# Patient Record
Sex: MALE | Race: WHITE | NOT HISPANIC OR LATINO | Employment: OTHER | URBAN - METROPOLITAN AREA
[De-identification: names, ages, dates, MRNs, and addresses within clinical notes are randomized per-mention and may not be internally consistent; named-entity substitution may affect disease eponyms.]

---

## 2018-01-10 NOTE — PROGRESS NOTES
Assessment   1  Encounter for preventive health examination (V70 0) (Z00 00)  2  History of Never a smoker  3  Never smoked tobacco (V49 89) (Z78 9)  4  Need for prophylactic vaccination against single diseases (V05 9) (Z23)    Plan  Health Maintenance    · Always use a seat belt and shoulder strap when riding or driving a motor vehicle ;  Status:Complete;   Done: 92LFH0299   · Brush your teeth freq1 and floss at least once a day ; Status:Complete;   Done:  97EJV5439   · Use a sun block product with an SPF of 15 or more ; Status:Complete;   Done:  02YJT3514   · We recommend routine visits to a dentist ; Status:Complete;   Done: 55WEZ6651   · Call [phone] if: You have any warning signs of skin cancer ; Status:Complete;    Done: 05ZSY3175   · Call 911 if: You experience a new kind of chest pain (angina) or pressure ;  Status:Complete;   Done: 61JDS1349  Need for prophylactic vaccination against single diseases    · Temporarily Stop: Fluzone Quadrivalent 0 5 ML Intramuscular Suspension   · Temporarily Stop: Pneumo (Pneumovax)   · Temporarily Stop: Zostavax 45863 UNT/0 65ML Subcutaneous Solution Reconstituted  Need for prophylactic vaccination and inoculation against influenza    · Temporarily Stop: Fluzone Quadrivalent 0 5 ML Intramuscular Suspension  Screening for colon cancer    · COLONOSCOPY ; every 10 years; Permanently Deferred; Status:Permanent Deferral    Discussion/Summary  Impression: health maintenance visit  Currently, he eats a healthy diet, eats a poor diet and has an inadequate exercise regimen  Prostate cancer screening: the patient declines PSA testing  Colorectal cancer screening: the patient declines colorectal cancer screening  The patient declines immunizations  Advice and education were given regarding sunscreen use and seat belt use  Patient discussion: discussed with the patient        Chief Complaint  The patient presents today for annual wellness exam      History of Present Illness  HM, Adult Male: The patient is being seen for a health maintenance evaluation  The last health maintenance visit was 1 year(s) ago  Social History: Household members include spouse and 1 daughter(s)  He is   The patient has never smoked cigarettes  He reports never drinking alcohol  He has never used illicit drugs  General Health: The patient's health since the last visit is described as good  He does not have regular dental visits  He denies vision problems  He has hearing loss  Hearing is significantly decreased   He wears a hearing aid  DO NOT USE  Immunizations status: not up to date  Lifestyle:  He does not have a healthy diet  He does not have any weight concerns  He does not exercise regularly  He does not use tobacco  He denies alcohol use  He denies drug use  Reproductive health:  the patient is not sexually active  Screening: Prostate cancer screening includes no previous evaluation  Colorectal cancer screening includes no previous screening  Safety elements used: seat belt, safe driving habits and smoke detector, but no sunscreen and no carbon monoxide detector  HPI: Cynthia is a 79-year-old gentleman who presents for a wellness visit accompanied by his wife  When asked, he states that his appetite is never-ending  He reports having hearing aids but they are unhelpful even though they have been adjusted  He is not interested in vaccines  When asked, he does have occasional palpitations and states that his cardiologist is aware of them  BK       Review of Systems    Cardiovascular: palpitations, but as noted in HPI, the heart rate was not slow, no chest pain, no intermittent leg claudication, the heart rate was not fast and no extremity edema  Respiratory: no shortness of breath, no cough, no orthopnea, no wheezing, no shortness of breath during exertion and no PND     Gastrointestinal: No complaints of abdominal pain, no constipation, no nausea or vomiting, no diarrhea or bloody stools  Genitourinary: No complaints of dysuria, no incontinence, no hesitancy, no nocturia, no genital lesion, no testicular pain  ROS reviewed  Active Problems   1  Acute upper respiratory infection (465 9) (J06 9)  2  ASCVD (arteriosclerotic cardiovascular disease) (429 2,440 9) (I25 10)  3  Cough (786 2) (R05)  4  Degeneration of lumbar intervertebral disc (722 52) (M51 36)  5  Exposed To Contagious Viral Disease (Influenza) (V01 79)  6  Hand deformity (736 00) (M21 949)  7  Herniated lumbar intervertebral disc (722 10) (M51 26)  8  Lower back pain (724 2) (M54 5)  9  Lumbar radiculopathy (724 4) (M54 16)  10  Lumbosacral spinal stenosis (724 02) (M48 07)  11  Screening for colon cancer (V76 51) (Z12 11)    Past Medical History    · History of Benign essential hypertension (401 1) (I10)   · History of Chest pain (786 50) (R07 9)   · History of angina pectoris (V12 59) (Z86 79)   · History of common cold (V12 09) (Z86 19)   · History of Leukemia (V10 60)   · History of Muscle Aches   · History of Nephrolithiasis (V13 01)    Surgical History    · History of Appendectomy   · History of Cath Stent Placement   · History of Hand Surgery   · History of Inguinal Hernia Repair   · History of Umbilical Hernia Repair    Family History  Mother    · Family history of Diabetes  Father    · Family history of Kidney disease  Family History    · Family history of Diabetes   · Family history of Hypertension, benign   · Family history of Kidney disease    Social History    · Denied: Exercising Regularly   · Never smoked tobacco (V49 89) (Z78 9)   · No alcohol use   · Previous  service, honorably discharged1      1 Amended By: Mac Gonzalez; Jun 26 2016 9:59 AM EST    Current Meds  1  Aspirin 325 MG Oral Tablet; Take 1 tablet daily Recorded  2  Lisinopril 5 MG Oral Tablet; TAKE 1 TABLET DAILY; Therapy: (Recorded:27Jan2013) to Recorded  3   Metoprolol Succinate 100 MG TBCR; Takes 1/2 tablet twice daily; Therapy: (OLQQGFYW:19ZQV9534) to Recorded  4  Plavix 75 MG Oral Tablet; TAKE 1 TABLET DAILY; Therapy: (ALTJTMEJ:12LEV2375) to Recorded  5  Vytorin 10-40 MG Oral Tablet; Take 1 tablet daily; Therapy: (Recorded:27Jan2013) to Recorded    Allergies   1  Dilantin CAPS    Vitals   Recorded: 50IZD5098 04:23PM   Temperature 98 5 F, Oral   Heart Rate 65   Respiration 16   Systolic 954, LUE, Sitting   Diastolic 84, LUE, Sitting   Height 5 ft 10 5 in   Weight 222 lb 0 8 oz   BMI Calculated 31 41   BSA Calculated 2 19     Physical Exam    Constitutional   General appearance: No acute distress, well appearing and well nourished  well developed, appears healthy, comfortable, well nourished, clothing appropriate, rested, not exhausted, well hydrated and appearance reflects stated age  Eyes   Conjunctiva and lids: No erythema, swelling or discharge  Ears, Nose, Mouth, and Throat   Hearing: Abnormal     Pulmonary   Respiratory effort: No increased work of breathing or signs of respiratory distress  Respiratory rate: normal  Assessment of respiratory effort revealed normal rhythm and effort  Auscultation of lungs: Clear to auscultation  no rales or crackles were heard bilaterally  no rhonchi  no friction rub  no wheezing  no diminished breath sounds  no bronchial breath sounds  Cardiovascular   Auscultation of heart: Normal rate and rhythm, normal S1 and S2, no murmurs  The heart rate was normal  The rhythm was regular  no murmurs were heard     Psychiatric   Judgment and insight: Normal     Mood and affect: Normal        Results/Data  Falls Risk Assessment (Dx V80 09 Screen for Neurologic Disorder) [id number] 04:27PM User, [employer]     Test Name Result Flag Reference   Falls Risk      No falls in the past year     PHQ-2 Adult Depression Screening 23Jun2016 04:26PM User, [employer]     Test Name Result Flag Reference   PHQ-2 Adult Depression Score 0     Over the last two weeks, how often have you been bothered by any of the following problems? Little interest or pleasure in doing things: Not at all - 0  Feeling down, depressed, or hopeless: Not at all - 0   PHQ-2 Adult Depression Screening Negative         Provider Comments  #1  Health maintenance-a history and a physical exam were performed for him during his office visit  His allergy history, medication history, and social history were reviewed with him and updated, as needed  He will continue with annual wellness visits  #2  Vaccine care-he gives informed refusal to receive the seasonal influenza vaccine, Pneumovax 23 vaccine with his ASCVD history, and shingles vaccine  #3  Colon cancer screening-he gives informed refusal for preventative care  #4  Prostate cancer screening-he gives informed refusal for preventative care  #5  ASCVD status post stent placement-he is asymptomatic and following with his cardiologist for secondary preventative care  #6  Kidney stone history-he is following with his urologist as needed  #7  Hearing difficulty-he reports that his hearing aids were unhelpful  #8  Left knee pain-he reports that a total knee replacement has been recommended, but he is waiting until he is covered by Medicare by report  #9  Lumbar canal stenosis status post surgical treatment-he reports that he is doing well  #10  I advised him to follow-up in one year for a wellness visit which will be his welcome to Medicare office visit or sooner, if needed  Health Management  Screening for colon cancer   COLONOSCOPY; every 10 years; Next Permanently Deferred; Future Appointments    Date/Time Provider Specialty Site   06/23/2017 09:00 AM Kvng Fernandez MD [facility]   Electronically signed by :  Александр Rodriguez MD; Jun 26 2016  9:59AM EST                       (Author)

## 2023-02-27 ENCOUNTER — ESTABLISHED COMPREHENSIVE EXAM (OUTPATIENT)
Dept: URBAN - METROPOLITAN AREA CLINIC 6 | Facility: CLINIC | Age: 73
End: 2023-02-27

## 2023-02-27 DIAGNOSIS — H47.20: ICD-10-CM

## 2023-02-27 DIAGNOSIS — H04.123: ICD-10-CM

## 2023-02-27 DIAGNOSIS — Z96.1: ICD-10-CM

## 2023-02-27 DIAGNOSIS — E11.9: ICD-10-CM

## 2023-02-27 PROCEDURE — 92014 COMPRE OPH EXAM EST PT 1/>: CPT

## 2023-02-27 ASSESSMENT — VISUAL ACUITY
OS_PH: 20/30
OD_SC: 20/50
OS_SC: 20/70
OD_PH: 20/30+2

## 2023-02-27 ASSESSMENT — TONOMETRY
OS_IOP_MMHG: 13
OD_IOP_MMHG: 13

## 2023-05-03 ENCOUNTER — APPOINTMENT (INPATIENT)
Dept: CT IMAGING | Facility: HOSPITAL | Age: 73
End: 2023-05-03

## 2023-05-03 ENCOUNTER — ANESTHESIA EVENT (INPATIENT)
Dept: RADIOLOGY | Facility: HOSPITAL | Age: 73
End: 2023-05-03

## 2023-05-03 ENCOUNTER — APPOINTMENT (EMERGENCY)
Dept: RADIOLOGY | Facility: HOSPITAL | Age: 73
End: 2023-05-03

## 2023-05-03 ENCOUNTER — HOSPITAL ENCOUNTER (INPATIENT)
Facility: HOSPITAL | Age: 73
LOS: 12 days | Discharge: HOME | End: 2023-05-15
Attending: EMERGENCY MEDICINE | Admitting: SURGERY

## 2023-05-03 ENCOUNTER — APPOINTMENT (INPATIENT)
Dept: RADIOLOGY | Facility: HOSPITAL | Age: 73
End: 2023-05-03

## 2023-05-03 ENCOUNTER — APPOINTMENT (INPATIENT)
Dept: RADIOLOGY | Facility: HOSPITAL | Age: 73
End: 2023-05-03
Attending: INTERNAL MEDICINE

## 2023-05-03 ENCOUNTER — ANESTHESIA (OUTPATIENT)
Dept: ANESTHESIOLOGY | Facility: HOSPITAL | Age: 73
End: 2023-05-03

## 2023-05-03 ENCOUNTER — APPOINTMENT (EMERGENCY)
Dept: CT IMAGING | Facility: HOSPITAL | Age: 73
End: 2023-05-03

## 2023-05-03 ENCOUNTER — APPOINTMENT (INPATIENT)
Dept: RADIOLOGY | Facility: HOSPITAL | Age: 73
End: 2023-05-03
Attending: RADIOLOGY

## 2023-05-03 ENCOUNTER — ANESTHESIA EVENT (OUTPATIENT)
Dept: ANESTHESIOLOGY | Facility: HOSPITAL | Age: 73
End: 2023-05-03

## 2023-05-03 ENCOUNTER — ANESTHESIA (INPATIENT)
Dept: RADIOLOGY | Facility: HOSPITAL | Age: 73
End: 2023-05-03

## 2023-05-03 ENCOUNTER — APPOINTMENT (INPATIENT)
Dept: NON INVASIVE DIAGNOSTICS | Facility: HOSPITAL | Age: 73
End: 2023-05-03

## 2023-05-03 DIAGNOSIS — N28.89 RENAL HEMORRHAGE, LEFT: Primary | ICD-10-CM

## 2023-05-03 DIAGNOSIS — R57.8 HEMORRHAGIC SHOCK (HCC): ICD-10-CM

## 2023-05-03 DIAGNOSIS — R06.89 ACUTE RESPIRATORY INSUFFICIENCY: ICD-10-CM

## 2023-05-03 DIAGNOSIS — E80.6 HYPERBILIRUBINEMIA: ICD-10-CM

## 2023-05-03 DIAGNOSIS — I25.10 CAD (CORONARY ARTERY DISEASE): ICD-10-CM

## 2023-05-03 DIAGNOSIS — S37.062A: ICD-10-CM

## 2023-05-03 DIAGNOSIS — G40.909 SEIZURE DISORDER (HCC): ICD-10-CM

## 2023-05-03 PROBLEM — E11.9 TYPE 2 DIABETES MELLITUS (HCC): Status: ACTIVE | Noted: 2023-05-03

## 2023-05-03 PROBLEM — I48.0 PAROXYSMAL ATRIAL FIBRILLATION (HCC): Status: ACTIVE | Noted: 2023-05-03

## 2023-05-03 PROBLEM — C95.90 LEUKEMIA (HCC): Status: ACTIVE | Noted: 2023-05-03

## 2023-05-03 PROBLEM — D73.5 SPLENIC HEMORRHAGE: Status: ACTIVE | Noted: 2023-05-03

## 2023-05-03 LAB
2HR DELTA HS TROPONIN: 566 NG/L
ABO GROUP BLD BPU: NORMAL
ABO GROUP BLD BPU: NORMAL
ABO GROUP BLD: NORMAL
ALBUMIN SERPL BCP-MCNC: 3.1 G/DL (ref 3.5–5)
ALBUMIN SERPL BCP-MCNC: 3.1 G/DL (ref 3.5–5)
ALBUMIN SERPL BCP-MCNC: 3.3 G/DL (ref 3.5–5)
ALBUMIN SERPL BCP-MCNC: 3.9 G/DL (ref 3.5–5)
ALP SERPL-CCNC: 32 U/L (ref 34–104)
ALP SERPL-CCNC: 32 U/L (ref 34–104)
ALP SERPL-CCNC: 39 U/L (ref 34–104)
ALP SERPL-CCNC: 53 U/L (ref 34–104)
ALT SERPL W P-5'-P-CCNC: 10 U/L (ref 7–52)
ALT SERPL W P-5'-P-CCNC: 14 U/L (ref 7–52)
ALT SERPL W P-5'-P-CCNC: 16 U/L (ref 7–52)
ALT SERPL W P-5'-P-CCNC: 9 U/L (ref 7–52)
ANION GAP SERPL CALCULATED.3IONS-SCNC: 10 MMOL/L (ref 4–13)
ANION GAP SERPL CALCULATED.3IONS-SCNC: 19 MMOL/L (ref 4–13)
ANION GAP SERPL CALCULATED.3IONS-SCNC: 8 MMOL/L (ref 4–13)
APTT PPP: 41 SECONDS (ref 23–37)
AST SERPL W P-5'-P-CCNC: 13 U/L (ref 13–39)
AST SERPL W P-5'-P-CCNC: 14 U/L (ref 13–39)
AST SERPL W P-5'-P-CCNC: 15 U/L (ref 13–39)
AST SERPL W P-5'-P-CCNC: 31 U/L (ref 13–39)
ATRIAL RATE: 134 BPM
ATRIAL RATE: 61 BPM
BASE EXCESS BLDA CALC-SCNC: -13 MMOL/L (ref -2–3)
BASE EXCESS BLDA CALC-SCNC: -7.5 MMOL/L
BASOPHILS # BLD AUTO: 0.05 THOUSANDS/ÂΜL (ref 0–0.1)
BASOPHILS # BLD AUTO: 0.07 THOUSANDS/ÂΜL (ref 0–0.1)
BASOPHILS # BLD AUTO: 0.09 THOUSANDS/ÂΜL (ref 0–0.1)
BASOPHILS # BLD AUTO: 0.09 THOUSANDS/ÂΜL (ref 0–0.1)
BASOPHILS # BLD MANUAL: 0 THOUSAND/UL (ref 0–0.1)
BASOPHILS NFR BLD AUTO: 0 % (ref 0–1)
BASOPHILS NFR BLD AUTO: 1 % (ref 0–1)
BASOPHILS NFR MAR MANUAL: 0 % (ref 0–1)
BILIRUB SERPL-MCNC: 0.5 MG/DL (ref 0.2–1)
BILIRUB SERPL-MCNC: 0.63 MG/DL (ref 0.2–1)
BILIRUB SERPL-MCNC: 0.74 MG/DL (ref 0.2–1)
BILIRUB SERPL-MCNC: 0.84 MG/DL (ref 0.2–1)
BLD GP AB SCN SERPL QL: NEGATIVE
BPU ID: NORMAL
BPU ID: NORMAL
BUN SERPL-MCNC: 16 MG/DL (ref 5–25)
BUN SERPL-MCNC: 18 MG/DL (ref 5–25)
BUN SERPL-MCNC: 19 MG/DL (ref 5–25)
BUN SERPL-MCNC: 26 MG/DL (ref 5–25)
BUN SERPL-MCNC: 26 MG/DL (ref 5–25)
CA-I BLD-SCNC: 1.06 MMOL/L (ref 1.12–1.32)
CA-I BLD-SCNC: 1.12 MMOL/L (ref 1.12–1.32)
CALCIUM ALBUM COR SERPL-MCNC: 7.9 MG/DL (ref 8.3–10.1)
CALCIUM ALBUM COR SERPL-MCNC: 7.9 MG/DL (ref 8.3–10.1)
CALCIUM ALBUM COR SERPL-MCNC: 8 MG/DL (ref 8.3–10.1)
CALCIUM SERPL-MCNC: 7.2 MG/DL (ref 8.4–10.2)
CALCIUM SERPL-MCNC: 7.2 MG/DL (ref 8.4–10.2)
CALCIUM SERPL-MCNC: 7.4 MG/DL (ref 8.4–10.2)
CALCIUM SERPL-MCNC: 8.2 MG/DL (ref 8.4–10.2)
CALCIUM SERPL-MCNC: 8.5 MG/DL (ref 8.4–10.2)
CARDIAC TROPONIN I PNL SERPL HS: 1150 NG/L
CARDIAC TROPONIN I PNL SERPL HS: 187 NG/L
CARDIAC TROPONIN I PNL SERPL HS: 753 NG/L
CFFMA (FUNCTIONAL FIBRINOGEN MAX AMPLITUDE): 12.7 MM (ref 15–32)
CFFMA (FUNCTIONAL FIBRINOGEN MAX AMPLITUDE): 16.6 MM (ref 15–32)
CHLORIDE SERPL-SCNC: 103 MMOL/L (ref 96–108)
CHLORIDE SERPL-SCNC: 106 MMOL/L (ref 96–108)
CHLORIDE SERPL-SCNC: 109 MMOL/L (ref 96–108)
CKLY30: 0 % (ref 0–2.6)
CKLY30: 0 % (ref 0–2.6)
CKR(REACTION TIME): 8.4 MIN (ref 4.6–9.1)
CKR(REACTION TIME): 8.5 MIN (ref 4.6–9.1)
CO2 SERPL-SCNC: 14 MMOL/L (ref 21–32)
CO2 SERPL-SCNC: 18 MMOL/L (ref 21–32)
CO2 SERPL-SCNC: 21 MMOL/L (ref 21–32)
CO2 SERPL-SCNC: 24 MMOL/L (ref 21–32)
CO2 SERPL-SCNC: 28 MMOL/L (ref 21–32)
CREAT SERPL-MCNC: 1.18 MG/DL (ref 0.6–1.3)
CREAT SERPL-MCNC: 1.31 MG/DL (ref 0.6–1.3)
CREAT SERPL-MCNC: 1.41 MG/DL (ref 0.6–1.3)
CREAT SERPL-MCNC: 2.28 MG/DL (ref 0.6–1.3)
CREAT SERPL-MCNC: 2.41 MG/DL (ref 0.6–1.3)
CROSSMATCH: NORMAL
CROSSMATCH: NORMAL
CRTMA(RAPIDTEG MAX AMPLITUDE): 56 MM (ref 52–70)
CRTMA(RAPIDTEG MAX AMPLITUDE): 58 MM (ref 52–70)
DS:DELIVERY SYSTEM: ABNORMAL
EOSINOPHIL # BLD AUTO: 0.03 THOUSAND/ÂΜL (ref 0–0.61)
EOSINOPHIL # BLD AUTO: 0.05 THOUSAND/ÂΜL (ref 0–0.61)
EOSINOPHIL # BLD AUTO: 0.36 THOUSAND/ÂΜL (ref 0–0.61)
EOSINOPHIL # BLD AUTO: 0.62 THOUSAND/ÂΜL (ref 0–0.61)
EOSINOPHIL # BLD MANUAL: 0 THOUSAND/UL (ref 0–0.4)
EOSINOPHIL NFR BLD AUTO: 0 % (ref 0–6)
EOSINOPHIL NFR BLD AUTO: 0 % (ref 0–6)
EOSINOPHIL NFR BLD AUTO: 2 % (ref 0–6)
EOSINOPHIL NFR BLD AUTO: 3 % (ref 0–6)
EOSINOPHIL NFR BLD MANUAL: 0 % (ref 0–6)
ERYTHROCYTE [DISTWIDTH] IN BLOOD BY AUTOMATED COUNT: 11.9 % (ref 11.6–15.1)
ERYTHROCYTE [DISTWIDTH] IN BLOOD BY AUTOMATED COUNT: 13 % (ref 11.6–15.1)
ERYTHROCYTE [DISTWIDTH] IN BLOOD BY AUTOMATED COUNT: 13.2 % (ref 11.6–15.1)
ERYTHROCYTE [DISTWIDTH] IN BLOOD BY AUTOMATED COUNT: 13.3 % (ref 11.6–15.1)
ERYTHROCYTE [DISTWIDTH] IN BLOOD BY AUTOMATED COUNT: 14.7 % (ref 11.6–15.1)
GFR SERPL CREATININE-BSD FRML MDRD: 26 ML/MIN/1.73SQ M
GFR SERPL CREATININE-BSD FRML MDRD: 27 ML/MIN/1.73SQ M
GFR SERPL CREATININE-BSD FRML MDRD: 49 ML/MIN/1.73SQ M
GFR SERPL CREATININE-BSD FRML MDRD: 54 ML/MIN/1.73SQ M
GFR SERPL CREATININE-BSD FRML MDRD: 61 ML/MIN/1.73SQ M
GLUCOSE SERPL-MCNC: 174 MG/DL (ref 65–140)
GLUCOSE SERPL-MCNC: 196 MG/DL (ref 65–140)
GLUCOSE SERPL-MCNC: 200 MG/DL (ref 65–140)
GLUCOSE SERPL-MCNC: 256 MG/DL (ref 65–140)
GLUCOSE SERPL-MCNC: 265 MG/DL (ref 65–140)
GLUCOSE SERPL-MCNC: 276 MG/DL (ref 65–140)
GLUCOSE SERPL-MCNC: 279 MG/DL (ref 65–140)
GLUCOSE SERPL-MCNC: 283 MG/DL (ref 65–140)
GLUCOSE SERPL-MCNC: 285 MG/DL (ref 65–140)
GLUCOSE SERPL-MCNC: 285 MG/DL (ref 65–140)
GLUCOSE SERPL-MCNC: 298 MG/DL (ref 65–140)
HCO3 BLDA-SCNC: 10.7 MMOL/L (ref 22–28)
HCO3 BLDA-SCNC: 18.4 MMOL/L (ref 22–28)
HCT VFR BLD AUTO: 30.4 % (ref 36.5–49.3)
HCT VFR BLD AUTO: 31.6 % (ref 36.5–49.3)
HCT VFR BLD AUTO: 32.1 % (ref 36.5–49.3)
HCT VFR BLD AUTO: 32.1 % (ref 36.5–49.3)
HCT VFR BLD AUTO: 32.5 % (ref 36.5–49.3)
HCT VFR BLD AUTO: 44.5 % (ref 36.5–49.3)
HCT VFR BLD CALC: 27 % (ref 36.5–49.3)
HGB BLD-MCNC: 10.3 G/DL (ref 12–17)
HGB BLD-MCNC: 10.5 G/DL (ref 12–17)
HGB BLD-MCNC: 10.8 G/DL (ref 12–17)
HGB BLD-MCNC: 10.9 G/DL (ref 12–17)
HGB BLD-MCNC: 11 G/DL (ref 12–17)
HGB BLD-MCNC: 12.1 G/DL (ref 12–17)
HGB BLD-MCNC: 15.2 G/DL (ref 12–17)
HGB BLDA-MCNC: 9.2 G/DL (ref 12–17)
IMM GRANULOCYTES # BLD AUTO: 0.06 THOUSAND/UL (ref 0–0.2)
IMM GRANULOCYTES # BLD AUTO: 0.26 THOUSAND/UL (ref 0–0.2)
IMM GRANULOCYTES # BLD AUTO: 0.44 THOUSAND/UL (ref 0–0.2)
IMM GRANULOCYTES # BLD AUTO: >0.5 THOUSAND/UL (ref 0–0.2)
IMM GRANULOCYTES NFR BLD AUTO: 0 % (ref 0–2)
IMM GRANULOCYTES NFR BLD AUTO: 1 % (ref 0–2)
IMM GRANULOCYTES NFR BLD AUTO: 2 % (ref 0–2)
IMM GRANULOCYTES NFR BLD AUTO: 2 % (ref 0–2)
INR PPP: 1.81 (ref 0.84–1.19)
INR PPP: 2.12 (ref 0.84–1.19)
INR PPP: 2.99 (ref 0.84–1.19)
INR PPP: 4.33 (ref 0.84–1.19)
LACTATE SERPL-SCNC: 10.1 MMOL/L (ref 0.5–2)
LACTATE SERPL-SCNC: 3.1 MMOL/L (ref 0.5–2)
LACTATE SERPL-SCNC: 4 MMOL/L (ref 0.5–2)
LIPASE SERPL-CCNC: 23 U/L (ref 11–82)
LYMPHOCYTES # BLD AUTO: 0 % (ref 14–44)
LYMPHOCYTES # BLD AUTO: 0 THOUSAND/UL (ref 0.6–4.47)
LYMPHOCYTES # BLD AUTO: 0.66 THOUSANDS/ÂΜL (ref 0.6–4.47)
LYMPHOCYTES # BLD AUTO: 0.97 THOUSANDS/ÂΜL (ref 0.6–4.47)
LYMPHOCYTES # BLD AUTO: 1.09 THOUSANDS/ÂΜL (ref 0.6–4.47)
LYMPHOCYTES # BLD AUTO: 2.75 THOUSANDS/ÂΜL (ref 0.6–4.47)
LYMPHOCYTES NFR BLD AUTO: 18 % (ref 14–44)
LYMPHOCYTES NFR BLD AUTO: 3 % (ref 14–44)
MAGNESIUM SERPL-MCNC: 1.7 MG/DL (ref 1.9–2.7)
MAGNESIUM SERPL-MCNC: 1.8 MG/DL (ref 1.9–2.7)
MAGNESIUM SERPL-MCNC: 1.9 MG/DL (ref 1.9–2.7)
MCH RBC QN AUTO: 31.2 PG (ref 26.8–34.3)
MCH RBC QN AUTO: 32.4 PG (ref 26.8–34.3)
MCH RBC QN AUTO: 32.9 PG (ref 26.8–34.3)
MCH RBC QN AUTO: 33.1 PG (ref 26.8–34.3)
MCH RBC QN AUTO: 33.4 PG (ref 26.8–34.3)
MCHC RBC AUTO-ENTMCNC: 33.6 G/DL (ref 31.4–37.4)
MCHC RBC AUTO-ENTMCNC: 33.8 G/DL (ref 31.4–37.4)
MCHC RBC AUTO-ENTMCNC: 33.9 G/DL (ref 31.4–37.4)
MCHC RBC AUTO-ENTMCNC: 34 G/DL (ref 31.4–37.4)
MCHC RBC AUTO-ENTMCNC: 34.2 G/DL (ref 31.4–37.4)
MCV RBC AUTO: 92 FL (ref 82–98)
MCV RBC AUTO: 96 FL (ref 82–98)
MCV RBC AUTO: 98 FL (ref 82–98)
MONOCYTES # BLD AUTO: 0.78 THOUSAND/ÂΜL (ref 0.17–1.22)
MONOCYTES # BLD AUTO: 1.7 THOUSAND/ÂΜL (ref 0.17–1.22)
MONOCYTES # BLD AUTO: 1.89 THOUSAND/ÂΜL (ref 0.17–1.22)
MONOCYTES # BLD AUTO: 2.06 THOUSAND/UL (ref 0–1.22)
MONOCYTES # BLD AUTO: 2.13 THOUSAND/ÂΜL (ref 0.17–1.22)
MONOCYTES NFR BLD AUTO: 5 % (ref 4–12)
MONOCYTES NFR BLD AUTO: 6 % (ref 4–12)
MONOCYTES NFR BLD AUTO: 6 % (ref 4–12)
MONOCYTES NFR BLD AUTO: 8 % (ref 4–12)
MONOCYTES NFR BLD: 6 % (ref 4–12)
NEUTROPHILS # BLD AUTO: 11.65 THOUSANDS/ÂΜL (ref 1.85–7.62)
NEUTROPHILS # BLD AUTO: 19.15 THOUSANDS/ÂΜL (ref 1.85–7.62)
NEUTROPHILS # BLD AUTO: 26.12 THOUSANDS/ÂΜL (ref 1.85–7.62)
NEUTROPHILS # BLD AUTO: 29.82 THOUSANDS/ÂΜL (ref 1.85–7.62)
NEUTROPHILS # BLD MANUAL: 32.26 THOUSAND/UL (ref 1.85–7.62)
NEUTS BAND NFR BLD MANUAL: 2 % (ref 0–8)
NEUTS SEG NFR BLD AUTO: 74 % (ref 43–75)
NEUTS SEG NFR BLD AUTO: 85 % (ref 43–75)
NEUTS SEG NFR BLD AUTO: 89 % (ref 43–75)
NEUTS SEG NFR BLD AUTO: 89 % (ref 43–75)
NEUTS SEG NFR BLD AUTO: 92 % (ref 43–75)
NRBC BLD AUTO-RTO: 0 /100 WBCS
O2 CT BLDA-SCNC: 16.4 ML/DL (ref 16–23)
OXYHGB MFR BLDA: 96.4 % (ref 94–97)
P AXIS: 43 DEGREES
P AXIS: 53 DEGREES
PCO2 BLD: 11 MMOL/L (ref 21–32)
PCO2 BLD: 19.3 MM HG (ref 36–44)
PCO2 BLDA: 39 MM HG (ref 36–44)
PH BLD: 7.35 [PH] (ref 7.35–7.45)
PH BLDA: 7.29 [PH] (ref 7.35–7.45)
PHOSPHATE SERPL-MCNC: 4.2 MG/DL (ref 2.3–4.1)
PHOSPHATE SERPL-MCNC: 4.5 MG/DL (ref 2.3–4.1)
PHOSPHATE SERPL-MCNC: 5.3 MG/DL (ref 2.3–4.1)
PLATELET # BLD AUTO: 130 THOUSANDS/UL (ref 149–390)
PLATELET # BLD AUTO: 205 THOUSANDS/UL (ref 149–390)
PLATELET # BLD AUTO: 224 THOUSANDS/UL (ref 149–390)
PLATELET # BLD AUTO: 246 THOUSANDS/UL (ref 149–390)
PLATELET # BLD AUTO: 281 THOUSANDS/UL (ref 149–390)
PLATELET BLD QL SMEAR: ADEQUATE
PMV BLD AUTO: 10.2 FL (ref 8.9–12.7)
PMV BLD AUTO: 10.2 FL (ref 8.9–12.7)
PMV BLD AUTO: 10.6 FL (ref 8.9–12.7)
PMV BLD AUTO: 10.6 FL (ref 8.9–12.7)
PMV BLD AUTO: 10.8 FL (ref 8.9–12.7)
PO2 BLD: 116 MM HG (ref 75–129)
PO2 BLDA: 92.6 MM HG (ref 75–129)
POTASSIUM BLD-SCNC: 5.3 MMOL/L (ref 3.5–5.3)
POTASSIUM SERPL-SCNC: 3.1 MMOL/L (ref 3.5–5.3)
POTASSIUM SERPL-SCNC: 3.8 MMOL/L (ref 3.5–5.3)
POTASSIUM SERPL-SCNC: 4 MMOL/L (ref 3.5–5.3)
POTASSIUM SERPL-SCNC: 5.2 MMOL/L (ref 3.5–5.3)
POTASSIUM SERPL-SCNC: 5.3 MMOL/L (ref 3.5–5.3)
PR INTERVAL: 122 MS
PR INTERVAL: 164 MS
PROT SERPL-MCNC: 4.5 G/DL (ref 6.4–8.4)
PROT SERPL-MCNC: 4.5 G/DL (ref 6.4–8.4)
PROT SERPL-MCNC: 4.9 G/DL (ref 6.4–8.4)
PROT SERPL-MCNC: 6.1 G/DL (ref 6.4–8.4)
PROTHROMBIN TIME: 21.3 SECONDS (ref 11.6–14.5)
PROTHROMBIN TIME: 24 SECONDS (ref 11.6–14.5)
PROTHROMBIN TIME: 31.3 SECONDS (ref 11.6–14.5)
PROTHROMBIN TIME: 41.7 SECONDS (ref 11.6–14.5)
QRS AXIS: 79 DEGREES
QRS AXIS: 87 DEGREES
QRSD INTERVAL: 108 MS
QRSD INTERVAL: 98 MS
QT INTERVAL: 302 MS
QT INTERVAL: 468 MS
QTC INTERVAL: 450 MS
QTC INTERVAL: 471 MS
RBC # BLD AUTO: 3.18 MILLION/UL (ref 3.88–5.62)
RBC # BLD AUTO: 3.28 MILLION/UL (ref 3.88–5.62)
RBC # BLD AUTO: 3.29 MILLION/UL (ref 3.88–5.62)
RBC # BLD AUTO: 3.53 MILLION/UL (ref 3.88–5.62)
RBC # BLD AUTO: 4.55 MILLION/UL (ref 3.88–5.62)
RBC MORPH BLD: NORMAL
RESPIRATORY RATE: 6
RH BLD: POSITIVE
SAO2 % BLD FROM PO2: 98 % (ref 60–85)
SODIUM BLD-SCNC: 137 MMOL/L (ref 136–145)
SODIUM SERPL-SCNC: 137 MMOL/L (ref 135–147)
SODIUM SERPL-SCNC: 139 MMOL/L (ref 135–147)
SODIUM SERPL-SCNC: 140 MMOL/L (ref 135–147)
SODIUM SERPL-SCNC: 141 MMOL/L (ref 135–147)
SODIUM SERPL-SCNC: 141 MMOL/L (ref 135–147)
SPECIMEN EXPIRATION DATE: NORMAL
SPECIMEN SOURCE: ABNORMAL
SPECIMEN SOURCE: ABNORMAL
T WAVE AXIS: -24 DEGREES
T WAVE AXIS: 81 DEGREES
UNIT DISPENSE STATUS: NORMAL
UNIT DISPENSE STATUS: NORMAL
UNIT PRODUCT CODE: NORMAL
UNIT PRODUCT CODE: NORMAL
UNIT PRODUCT VOLUME: 350 ML
UNIT PRODUCT VOLUME: 350 ML
UNIT RH: NORMAL
UNIT RH: NORMAL
VENTRICULAR RATE: 134 BPM
VENTRICULAR RATE: 61 BPM
WBC # BLD AUTO: 15.69 THOUSAND/UL (ref 4.31–10.16)
WBC # BLD AUTO: 22.63 THOUSAND/UL (ref 4.31–10.16)
WBC # BLD AUTO: 29.35 THOUSAND/UL (ref 4.31–10.16)
WBC # BLD AUTO: 34.02 THOUSAND/UL (ref 4.31–10.16)
WBC # BLD AUTO: 34.32 THOUSAND/UL (ref 4.31–10.16)

## 2023-05-03 PROCEDURE — 04LA3DZ OCCLUSION OF LEFT RENAL ARTERY WITH INTRALUMINAL DEVICE, PERCUTANEOUS APPROACH: ICD-10-PCS | Performed by: INTERNAL MEDICINE

## 2023-05-03 PROCEDURE — B4171ZZ FLUOROSCOPY OF LEFT RENAL ARTERY USING LOW OSMOLAR CONTRAST: ICD-10-PCS | Performed by: RADIOLOGY

## 2023-05-03 PROCEDURE — B4171ZZ FLUOROSCOPY OF LEFT RENAL ARTERY USING LOW OSMOLAR CONTRAST: ICD-10-PCS | Performed by: INTERNAL MEDICINE

## 2023-05-03 PROCEDURE — 04LA3DZ OCCLUSION OF LEFT RENAL ARTERY WITH INTRALUMINAL DEVICE, PERCUTANEOUS APPROACH: ICD-10-PCS | Performed by: RADIOLOGY

## 2023-05-03 PROCEDURE — B4191ZZ FLUOROSCOPY OF LUMBAR ARTERIES USING LOW OSMOLAR CONTRAST: ICD-10-PCS | Performed by: INTERNAL MEDICINE

## 2023-05-03 PROCEDURE — 30233K1 TRANSFUSION OF NONAUTOLOGOUS FROZEN PLASMA INTO PERIPHERAL VEIN, PERCUTANEOUS APPROACH: ICD-10-PCS | Performed by: SURGERY

## 2023-05-03 PROCEDURE — 5A1935Z RESPIRATORY VENTILATION, LESS THAN 24 CONSECUTIVE HOURS: ICD-10-PCS | Performed by: INTERNAL MEDICINE

## 2023-05-03 PROCEDURE — B41F1ZZ FLUOROSCOPY OF RIGHT LOWER EXTREMITY ARTERIES USING LOW OSMOLAR CONTRAST: ICD-10-PCS | Performed by: RADIOLOGY

## 2023-05-03 PROCEDURE — 30233N1 TRANSFUSION OF NONAUTOLOGOUS RED BLOOD CELLS INTO PERIPHERAL VEIN, PERCUTANEOUS APPROACH: ICD-10-PCS | Performed by: SURGERY

## 2023-05-03 RX ORDER — ACETAMINOPHEN 325 MG/1
975 TABLET ORAL EVERY 8 HOURS SCHEDULED
Status: DISCONTINUED | OUTPATIENT
Start: 2023-05-03 | End: 2023-05-08

## 2023-05-03 RX ORDER — HYDROMORPHONE HCL/PF 1 MG/ML
1 SYRINGE (ML) INJECTION ONCE
Status: DISCONTINUED | OUTPATIENT
Start: 2023-05-03 | End: 2023-05-03

## 2023-05-03 RX ORDER — ONDANSETRON 2 MG/ML
4 INJECTION INTRAMUSCULAR; INTRAVENOUS EVERY 6 HOURS PRN
Status: DISCONTINUED | OUTPATIENT
Start: 2023-05-03 | End: 2023-05-15 | Stop reason: HOSPADM

## 2023-05-03 RX ORDER — LACOSAMIDE 100 MG/1
100 TABLET ORAL EVERY 12 HOURS SCHEDULED
COMMUNITY

## 2023-05-03 RX ORDER — CEFAZOLIN SODIUM 1 G/3ML
INJECTION, POWDER, FOR SOLUTION INTRAMUSCULAR; INTRAVENOUS AS NEEDED
Status: DISCONTINUED | OUTPATIENT
Start: 2023-05-03 | End: 2023-05-03

## 2023-05-03 RX ORDER — LIDOCAINE HYDROCHLORIDE 10 MG/ML
INJECTION, SOLUTION EPIDURAL; INFILTRATION; INTRACAUDAL; PERINEURAL AS NEEDED
Status: COMPLETED | OUTPATIENT
Start: 2023-05-03 | End: 2023-05-03

## 2023-05-03 RX ORDER — ONDANSETRON 2 MG/ML
4 INJECTION INTRAMUSCULAR; INTRAVENOUS ONCE AS NEEDED
Status: COMPLETED | OUTPATIENT
Start: 2023-05-03 | End: 2023-05-03

## 2023-05-03 RX ORDER — INSULIN LISPRO 100 [IU]/ML
1-6 INJECTION, SOLUTION INTRAVENOUS; SUBCUTANEOUS
Status: DISCONTINUED | OUTPATIENT
Start: 2023-05-03 | End: 2023-05-03

## 2023-05-03 RX ORDER — INSULIN LISPRO 100 [IU]/ML
1-6 INJECTION, SOLUTION INTRAVENOUS; SUBCUTANEOUS EVERY 6 HOURS SCHEDULED
Status: DISCONTINUED | OUTPATIENT
Start: 2023-05-03 | End: 2023-05-10

## 2023-05-03 RX ORDER — EZETIMIBE AND SIMVASTATIN 10; 40 MG/1; MG/1
1 TABLET ORAL
COMMUNITY

## 2023-05-03 RX ORDER — SODIUM CHLORIDE, SODIUM GLUCONATE, SODIUM ACETATE, POTASSIUM CHLORIDE, MAGNESIUM CHLORIDE, SODIUM PHOSPHATE, DIBASIC, AND POTASSIUM PHOSPHATE .53; .5; .37; .037; .03; .012; .00082 G/100ML; G/100ML; G/100ML; G/100ML; G/100ML; G/100ML; G/100ML
100 INJECTION, SOLUTION INTRAVENOUS CONTINUOUS
Status: DISCONTINUED | OUTPATIENT
Start: 2023-05-03 | End: 2023-05-04

## 2023-05-03 RX ORDER — SODIUM CHLORIDE, SODIUM GLUCONATE, SODIUM ACETATE, POTASSIUM CHLORIDE, MAGNESIUM CHLORIDE, SODIUM PHOSPHATE, DIBASIC, AND POTASSIUM PHOSPHATE .53; .5; .37; .037; .03; .012; .00082 G/100ML; G/100ML; G/100ML; G/100ML; G/100ML; G/100ML; G/100ML
125 INJECTION, SOLUTION INTRAVENOUS CONTINUOUS
Status: DISCONTINUED | OUTPATIENT
Start: 2023-05-03 | End: 2023-05-03

## 2023-05-03 RX ORDER — METHOCARBAMOL 500 MG/1
500 TABLET, FILM COATED ORAL EVERY 8 HOURS SCHEDULED
Status: DISCONTINUED | OUTPATIENT
Start: 2023-05-03 | End: 2023-05-15 | Stop reason: HOSPADM

## 2023-05-03 RX ORDER — ONDANSETRON 2 MG/ML
4 INJECTION INTRAMUSCULAR; INTRAVENOUS ONCE
Status: COMPLETED | OUTPATIENT
Start: 2023-05-03 | End: 2023-05-03

## 2023-05-03 RX ORDER — LACOSAMIDE 50 MG/1
100 TABLET ORAL EVERY 12 HOURS SCHEDULED
Status: DISCONTINUED | OUTPATIENT
Start: 2023-05-03 | End: 2023-05-15 | Stop reason: HOSPADM

## 2023-05-03 RX ORDER — INSULIN LISPRO 100 [IU]/ML
1-5 INJECTION, SOLUTION INTRAVENOUS; SUBCUTANEOUS
Status: DISCONTINUED | OUTPATIENT
Start: 2023-05-03 | End: 2023-05-03

## 2023-05-03 RX ORDER — ALBUMIN, HUMAN INJ 5% 5 %
SOLUTION INTRAVENOUS CONTINUOUS PRN
Status: DISCONTINUED | OUTPATIENT
Start: 2023-05-03 | End: 2023-05-03

## 2023-05-03 RX ORDER — SODIUM CHLORIDE 9 MG/ML
INJECTION, SOLUTION INTRAVENOUS CONTINUOUS PRN
Status: DISCONTINUED | OUTPATIENT
Start: 2023-05-03 | End: 2023-05-03

## 2023-05-03 RX ORDER — OXYCODONE HYDROCHLORIDE 5 MG/1
5 TABLET ORAL EVERY 4 HOURS PRN
Status: DISCONTINUED | OUTPATIENT
Start: 2023-05-03 | End: 2023-05-03

## 2023-05-03 RX ORDER — SODIUM CHLORIDE, SODIUM GLUCONATE, SODIUM ACETATE, POTASSIUM CHLORIDE, MAGNESIUM CHLORIDE, SODIUM PHOSPHATE, DIBASIC, AND POTASSIUM PHOSPHATE .53; .5; .37; .037; .03; .012; .00082 G/100ML; G/100ML; G/100ML; G/100ML; G/100ML; G/100ML; G/100ML
1000 INJECTION, SOLUTION INTRAVENOUS ONCE
Status: COMPLETED | OUTPATIENT
Start: 2023-05-03 | End: 2023-05-03

## 2023-05-03 RX ORDER — PANTOPRAZOLE SODIUM 40 MG/10ML
40 INJECTION, POWDER, LYOPHILIZED, FOR SOLUTION INTRAVENOUS EVERY 12 HOURS SCHEDULED
Status: DISCONTINUED | OUTPATIENT
Start: 2023-05-03 | End: 2023-05-08

## 2023-05-03 RX ORDER — METFORMIN HYDROCHLORIDE 500 MG/1
500 TABLET, EXTENDED RELEASE ORAL 2 TIMES DAILY WITH MEALS
COMMUNITY

## 2023-05-03 RX ORDER — PROPOFOL 10 MG/ML
5-50 INJECTION, EMULSION INTRAVENOUS
Status: DISCONTINUED | OUTPATIENT
Start: 2023-05-03 | End: 2023-05-04

## 2023-05-03 RX ORDER — METRONIDAZOLE 500 MG/100ML
500 INJECTION, SOLUTION INTRAVENOUS ONCE
Status: DISCONTINUED | OUTPATIENT
Start: 2023-05-03 | End: 2023-05-03 | Stop reason: SDUPTHER

## 2023-05-03 RX ORDER — POTASSIUM CHLORIDE 14.9 MG/ML
20 INJECTION INTRAVENOUS
Status: DISPENSED | OUTPATIENT
Start: 2023-05-03 | End: 2023-05-03

## 2023-05-03 RX ORDER — CALCIUM GLUCONATE 20 MG/ML
2 INJECTION, SOLUTION INTRAVENOUS ONCE
Status: COMPLETED | OUTPATIENT
Start: 2023-05-03 | End: 2023-05-03

## 2023-05-03 RX ORDER — MIDAZOLAM HYDROCHLORIDE 2 MG/2ML
INJECTION, SOLUTION INTRAMUSCULAR; INTRAVENOUS AS NEEDED
Status: DISCONTINUED | OUTPATIENT
Start: 2023-05-03 | End: 2023-05-03

## 2023-05-03 RX ORDER — ROCURONIUM BROMIDE 10 MG/ML
INJECTION, SOLUTION INTRAVENOUS AS NEEDED
Status: DISCONTINUED | OUTPATIENT
Start: 2023-05-03 | End: 2023-05-03

## 2023-05-03 RX ORDER — CHLORHEXIDINE GLUCONATE 0.12 MG/ML
15 RINSE ORAL EVERY 12 HOURS SCHEDULED
Status: CANCELLED | OUTPATIENT
Start: 2023-05-03

## 2023-05-03 RX ORDER — METOCLOPRAMIDE HYDROCHLORIDE 5 MG/ML
10 INJECTION INTRAMUSCULAR; INTRAVENOUS EVERY 6 HOURS PRN
Status: DISCONTINUED | OUTPATIENT
Start: 2023-05-03 | End: 2023-05-15 | Stop reason: HOSPADM

## 2023-05-03 RX ORDER — PROPOFOL 10 MG/ML
INJECTION, EMULSION INTRAVENOUS AS NEEDED
Status: DISCONTINUED | OUTPATIENT
Start: 2023-05-03 | End: 2023-05-03

## 2023-05-03 RX ORDER — SUCCINYLCHOLINE/SOD CL,ISO/PF 100 MG/5ML
SYRINGE (ML) INTRAVENOUS AS NEEDED
Status: DISCONTINUED | OUTPATIENT
Start: 2023-05-03 | End: 2023-05-03

## 2023-05-03 RX ORDER — HYDROMORPHONE HCL/PF 1 MG/ML
1 SYRINGE (ML) INJECTION ONCE
Status: COMPLETED | OUTPATIENT
Start: 2023-05-03 | End: 2023-05-03

## 2023-05-03 RX ORDER — MAGNESIUM SULFATE HEPTAHYDRATE 40 MG/ML
2 INJECTION, SOLUTION INTRAVENOUS ONCE
Status: COMPLETED | OUTPATIENT
Start: 2023-05-03 | End: 2023-05-03

## 2023-05-03 RX ORDER — LIDOCAINE 50 MG/G
1 PATCH TOPICAL DAILY
Status: DISCONTINUED | OUTPATIENT
Start: 2023-05-03 | End: 2023-05-15 | Stop reason: HOSPADM

## 2023-05-03 RX ORDER — FENTANYL CITRATE 50 UG/ML
INJECTION, SOLUTION INTRAMUSCULAR; INTRAVENOUS AS NEEDED
Status: DISCONTINUED | OUTPATIENT
Start: 2023-05-03 | End: 2023-05-03

## 2023-05-03 RX ORDER — CHLORHEXIDINE GLUCONATE 0.12 MG/ML
15 RINSE ORAL EVERY 12 HOURS SCHEDULED
Status: DISCONTINUED | OUTPATIENT
Start: 2023-05-03 | End: 2023-05-15 | Stop reason: HOSPADM

## 2023-05-03 RX ORDER — FENTANYL CITRATE 50 UG/ML
50 INJECTION, SOLUTION INTRAMUSCULAR; INTRAVENOUS ONCE
Status: COMPLETED | OUTPATIENT
Start: 2023-05-03 | End: 2023-05-03

## 2023-05-03 RX ORDER — METRONIDAZOLE 500 MG/100ML
500 INJECTION, SOLUTION INTRAVENOUS EVERY 8 HOURS
Status: DISCONTINUED | OUTPATIENT
Start: 2023-05-03 | End: 2023-05-04

## 2023-05-03 RX ORDER — CALCIUM CHLORIDE 100 MG/ML
INJECTION INTRAVENOUS; INTRAVENTRICULAR AS NEEDED
Status: DISCONTINUED | OUTPATIENT
Start: 2023-05-03 | End: 2023-05-03

## 2023-05-03 RX ORDER — PROPOFOL 10 MG/ML
INJECTION, EMULSION INTRAVENOUS CONTINUOUS PRN
Status: DISCONTINUED | OUTPATIENT
Start: 2023-05-03 | End: 2023-05-03

## 2023-05-03 RX ORDER — HYDROMORPHONE HCL/PF 1 MG/ML
0.5 SYRINGE (ML) INJECTION ONCE
Status: DISCONTINUED | OUTPATIENT
Start: 2023-05-03 | End: 2023-05-03

## 2023-05-03 RX ORDER — HYDROMORPHONE HCL IN WATER/PF 6 MG/30 ML
0.2 PATIENT CONTROLLED ANALGESIA SYRINGE INTRAVENOUS EVERY 4 HOURS PRN
Status: DISCONTINUED | OUTPATIENT
Start: 2023-05-03 | End: 2023-05-03

## 2023-05-03 RX ORDER — GLIMEPIRIDE 2 MG/1
2 TABLET ORAL
COMMUNITY

## 2023-05-03 RX ORDER — FENTANYL CITRATE 50 UG/ML
50 INJECTION, SOLUTION INTRAMUSCULAR; INTRAVENOUS
Status: DISCONTINUED | OUTPATIENT
Start: 2023-05-03 | End: 2023-05-04

## 2023-05-03 RX ORDER — DOCUSATE SODIUM 100 MG/1
100 CAPSULE, LIQUID FILLED ORAL 2 TIMES DAILY
Status: DISCONTINUED | OUTPATIENT
Start: 2023-05-03 | End: 2023-05-15 | Stop reason: HOSPADM

## 2023-05-03 RX ORDER — METRONIDAZOLE 500 MG/100ML
500 INJECTION, SOLUTION INTRAVENOUS ONCE
Status: COMPLETED | OUTPATIENT
Start: 2023-05-03 | End: 2023-05-03

## 2023-05-03 RX ORDER — ASPIRIN 81 MG/1
324 TABLET, CHEWABLE ORAL DAILY
Status: DISCONTINUED | OUTPATIENT
Start: 2023-05-03 | End: 2023-05-03

## 2023-05-03 RX ADMIN — Medication 2000 MG: at 08:43

## 2023-05-03 RX ADMIN — FENTANYL CITRATE 50 MCG: 50 INJECTION, SOLUTION INTRAMUSCULAR; INTRAVENOUS at 04:32

## 2023-05-03 RX ADMIN — FENTANYL CITRATE 50 MCG: 50 INJECTION, SOLUTION INTRAMUSCULAR; INTRAVENOUS at 05:21

## 2023-05-03 RX ADMIN — FENTANYL CITRATE 50 MCG: 50 INJECTION INTRAMUSCULAR; INTRAVENOUS at 19:09

## 2023-05-03 RX ADMIN — CEFEPIME 2000 MG: 2 INJECTION, POWDER, FOR SOLUTION INTRAVENOUS at 21:19

## 2023-05-03 RX ADMIN — SODIUM CHLORIDE, SODIUM GLUCONATE, SODIUM ACETATE, POTASSIUM CHLORIDE, MAGNESIUM CHLORIDE, SODIUM PHOSPHATE, DIBASIC, AND POTASSIUM PHOSPHATE 125 ML/HR: .53; .5; .37; .037; .03; .012; .00082 INJECTION, SOLUTION INTRAVENOUS at 06:32

## 2023-05-03 RX ADMIN — FENTANYL CITRATE 50 MCG: 50 INJECTION, SOLUTION INTRAMUSCULAR; INTRAVENOUS at 04:23

## 2023-05-03 RX ADMIN — LIDOCAINE HYDROCHLORIDE 10 ML: 10 INJECTION, SOLUTION EPIDURAL; INFILTRATION; INTRACAUDAL at 04:33

## 2023-05-03 RX ADMIN — SODIUM CHLORIDE: 0.9 INJECTION, SOLUTION INTRAVENOUS at 04:23

## 2023-05-03 RX ADMIN — METOPROLOL TARTRATE 25 MG: 25 TABLET, FILM COATED ORAL at 21:19

## 2023-05-03 RX ADMIN — MIDAZOLAM HYDROCHLORIDE 2 MG: 1 INJECTION, SOLUTION INTRAMUSCULAR; INTRAVENOUS at 17:34

## 2023-05-03 RX ADMIN — IOHEXOL 115 ML: 350 INJECTION, SOLUTION INTRAVENOUS at 07:20

## 2023-05-03 RX ADMIN — ALBUMIN (HUMAN): 12.5 INJECTION, SOLUTION INTRAVENOUS at 05:30

## 2023-05-03 RX ADMIN — INSULIN LISPRO 1 UNITS: 100 INJECTION, SOLUTION INTRAVENOUS; SUBCUTANEOUS at 20:03

## 2023-05-03 RX ADMIN — MAGNESIUM SULFATE HEPTAHYDRATE 2 G: 40 INJECTION, SOLUTION INTRAVENOUS at 20:04

## 2023-05-03 RX ADMIN — CALCIUM CHLORIDE 0.5 G: 100 INJECTION INTRAVENOUS; INTRAVENTRICULAR at 17:09

## 2023-05-03 RX ADMIN — CALCIUM GLUCONATE 2 G: 20 INJECTION, SOLUTION INTRAVENOUS at 15:32

## 2023-05-03 RX ADMIN — LACOSAMIDE 100 MG: 100 TABLET, FILM COATED ORAL at 21:20

## 2023-05-03 RX ADMIN — METRONIDAZOLE 500 MG: 500 INJECTION, SOLUTION INTRAVENOUS at 18:53

## 2023-05-03 RX ADMIN — HYDROMORPHONE HYDROCHLORIDE 1 MG: 1 INJECTION, SOLUTION INTRAMUSCULAR; INTRAVENOUS; SUBCUTANEOUS at 02:04

## 2023-05-03 RX ADMIN — CALCIUM CHLORIDE 0.5 G: 100 INJECTION INTRAVENOUS; INTRAVENTRICULAR at 17:08

## 2023-05-03 RX ADMIN — POTASSIUM CHLORIDE 20 MEQ: 14.9 INJECTION, SOLUTION INTRAVENOUS at 06:53

## 2023-05-03 RX ADMIN — CHLORHEXIDINE GLUCONATE 0.12% ORAL RINSE 15 ML: 1.2 LIQUID ORAL at 21:20

## 2023-05-03 RX ADMIN — SODIUM CHLORIDE 1000 ML: 0.9 INJECTION, SOLUTION INTRAVENOUS at 02:12

## 2023-05-03 RX ADMIN — PANTOPRAZOLE SODIUM 40 MG: 40 INJECTION, POWDER, FOR SOLUTION INTRAVENOUS at 20:04

## 2023-05-03 RX ADMIN — CHLORHEXIDINE GLUCONATE 0.12% ORAL RINSE 15 ML: 1.2 LIQUID ORAL at 08:13

## 2023-05-03 RX ADMIN — SODIUM CHLORIDE, SODIUM GLUCONATE, SODIUM ACETATE, POTASSIUM CHLORIDE, MAGNESIUM CHLORIDE, SODIUM PHOSPHATE, DIBASIC, AND POTASSIUM PHOSPHATE 1000 ML: .53; .5; .37; .037; .03; .012; .00082 INJECTION, SOLUTION INTRAVENOUS at 20:11

## 2023-05-03 RX ADMIN — METOCLOPRAMIDE 10 MG: 5 INJECTION, SOLUTION INTRAMUSCULAR; INTRAVENOUS at 10:05

## 2023-05-03 RX ADMIN — Medication 100 MG: at 16:30

## 2023-05-03 RX ADMIN — ALBUMIN (HUMAN): 12.5 INJECTION, SOLUTION INTRAVENOUS at 17:19

## 2023-05-03 RX ADMIN — SODIUM CHLORIDE: 9 INJECTION, SOLUTION INTRAVENOUS at 18:13

## 2023-05-03 RX ADMIN — DOCUSATE SODIUM 100 MG: 100 CAPSULE, LIQUID FILLED ORAL at 08:14

## 2023-05-03 RX ADMIN — ONDANSETRON 4 MG: 2 INJECTION INTRAMUSCULAR; INTRAVENOUS at 01:34

## 2023-05-03 RX ADMIN — METHOCARBAMOL TABLETS 500 MG: 500 TABLET, COATED ORAL at 14:32

## 2023-05-03 RX ADMIN — PROPOFOL 30 MCG/KG/MIN: 10 INJECTION, EMULSION INTRAVENOUS at 18:18

## 2023-05-03 RX ADMIN — PROPOFOL 50 MCG/KG/MIN: 10 INJECTION, EMULSION INTRAVENOUS at 18:53

## 2023-05-03 RX ADMIN — FENTANYL CITRATE 50 MCG: 50 INJECTION INTRAMUSCULAR; INTRAVENOUS at 01:27

## 2023-05-03 RX ADMIN — IOHEXOL 110 ML: 350 INJECTION, SOLUTION INTRAVENOUS at 18:55

## 2023-05-03 RX ADMIN — LIDOCAINE 5% 1 PATCH: 700 PATCH TOPICAL at 14:32

## 2023-05-03 RX ADMIN — FENTANYL CITRATE 50 MCG: 50 INJECTION, SOLUTION INTRAMUSCULAR; INTRAVENOUS at 04:55

## 2023-05-03 RX ADMIN — IOHEXOL 100 ML: 350 INJECTION, SOLUTION INTRAVENOUS at 02:35

## 2023-05-03 RX ADMIN — CALCIUM CHLORIDE 1 G: 100 INJECTION INTRAVENOUS; INTRAVENTRICULAR at 16:56

## 2023-05-03 RX ADMIN — POTASSIUM CHLORIDE 20 MEQ: 14.9 INJECTION, SOLUTION INTRAVENOUS at 08:23

## 2023-05-03 RX ADMIN — CEFAZOLIN 2000 MG: 1 INJECTION, POWDER, FOR SOLUTION INTRAMUSCULAR; INTRAVENOUS at 04:32

## 2023-05-03 RX ADMIN — NOREPINEPHRINE BITARTRATE 8 MCG/MIN: 1 INJECTION INTRAVENOUS at 16:22

## 2023-05-03 RX ADMIN — ROCURONIUM BROMIDE 20 MG: 10 INJECTION, SOLUTION INTRAVENOUS at 18:26

## 2023-05-03 RX ADMIN — ASPIRIN 324 MG: 81 TABLET, CHEWABLE ORAL at 15:10

## 2023-05-03 RX ADMIN — HYDROMORPHONE HYDROCHLORIDE 0.2 MG: 0.2 INJECTION, SOLUTION INTRAMUSCULAR; INTRAVENOUS; SUBCUTANEOUS at 08:14

## 2023-05-03 RX ADMIN — ROCURONIUM BROMIDE 20 MG: 10 INJECTION, SOLUTION INTRAVENOUS at 17:18

## 2023-05-03 RX ADMIN — SODIUM CHLORIDE, SODIUM GLUCONATE, SODIUM ACETATE, POTASSIUM CHLORIDE, MAGNESIUM CHLORIDE, SODIUM PHOSPHATE, DIBASIC, AND POTASSIUM PHOSPHATE 125 ML/HR: .53; .5; .37; .037; .03; .012; .00082 INJECTION, SOLUTION INTRAVENOUS at 03:35

## 2023-05-03 RX ADMIN — ACETAMINOPHEN 975 MG: 325 TABLET ORAL at 14:31

## 2023-05-03 RX ADMIN — ACETAMINOPHEN 975 MG: 325 TABLET ORAL at 21:19

## 2023-05-03 RX ADMIN — IOHEXOL 100 ML: 350 INJECTION, SOLUTION INTRAVENOUS at 16:09

## 2023-05-03 RX ADMIN — PROPOFOL 120 MG: 10 INJECTION, EMULSION INTRAVENOUS at 16:30

## 2023-05-03 RX ADMIN — PROPOFOL 25 MCG/KG/MIN: 10 INJECTION, EMULSION INTRAVENOUS at 21:20

## 2023-05-03 RX ADMIN — Medication 2000 UNITS: at 03:15

## 2023-05-03 RX ADMIN — SODIUM CHLORIDE, SODIUM GLUCONATE, SODIUM ACETATE, POTASSIUM CHLORIDE, MAGNESIUM CHLORIDE, SODIUM PHOSPHATE, DIBASIC, AND POTASSIUM PHOSPHATE 100 ML/HR: .53; .5; .37; .037; .03; .012; .00082 INJECTION, SOLUTION INTRAVENOUS at 21:29

## 2023-05-03 RX ADMIN — ONDANSETRON 4 MG: 2 INJECTION INTRAMUSCULAR; INTRAVENOUS at 06:32

## 2023-05-03 RX ADMIN — METHOCARBAMOL TABLETS 500 MG: 500 TABLET, COATED ORAL at 21:19

## 2023-05-03 RX ADMIN — ONDANSETRON 4 MG: 2 INJECTION INTRAMUSCULAR; INTRAVENOUS at 03:55

## 2023-05-03 RX ADMIN — SODIUM CHLORIDE: 0.9 INJECTION, SOLUTION INTRAVENOUS at 16:22

## 2023-05-03 RX ADMIN — ROCURONIUM BROMIDE 30 MG: 10 INJECTION, SOLUTION INTRAVENOUS at 16:56

## 2023-05-03 RX ADMIN — LACOSAMIDE 100 MG: 100 TABLET, FILM COATED ORAL at 11:29

## 2023-05-03 RX ADMIN — SODIUM CHLORIDE: 0.9 INJECTION, SOLUTION INTRAVENOUS at 18:06

## 2023-05-03 RX ADMIN — METRONIDAZOLE 500 MG: 500 INJECTION, SOLUTION INTRAVENOUS at 09:17

## 2023-05-03 RX ADMIN — ALBUMIN (HUMAN): 12.5 INJECTION, SOLUTION INTRAVENOUS at 04:23

## 2023-05-03 RX ADMIN — DESMOPRESSIN ACETATE 29.2 MCG: 4 INJECTION, SOLUTION INTRAVENOUS; SUBCUTANEOUS at 03:21

## 2023-05-03 RX ADMIN — METOPROLOL TARTRATE 25 MG: 25 TABLET, FILM COATED ORAL at 08:15

## 2023-05-03 RX ADMIN — SODIUM CHLORIDE: 9 INJECTION, SOLUTION INTRAVENOUS at 16:38

## 2023-05-03 NOTE — ED ATTENDING ATTESTATION
5/3/2023  I, Minoo Thompson DO, saw and evaluated the patient  I have discussed the patient with the resident/non-physician practitioner and agree with the resident's/non-physician practitioner's findings, Plan of Care, and MDM as documented in the resident's/non-physician practitioner's note, except where noted  All available labs and Radiology studies were reviewed  I was present for key portions of any procedure(s) performed by the resident/non-physician practitioner and I was immediately available to provide assistance  At this point I agree with the current assessment done in the Emergency Department  I have conducted an independent evaluation of this patient a history and physical is as follows:    65-year-old male coming into the ED by ambulance for acute left-sided abdominal pain  He states it started just before midnight tonight around 11:30 PM   Pain was sudden onset severe  He denies any recent trauma  He does take Xarelto per medication list and family  On physical exam: pt is ill appearing, in severe pain distress  mucous membranes moist   CTA b/l , heart RRR  Abdomen is distended, left greater than right, with diffuse abdominal tenderness, with guarding and rebound on the left side  Neuro intact, gcs 15  Cap refill < 2 sec, skin warm and dry  Pt sent for stat Abd/pelvis  Case d/w trauma/gen surg Dr Kathy Paula shows active hemorrhage to L kidney  Admit to ICU on surgery's service, IR called for stat IR embolization        ED Course         Critical Care Time  CriticalCare Time    Date/Time: 5/3/2023 3:44 AM  Performed by: Minoo Thompson DO  Authorized by: Minoo Thompson DO     Critical care provider statement:     Critical care time (minutes):  45

## 2023-05-03 NOTE — H&P
[facility]  H&P  Name: Ofelia Kussmaul II 72 y o  male I MRN: [id number]  Unit/Bed#: ED-28 I Date of Admission: 5/3/2023   Date of Service: 5/3/2023 I Hospital Day: 0      Assessment/Plan   * Kidney capsule rupture, left, initial encounter  Assessment & Plan  - Spontaneous rupture left kidney with active extravasation of contrast   - Stat IR consult - plan for renal angiogram and possible embolism  - Initially hypertensive then hypotensive responsive to fluids  - Emergent transfusion 2u uncrossmatched PRBC now with 2u crossmatched on hold  - Admit to ICU     Leukemia [facility])  Assessment & Plan  - s/p chemotherapy  - currently in remission for 14 years    Seizure disorder [facility])  Assessment & Plan  - history of seizure disorder controlled with Vimpat  - Last seizure July 2022 while off medication due to medication cost    CAD (coronary artery disease)  Assessment & Plan  - s/p CABG in 2020  -     Type 2 diabetes mellitus ([facility] [address] )  Assessment & Plan  Lab Results   Component Value Date    HGBA1C 5 9 (H) 04/20/2023       No results for input(s): POCGLU in the last 72 hours  Blood Sugar Average: Last 72 hrs:  - Hold home medications   - Start sliding scale insulin q6h with q6h glucose checks        Trauma Alert: Evaluation; trauma team notified at 0200 via in person   Model of Arrival: Ambulance    Trauma Team: Attending Valarie Mcgovern and MAGNUS Guerra 49  Consultants:     Other: {IR - STAT consult; notified at [phone] via phone; History of Present Illness     Chief Complaint: left abdominal pain  Mechanism:Other: No traumatic injuries     HPI:    Ofelia Kussmaul II is a 72 y o  male with PMH Seizure disorder, DM type 2, CAD, leukemia in remission, who presents with left sided abdominal pain  He reports the pain started suddenly this evening around 11:30 and was severe at onset  He felt associated malaise, nausea, and generally unwell  He denies recent traumatic injury, falls, vigorous activity, illness  Review of Systems   Constitutional: Negative for activity change, appetite change, diaphoresis, fatigue and fever  HENT: Negative for congestion, ear discharge, ear pain, facial swelling, hearing loss, mouth sores, nosebleeds, postnasal drip, rhinorrhea, sinus pressure, sinus pain, sneezing and sore throat  Eyes: Negative for photophobia, pain and redness  Respiratory: Negative for cough, chest tightness, shortness of breath and wheezing  Cardiovascular: Negative for chest pain and palpitations  Gastrointestinal: Negative for abdominal distention, abdominal pain, blood in stool, constipation, diarrhea, nausea and vomiting  Genitourinary: Negative for dysuria, frequency, hematuria and urgency  Musculoskeletal: Negative for back pain and neck pain  Skin: Negative for wound  Neurological: Negative for dizziness, seizures, syncope, weakness, numbness and headaches  12-point, complete review of systems was reviewed and negative except as stated above  Historical Information     Past Medical History:   Diagnosis Date   • CAD (coronary artery disease)    • DM (diabetes mellitus) ([facility] )    • Leukemia ([place] [address] )    • Seizure disorder ([place] [address] )      Past Surgical History:   Procedure Laterality Date   • CORONARY ARTERY BYPASS GRAFT             Immunization History   Administered Date(s) Administered   • COVID-19 PFIZER VACCINE 0 3 ML IM 03/22/2021, 04/14/2021   • Td (adult), adsorbed 06/01/2007     Last Tetanus: N/A  Family History: Non-contributory    1  Before the illness or injury that brought you to the Emergency, did you need someone to help you on a regular basis? 0=No   2  Since the illness or injury that brought you to the Emergency, have you needed more help than usual to take care of yourself? 0=No   3  Have you been hospitalized for one or more nights during the past 6 months (excluding a stay in the Emergency Department)? 0=No   4  In general, do you see well? 0=Yes   5   In general, do you have serious problems with your memory? 0=No   6  Do you take more than three different medications everyday? 1=Yes   TOTAL   1     Did you order a geriatric consult if the score was 2 or greater?: n/a     Meds/Allergies   all current active meds have been reviewed     Allergies   Allergen Reactions   • Phenytoin Rash     Red  Jason up arm to head         Objective   Initial Vitals:   Temperature: 97 5 °F (36 4 °C) (05/03/23 0112)  Pulse: (!) 52 (05/03/23 0112)  Respirations: 18 (05/03/23 0112)  Blood Pressure: (!) 209/125 (05/03/23 0112)    Primary Survey:   Airway:        Status: patent;        Pre-hospital Interventions: none        Hospital Interventions: none  Breathing:        Pre-hospital Interventions: none       Effort: normal       Right breath sounds: normal       Left breath sounds: normal  Circulation:        Rhythm: regular       Rate: regular   Right Pulses Left Pulses    R radial: 2+  R femoral: 2+  R pedal: 2+     L radial: 2+  L femoral: 2+  L pedal: 2+       Disability:        GCS: Eye: 4; Verbal: 5 Motor: 6 Total: 15       Right Pupil: round;  reactive         Left Pupil:  round;  reactive      R Motor Strength L Motor Strength    R : 5/5  R dorsiflex: 5/5  R plantarflex: 5/5 L : 5/5  L dorsiflex: 5/5  L plantarflex: 5/5        Sensory:  No sensory deficit  Exposure:       Completed: Yes      Secondary Survey:  Physical Exam  Vitals and nursing note reviewed  Constitutional:       General: He is in acute distress  Appearance: Normal appearance  He is ill-appearing  He is not toxic-appearing  HENT:      Head: Normocephalic and atraumatic  Right Ear: Tympanic membrane normal       Left Ear: Tympanic membrane normal       Nose: Nose normal  No congestion or rhinorrhea  Mouth/Throat:      Mouth: Mucous membranes are moist       Pharynx: Oropharynx is clear  No oropharyngeal exudate  Eyes:      Extraocular Movements: Extraocular movements intact        Conjunctiva/sclera: Conjunctivae normal       Pupils: Pupils are equal, round, and reactive to light  Cardiovascular:      Rate and Rhythm: Normal rate and regular rhythm  Heart sounds: No murmur heard  No friction rub  No gallop  Pulmonary:      Effort: Pulmonary effort is normal       Breath sounds: Normal breath sounds  No wheezing, rhonchi or rales  Abdominal:      General: Abdomen is flat  There is distension  Palpations: Abdomen is soft  Tenderness: There is abdominal tenderness (Left sided with guarding)  There is guarding  There is no rebound  Musculoskeletal:      Right shoulder: Normal       Left shoulder: Normal       Right upper arm: Normal       Left upper arm: Normal       Right elbow: Normal       Left elbow: Normal       Right forearm: Normal       Left forearm: Normal       Right wrist: Normal       Left wrist: Normal       Right hand: Normal       Left hand: Normal       Cervical back: Normal range of motion  No deformity or tenderness  Thoracic back: No deformity or tenderness  Lumbar back: Normal  No swelling, deformity or tenderness  Right hip: Normal       Left hip: Normal       Right upper leg: Normal       Left upper leg: Normal       Right knee: Normal       Left knee: Normal       Right lower leg: Normal       Left lower leg: Normal       Right ankle: Normal       Left ankle: Normal       Right foot: Normal       Left foot: Normal    Skin:     General: Skin is warm and dry  Capillary Refill: Capillary refill takes less than 2 seconds  Neurological:      General: No focal deficit present  Mental Status: He is alert and oriented to person, place, and time  Invasive Devices     Peripheral Intravenous Line  Duration           Peripheral IV 05/03/23 Dorsal (posterior); Right Forearm <1 day    Peripheral IV 05/03/23 Left Antecubital <1 day              Lab Results:   BMP/CMP:   Lab Results   Component Value Date    SODIUM 141 05/03/2023    K 3 1 (L) 05/03/2023     05/03/2023    CO2 28 05/03/2023    BUN 16 05/03/2023    CREATININE 1 18 05/03/2023    CALCIUM 8 5 05/03/2023    AST 15 05/03/2023    ALT 14 05/03/2023    ALKPHOS 53 05/03/2023    EGFR 61 05/03/2023   , CBC:   Lab Results   Component Value Date    WBC 15 69 (H) 05/03/2023    HGB 12 1 05/03/2023    HCT 44 5 05/03/2023    MCV 98 05/03/2023     05/03/2023    MCH 33 4 05/03/2023    MCHC 34 2 05/03/2023    RDW 11 9 05/03/2023    MPV 10 2 05/03/2023    NRBC 0 05/03/2023   , Coagulation:   Lab Results   Component Value Date    INR 4 33 (H) 05/03/2023    and Troponin: No results found for: TROPONINI    Imaging Results: I have personally reviewed pertinent films in PACS  Chest Xray(s): negative for acute findings   FAST exam(s): N/A   CT Scan(s): positive for acute findings: large subcapsular hematoma compressing the left kidney measuring 13 5x15 8x9 7 and up to 6 2cm in thickness  linear hyperdensities within the posterior aspect of the hematoma compatible with active extrasation  vague hypodensities in the superior aspect ofthe spleen may represent splenic infarct  less likely laceration  Flattening of the IVC   Additional Xray(s): N/A     Other Studies: none    Code Status: Level 1 - Full Code  Advance Directive and Living Will:      Power of :    POLST:    I have spent 65 minutes with Patient and family today in which greater than 50% of this time was spent in counseling/coordination of care regarding Diagnostic results, Prognosis, Risks and benefits of tx options and Counseling / Coordination of care

## 2023-05-03 NOTE — ASSESSMENT & PLAN NOTE
- Spontaneous rupture left kidney with active extravasation of contrast   - Stat IR consult - plan for renal angiogram and possible embolism  - Initially hypertensive then hypotensive responsive to fluids  - Emergent transfusion 2u uncrossmatched PRBC now with 2u crossmatched on hold  - Admit to ICU

## 2023-05-03 NOTE — ANESTHESIA PROCEDURE NOTES
Arterial Line Insertion  Performed by: Debbie Cat CRNA  Authorized by: Adilson Bryant MD   Consent: The procedure was performed in an emergent situation  Risks and benefits: risks, benefits and alternatives were discussed  Consent given by: patient  Patient understanding: patient states understanding of the procedure being performed  Patient consent: the patient's understanding of the procedure matches consent given  Procedure consent: procedure consent matches procedure scheduled  Relevant documents: relevant documents present and verified  Required items: required blood products, implants, devices, and special equipment available  Patient identity confirmed: arm band  Preparation: Patient was prepped and draped in the usual sterile fashion  Indications: hemodynamic monitoring  Orientation:  Left  Location: radial artery  Sedation:  Patient sedated: general anesthesia      Procedure Details:  Kevin's test normal: yes  Needle gauge: 20  Seldinger technique: Seldinger technique used  Number of attempts: 1    Post-procedure:  Post-procedure: dressing applied  Waveform: good waveform and waveform confirmed  Post-procedure CNS: normal  Patient tolerance: patient tolerated the procedure well with no immediate complications

## 2023-05-03 NOTE — ASSESSMENT & PLAN NOTE
"· Noted on CT as above  Per radiology: \"There is vague hypodensity within the superior spleen measuring up to 4 cm  In addition there is a 1 9 cm hypodensity in the superior spleen  There are small foci of hyperdensity within the superior aspect of the spleen  There is perisplenic hemorrhage  \"  · IR consulted as above  · Monitor ABD exams   · Transfuse as above  "

## 2023-05-03 NOTE — SEDATION DOCUMENTATION
Pt tolerated embolization  Vitals stable  Perclose closure device implanted and pressure held at right groin  Guaze and tegaderm to site  Pt being transported to ICU for recovery  Bedrest flat for 3hrs  Knee immobilizer on  Report given to receiving nurse

## 2023-05-03 NOTE — PROGRESS NOTES
Interventional Radiology Note    72year-old male with spontaneous left renal bleed, s/p embolization this morning, now with continued bleeding with hypotension and requiring blood products  We will plan for repeat left renal angiogram with possible embolization of the left kidney      Torri Vance MD  Interventional Radiology

## 2023-05-03 NOTE — CONSULTS
Consultation - Interventional Radiology  Gwendolyn Way II 72 y o  male MRN: [id number]  Unit/Bed#: [facility] Encounter: [id number]        Inpatient Consult to IR  Consult performed by: Joie Cordova MD  Consult ordered by: Sushma Jc PA-C          ASSESSMENT/PLAN:      19-year-old on home anticoagulation for longstanding cardiac issues (prior cath, CABG, stroke)    Overnight with spontaneous left back pain,    CT scan reveals very large perinephric hematoma on the left  Active bleeding    Patient initially hypertensive likely due to pain then ' normotensive' followed by hypotension responsive to fluids    This is concerning for impending hemorrhagic shock in combination with hemoglobin drop    Prior outside noncontrast CT report mentions only cysts    Was instructed to take aspirin on his way into the hospital now given DDAVP  Xarelto has been partially reversed    Trauma team managing this patient appreciate their expertise    Emergency angiography and embolization indicated    Appreciate anesthesia support    Verbal consent obtained from patient      Medical Problems     Problem List     * (Principal) Kidney capsule rupture, left, initial encounter    Type 2 diabetes mellitus ([facility] [address] )    Lab Results   Component Value Date    HGBA1C 5 9 (H) 04/20/2023       No results for input(s): POCGLU in the last 72 hours      Blood Sugar Average: Last 72 hrs:          CAD (coronary artery disease)    Seizure disorder ([facility] [address] )    Leukemia ([facility] [address] )          Reason for Consult / Principal Problem:    HPI: Gwendolyn Way II is a 72y o  year old male who presents with Kidney capsule rupture, left, initial encounter      Review of Systems      Past Medical History:  Past Medical History:   Diagnosis Date   • CAD (coronary artery disease)    • DM (diabetes mellitus) ([address] )    • Leukemia ([address] )    • Seizure disorder ([address] )        Past Surgical History:  Past Surgical History:   Procedure Laterality Date   • CORONARY ARTERY BYPASS GRAFT         Social History:  Social History     Substance and Sexual Activity   Alcohol Use None     Social History     Substance and Sexual Activity   Drug Use Not on file     Social History     Tobacco Use   Smoking Status Not on file   Smokeless Tobacco Not on file       Family History:  No family history on file  Allergies:   Allergies   Allergen Reactions   • Phenytoin Rash     Red  Jason up arm to head         Medications:  (Not in a hospital admission)    Current Facility-Administered Medications   Medication Dose Route Frequency   • acetaminophen (TYLENOL) tablet 975 mg  975 mg Oral Q8H [address]   • cefepime (MAXIPIME) 2 g/50 mL dextrose IVPB  2,000 mg Intravenous Once   • docusate sodium (COLACE) capsule 100 mg  100 mg Oral BID   • HYDROmorphone (DILAUDID) injection 0 5 mg  0 5 mg Intravenous Once   • HYDROmorphone HCl (DILAUDID) injection 0 2 mg  0 2 mg Intravenous Q4H PRN   • insulin lispro (HumaLOG) 100 units/mL subcutaneous injection 1-6 Units  1-6 Units Subcutaneous TID AC   • metoprolol tartrate (LOPRESSOR) tablet 25 mg  25 mg Oral Q12H [address]   • metroNIDAZOLE (FLAGYL) IVPB (premix) 500 mg 100 mL  500 mg Intravenous Once   • multi-electrolyte (PLASMALYTE-A/ISOLYTE-S PH 7 4) IV solution  125 mL/hr Intravenous Continuous   • ondansetron (ZOFRAN) injection 4 mg  4 mg Intravenous Q6H PRN   • oxyCODONE (ROXICODONE) IR tablet 5 mg  5 mg Oral Q4H PRN   • oxyCODONE (ROXICODONE) split tablet 2 5 mg  2 5 mg Oral Q4H PRN   • potassium chloride 20 mEq IVPB (premix)  20 mEq Intravenous Q2H     Facility-Administered Medications Ordered in Other Encounters   Medication Dose Route Frequency   • albumin human (FLEXBUMIN) 5 % injection   Intravenous Continuous PRN   • fentanyl citrate (PF) 100 MCG/2ML   Intravenous PRN   • sodium chloride 0 9 % infusion   Intravenous Continuous PRN       Vitals:  /71 (BP Location: Right arm)   Pulse 97   Temp 97 5 °F (36 4 °C) (Oral)   Resp (!) 26   Wt 97 9 kg (215 lb 13 3 oz)   SpO2 99%   BMI 30 53 kg/m²   Body mass index is 30 53 kg/m²    Weight (last 2 days)     Date/Time Weight    05/03/23 0112 97 9 (215 83)          I/Os:    Intake/Output Summary (Last 24 hours) at 5/3/2023 0424  Last data filed at 5/3/2023 0356  Gross per 24 hour   Intake 50 ml   Output --   Net 50 ml       PHYSICAL EXAM    Alert oriented  Elderly pale dried blood in mouth moving all extremities appropriately  Slight tremor right hand    Right groin and left radial pulses intact    Splinting left side    Abdomen nontender    Lab Results and Cultures:   CBC with diff:   Lab Results   Component Value Date    WBC 15 69 (H) 05/03/2023    HGB 12 1 05/03/2023    HCT 44 5 05/03/2023    MCV 98 05/03/2023     05/03/2023    MCH 33 4 05/03/2023    MCHC 34 2 05/03/2023    RDW 11 9 05/03/2023    MPV 10 2 05/03/2023    NRBC 0 05/03/2023      BMP/CMP:  Lab Results   Component Value Date     01/20/2015    K 3 1 (L) 05/03/2023    K 3 7 01/20/2015     05/03/2023     01/20/2015    CO2 28 05/03/2023    CO2 26 01/20/2015    ANIONGAP 6 01/20/2015    BUN 16 05/03/2023    BUN 9 01/20/2015    CREATININE 1 18 05/03/2023    CREATININE 0 63 01/20/2015    GLUCOSE 131 01/20/2015    CALCIUM 8 5 05/03/2023    CALCIUM 8 1 (L) 01/20/2015    AST 15 05/03/2023    AST 37 01/07/2015    ALT 14 05/03/2023    ALT 56 01/07/2015    ALKPHOS 53 05/03/2023    ALKPHOS 73 01/07/2015    PROT 7 1 01/07/2015    BILITOT 0 6 01/07/2015    EGFR 61 05/03/2023   ,     Coags:   Lab Results   Component Value Date    PTT 41 (H) 05/03/2023    PTT 36 (H) 01/07/2015    INR 4 33 (H) 05/03/2023    INR 1 22 (H) 01/07/2015   ,   Results from last 7 days   Lab Units 05/03/23  0310   PTT seconds 41*   INR  4 33*        Lipid Panel: No results found for: CHOL  No results found for: HDL  No results found for: HDL  No results found for: LDLCALC  No results found for: TRIG    HgbA1c:   Lab Results   Component Value Date    HGBA1C 5 9 (H) 04/20/2023    HGBA1C 5 5 10/10/2022 HGBA1C 5 6 05/27/2022       Blood Culture: No results found for: BLOODCX,   Urinalysis: No results found for: [place], SPECGRAV, PHUR, LEUKOCYTESUR, NITRITE, PROTEINUA, GLUCOSEU, KETONESU, BILIRUBINUR, BLOODU,   Urine Culture: No results found for: URINECX,   Wound Culure:  No results found for: WOUNDCULT    Imaging Studies: I have personally reviewed pertinent films in PACS    Counseling / Coordination of Care  Total time spent today  29 minutes  Greater than 50% of total time was spent with the patient and / or family counseling and / or coordination of care  Thank you for allowing me to participate in the care of Freddie Nye II  Please don't hesitate to contact us with any questions

## 2023-05-03 NOTE — ANESTHESIA POSTPROCEDURE EVALUATION
Post-Op Assessment Note    CV Status:  Stable  Pain Score: 0    Pain management: adequate     Hydration Status:  Euvolemic   PONV Controlled:  Controlled   Airway Patency:  Patent  Airway: intubated      Post Op Vitals Reviewed: Yes      Staff: CRNA, with CRNAs   Comments: patient transported to ICU with assistance of CRNA and IR RNs  patient hemodynamically stable during transport  Report given to ICU RNs and critical care attendings  No notable events documented      BP  190/70   Temp      Pulse 110   Resp  25   SpO2   100%

## 2023-05-03 NOTE — PROGRESS NOTES
[facility]  Interval Progress Note: Critical Care  Name: Taylor Smith  MRN: [id number]  Unit/Bed#: ICU 12 I Date of Admission: 5/3/2023   Date of Service: 5/3/2023 I Hospital Day: 0    Interval Events:  Patient returned to the ICU from IR s/p second IR embolization of left lower renal pole  He was intubated for procedure and intra-procedure received PRBCs 4 units, FFP 4 units, IVF 2000 mL, albumin 5 % 250 mL, and for a short period required vasopressors with Levophed  Prior to completion of the case, Levophed was weaned off  On arrival to the ICU vitals 114 - 127/60 (81) Art line - 28 - 96%  Family at bedside updated  Pertinent New Data:   blood pressure, pulse, temperature, respirations and pulse oximetry as above  Arterial Line  [place] BP    No data recorded   MAP    No data recorded     Physical Exam  Vitals and nursing note reviewed  Exam conducted with a chaperone present  Constitutional:       General: He is not in acute distress  Appearance: He is well-developed and well-groomed  He is ill-appearing  He is not toxic-appearing or diaphoretic  Interventions: He is sedated, intubated and restrained  Comments: Adult male lying in bed intubated and sedated  Ill-appearing however in no acute distress  Nontoxic  HENT:      Head: Normocephalic and atraumatic  Right Ear: External ear normal       Left Ear: External ear normal       Nose: Nose normal  No congestion or rhinorrhea  Mouth/Throat:      Mouth: Mucous membranes are moist       Pharynx: Oropharynx is clear  No oropharyngeal exudate or posterior oropharyngeal erythema  Eyes:      General: No scleral icterus  Conjunctiva/sclera: Conjunctivae normal       Pupils: Pupils are equal, round, and reactive to light  Neck:      Vascular: No carotid bruit  Cardiovascular:      Rate and Rhythm: Regular rhythm  Tachycardia present  Pulses: Normal pulses             Radial pulses are 2+ on the right side and 2+ on the left side  Dorsalis pedis pulses are 2+ on the right side and 2+ on the left side  Heart sounds: Normal heart sounds  No murmur heard  No friction rub  No gallop  Pulmonary:      Effort: Tachypnea present  No respiratory distress  He is intubated  Breath sounds: Normal breath sounds  No decreased breath sounds, wheezing, rhonchi or rales  Abdominal:      General: Abdomen is flat  Bowel sounds are decreased  There is no distension  Palpations: Abdomen is soft  Tenderness: There is no abdominal tenderness  Musculoskeletal:         General: Normal range of motion  Cervical back: Normal range of motion and neck supple  Right lower leg: No edema  Left lower leg: No edema  Comments: PROM intact   Lymphadenopathy:      Cervical: No cervical adenopathy  Skin:     General: Skin is warm and dry  Capillary Refill: Capillary refill takes less than 2 seconds  Coloration: Skin is not pale  Findings: No bruising, ecchymosis, erythema, petechiae or rash  Neurological:      Mental Status: He is unresponsive  GCS: GCS eye subscore is 1  GCS verbal subscore is 1  GCS motor subscore is 1  Cranial Nerves: No facial asymmetry  Comments: Patient returned to ICU from  where he received general anesthesia and paralytics  Per CRNA paralytics were not reversed  Current neurological exam is starting to exhibit gag reflex, cough, corneal reflexes  Not following commands at this time  GCS 3 T  Psychiatric:      Comments:  Assessment deferred  Patient is intubated and sedated  I have personally reviewed pertinent lab results  , CBC:   Lab Results   Component Value Date    WBC 34 32 (HH) 05/03/2023    HGB 9 2 (L) 05/03/2023    HCT 27 (L) 05/03/2023    MCV 98 05/03/2023     05/03/2023    MCH 32 9 05/03/2023    MCHC 33 6 05/03/2023    RDW 13 3 05/03/2023    MPV 10 8 05/03/2023    NRBC 0 05/03/2023   , CMP:   Lab Results   Component Value Date    SODIUM 139 05/03/2023    K 5 3 05/03/2023     05/03/2023    CO2 11 (L) 05/03/2023    CO2 14 (L) 05/03/2023    BUN 26 (H) 05/03/2023    CREATININE 2 41 (H) 05/03/2023    GLUCOSE 298 (H) 05/03/2023    CALCIUM 7 4 (L) 05/03/2023    AST 31 05/03/2023    ALT 16 05/03/2023    ALKPHOS 39 05/03/2023    EGFR 26 05/03/2023   , ABG:   Lab Results   Component Value Date    PHART 7 292 (L) 05/03/2023    NVF2WIZ 39 0 05/03/2023    PO2ART 92 6 05/03/2023    LSA0MQZ 18 4 (L) 05/03/2023    BEART -7 5 05/03/2023    SOURCE Line, Arterial 05/03/2023   , PT/INR:   Lab Results   Component Value Date    INR 1 81 (H) 05/03/2023   , Troponin: No results found for: TROPONINI, Magnesium: No components found for: MAG, Phosphorous:   Lab Results   Component Value Date    PHOS 4 5 (H) 05/03/2023   Post-op end points ordered  chest CT scanI have personally reviewed pertinent reports  and I have personally reviewed pertinent films in PACS      Assessment and Plan  · Left Kidney capsule rupture  · Patient now s/p IR embolization to Left Lower kidney pole x2  · Should patient be unstable or hgb not stablize, would likely need emergent nephrectomy  · Check post-op end points: ABG, lactic, CBC, TEG, BMP, mag, Phos, iCal, INR  · Optimize electrolyte  · Monitor IR groin access site  · Monitor serial abdominal exams  · UO currently clear yellow with small amounts of debris  · If hematuria noted will start CBI  · Start Isolyte 125 mL/hr    · Acute blood loss anemia  · 2/2 kidney capsule rupture and coagulopathy in the setting of home Xarelto and ASA  · Received multiple units of blood products thus far  · PRBC 4 units, FFP 4 units  · Reversed with [employer] and DDAVP  · CBC pending    · Coagulopathy  · At home on Xarelto and ASA  Received ASA in route to ER and once in ICU for EKG changes    · Reversed with [facility] and DDAVP  · Will hold AC/AP for now given ABLA and need for repeat procedure  · Post-op end points as above    · Acute respiratory insuffiency   · Patient intubated for repeat procedure and general anesthesia  Per anesthesia reports, SpO2 stable throughout case  · Will remain intubated overnight  · CXR for ETT placement  · Vent bundle ordered  · ACVC 14/450/40/6  · Titrate FiO2 for SPO2 > 92%      Billing Level:  During this visit, Critical care services were medically necessary as this patient had a high probability of imminent or life-threatening deterioration due to shock, acute kidney injury, acute blood loss anemia and non-traumatic spontaneous left kidney rupture  , required my direct attention, intervention, and personal management to implement the following: CXR interpretation , vent management, fluid management, bedside management, test review and direct patient care  I have personally provided 30 minutes on 05/03/23 of critical care time, exclusive of procedures, teaching, family meetings, and any prior time recorded by providers other than myself      SIGNATURE: Semaj Tovar

## 2023-05-03 NOTE — ASSESSMENT & PLAN NOTE
· Last seizure: July 2022 (was off medication at time of seizure due to cost)  · Currently well controlled with Vimpat  · Holding Vimpat for now  Check renal function for dosing after patient returns from IR

## 2023-05-03 NOTE — CONSULTS
Consultation - Cardiology Team One  Gwendolyn Way II 72 y o  male MRN: [id number]  Unit/Bed#: ICU 12 Encounter: [id number]    Inpatient consult to Cardiology  Consult performed by: KIANNA Thomas  Consult ordered by: KIANNA Masters      Physician Requesting Consult: Yariel Pollack MD  Reason for Consult / Principal Problem: hypotension, tachycardia, ECG changes with hx of CABG  Assessment    1  Hypovolemic/hemorrhagic shock  S/p 1 L IVF bolus and 1 unit PRBCs  Remains hypotensive    2  Sinus tachycardia  In setting of ABLA and hypovolemia    3  Left kidney capsule rupture s/p IR embolization  Presented with sudden onset flank pain and found to have a very large left perinephric hematoma with active bleeding  S/p IR embolization 5/3/23    4  ABLA  hgb 15 on admission, down to 10 5 within 12 hours of arrival  S/p 1 unit PRBCs, ordered for 2 units but has not received the 2nd unit yet    5  CAD s/p remote PCI and most recently CABG x 2 2019 (LIMA to LAD, SVG to OM 1)  Saw primary cardiologist Dr Mukul Rios in April and was without cardiac complaints  Home regimen includes Vytorin and metoprolol tartrate 25 mg BID  He is no longer on ASA due to [facility] [place] with Xarelto    6  PAF  In sinus rhythm/sinus tachycardia today  On metoprolol tartrate 25 mg BID  Xarelto held due to #3 and #4     7   HTN- now hypotensive    8  HLD- LDL 36  On Vytorin at home  9   Thoracic aortic aneurysm- aortic root up to 5 1 cm and ascending thoracic aorta 4 8 cm on CT from 2020  10  CKD- Creatinine up to 1 4, baseline around 1  11  Type 2 DM- A1c 5 9%  12  Hx of Leukemia  13  Hx of CVA    Plan  1  Hypotension and tachycardia likely driven by significant blood loss from renal capsule rupture  Symptoms not similar to prior MIs (NO chest pain reported)  Volume repletion with additional blood products and IVF per critical care/trauma team  2  Echocardiogram  3  Follow up on CT chest/abd/pelvis   4   Hold anticoagulation     History of Present Illness   HPI: Manuel Gonzalez II is a 72y o  year old male with CAD s/p CABG x 2 2019 (LIMA to LAD, SVG to OM1), atrial fibrillation on Xarelto, thoracic aortic aneurysm measuring 4 8 cm (under routine surveillance), HTN, HLD, CKD, type 2 DM, hx of leukoemia, and hx of CVA  He follows with cardiologist Dr Negrito Xie with [facility] and was last seen on 4/10/23 and no changes were made  He presented to the ED early this morning with c/o sudden onset back/flank pain  Imaging revealed a very large left perinephric hematoma with active bleeding  Hemoglobin quickly dropped from 15 2 to 12 1 to 10 3 over span of 5 hours  He was given DDAVP and KCentra as well as 1 unit of PRBCs  He underwent emergent IR embolization via the right groin around 0600  He was initially hypertensive 2/2 the pain but post procedure has been hypotensive and tachycardic  Cardiology consulted for further evaluation and management of his hypotension and tachycardia  Labs: WBC 34 32, hemoglobin most recently 10 5, Creatinine 1 41, K 4 0, Mg 1 8  CT abdomen noted a collapsed IVC consistent with hypovolemia  Troponin is pending  EKG reviewed personally: Sinus tachycardia with hyperacute T waves    Telemetry reviewed personally: Sinus tachycardia    Review of Systems   Cardiovascular: Negative for chest pain  Respiratory: Positive for shortness of breath  Genitourinary: Positive for flank pain (improved)     ROS limited by acuity of condition/symptoms    Historical Information   Past Medical History:   Diagnosis Date   • CAD (coronary artery disease)    • DM [facility]di[facility] mellitus) ([facility] )    • Leukemia ([facility] )    • Seizure disorder ([facility] )      Past Surgical History:   Procedure Laterality Date   • CORONARY ARTERY BYPASS GRAFT       Social History     Substance and Sexual Activity   Alcohol Use None     Social History     Substance and Sexual Activity   Drug Use Not on file     Social History     Tobacco Use   Smoking Status Not on file   Smokeless "Tobacco Not on file     Family History: No family history on file  Meds/Allergies   all current active meds have been reviewed and current meds:   Current Facility-Administered Medications   Medication Dose Route Frequency   • acetaminophen (TYLENOL) tablet 975 mg  975 mg Oral Q8H [address]   • aspirin chewable tablet 324 mg  324 mg Oral Daily   • chlorhexidine (PERIDEX) 0 12 % oral rinse 15 mL  15 mL Mouth/Throat Q12H [address]   • docusate sodium (COLACE) capsule 100 mg  100 mg Oral BID   • HYDROmorphone (DILAUDID) injection 0 5 mg  0 5 mg Intravenous Once   • HYDROmorphone HCl (DILAUDID) injection 0 2 mg  0 2 mg Intravenous Q4H PRN   • insulin lispro (HumaLOG) 100 units/mL subcutaneous injection 1-5 Units  1-5 Units Subcutaneous HS   • insulin lispro (HumaLOG) 100 units/mL subcutaneous injection 1-6 Units  1-6 Units Subcutaneous TID AC   • lacosamide (VIMPAT) tablet 100 mg  100 mg Oral Q12H [address]   • lidocaine (LIDODERM) 5 % patch 1 patch  1 patch Topical Daily   • methocarbamol (ROBAXIN) tablet 500 mg  500 mg Oral Q8H [address]   • metoclopramide (REGLAN) injection 10 mg  10 mg Intravenous Q6H PRN   • metoprolol tartrate (LOPRESSOR) tablet 25 mg  25 mg Oral Q12H JAD   • multi-electrolyte (PLASMALYTE-A/ISOLYTE-S PH 7 4) IV solution  75 mL/hr Intravenous Continuous   • ondansetron (ZOFRAN) injection 4 mg  4 mg Intravenous Q6H PRN   • oxyCODONE (ROXICODONE) IR tablet 5 mg  5 mg Oral Q4H PRN   • oxyCODONE (ROXICODONE) split tablet 2 5 mg  2 5 mg Oral Q4H PRN     multi-electrolyte, 75 mL/hr, Last Rate: 75 mL/hr (23 1409)        Allergies   Allergen Reactions   • Phenytoin Rash     Red  Jason up arm to head         Objective   Vitals: Blood pressure (!) 87/55, pulse (!) 132, temperature 99 2 °F (37 3 °C), temperature source Axillary, resp  rate (!) 24, height 5' 10\" (1 778 m), weight 97 4 kg (214 lb 11 7 oz), SpO2 100 %  , Body mass index is 30 81 kg/m²  ,     Systolic (15TYL), KAA:238 , Min:79 , B     Diastolic (02KPQ), " Av, Min:50, Max:125    Intake/Output Summary (Last 24 hours) at 5/3/2023 1510  Last data filed at 5/3/2023 1201  Gross per 24 hour   Intake 2573 33 ml   Output 375 ml   Net 2198 33 ml     Wt Readings from Last 3 Encounters:   23 97 4 kg (214 lb 11 7 oz)   16 101 kg (222 lb 0 8 oz)   07/08/15 95 7 kg (211 lb)     Invasive Devices     Peripheral Intravenous Line  Duration           Peripheral IV 23 Dorsal (posterior); Right Forearm <1 day    Peripheral IV 23 Left Antecubital <1 day          Drain  Duration           Urethral Catheter Non-latex 18 Fr  <1 day              Physical Exam  Vitals reviewed  Constitutional:       Appearance: He is ill-appearing and diaphoretic  Neck:      Vascular: No hepatojugular reflux or JVD  Cardiovascular:      Rate and Rhythm: Regular rhythm  Tachycardia present  Pulses: Normal pulses  Heart sounds: Normal heart sounds  No murmur heard  No friction rub  No gallop  Pulmonary:      Effort: Tachypnea present  No respiratory distress  Breath sounds: No wheezing or rales  Comments: Decreased at bases  Abdominal:      General: Bowel sounds are normal  There is no distension  Palpations: Abdomen is soft  Tenderness: There is no abdominal tenderness  Musculoskeletal:         General: No tenderness  Normal range of motion  Cervical back: Neck supple  Right lower leg: No edema  Left lower leg: No edema  Skin:     General: Skin is warm  Findings: No erythema  Comments: R groin access site- small amount of bloody drainage on dressing  Soft, no erythema or ecchymosis  Neurological:      Mental Status: He is alert and oriented to person, place, and time       LABORATORY RESULTS:      CBC with diff:   Results from last 7 days   Lab Units 23  1218 23  0833 23  0640 23  0550 23  0310 23  0130   WBC Thousand/uL  --  34 32* 34 02* 29 35*  --  15 69*   HEMOGLOBIN g/dL 10 5* 10 8* 10 9* 10 3* 12 1 15 2   HEMATOCRIT % 31 6* 32 1* 32 1* 30 4*  --  44 5   MCV fL  --  98 98 96  --  98   PLATELETS Thousands/uL  --  246 224 205  --  281   MCH pg  --  32 9 33 1 32 4  --  33 4   MCHC g/dL  --  33 6 34 0 33 9  --  34 2   RDW %  --  13 3 13 2 13 0  --  11 9   MPV fL  --  10 8 10 6 10 2  --  10 2   NRBC AUTO /100 WBCs  --   --  0 0  --  0     CMP:  Results from last 7 days   Lab Units 05/03/23  0833 05/03/23  0640 05/03/23  0130   POTASSIUM mmol/L 4 0 3 8 3 1*   CHLORIDE mmol/L 109* 109* 103   CO2 mmol/L 21 24 28   BUN mg/dL 19 18 16   CREATININE mg/dL 1 41* 1 31* 1 18   CALCIUM mg/dL 7 2* 7 2* 8 5   AST U/L 14 13 15   ALT U/L 10 9 14   ALK PHOS U/L 32* 32* 53   EGFR ml/min/1 73sq m 49 54 61     BMP:  Results from last 7 days   Lab Units 05/03/23  0833 05/03/23  0640 05/03/23  0130   POTASSIUM mmol/L 4 0 3 8 3 1*   CHLORIDE mmol/L 109* 109* 103   CO2 mmol/L 21 24 28   BUN mg/dL 19 18 16   CREATININE mg/dL 1 41* 1 31* 1 18   CALCIUM mg/dL 7 2* 7 2* 8 5     Lab Results   Component Value Date    CREATININE 1 41 (H) 05/03/2023    CREATININE 1 31 (H) 05/03/2023    CREATININE 1 18 05/03/2023      Results from last 7 days   Lab Units 05/03/23  0640   MAGNESIUM mg/dL 1 8*     Results from last 7 days   Lab Units 05/03/23  0640 05/03/23  0310   INR  2 99* 4 33*     Imaging: I have personally reviewed pertinent reports  and I have personally reviewed pertinent films in PACS  XR chest 1 view portable    Result Date: 5/3/2023  Narrative: CHEST INDICATION:   concern for intestinal perf, evaluate for free air  COMPARISON: 1/7/2015 EXAM PERFORMED/VIEWS:  XR CHEST PORTABLE FINDINGS: There are median sternotomy wires indicating prior cardiac surgery  Cardiomediastinal silhouette appears unremarkable  Mild bibasilar atelectasis noted at a small left-sided pleural effusion  No pneumothorax or pulmonary edema  No free air under the diaphragms Osseous structures appear within normal limits for patient age  Impression: No free air is seen under the diaphragms  There is a small left effusion and mild bibasilar subsegmental atelectasis Workstation performed: [id number]     XR abdomen 1 view kub    Result Date: 5/3/2023  Narrative: ABDOMEN INDICATION:   r/o perf  COMPARISON:  None VIEWS:  AP supine FINDINGS: There is a nonobstructive small bowel gas pattern  Moderate stool present in right colon No pathologic calcifications or soft tissue masses  See separate chest x-ray report for description of lung bases Patient is status post lumbar spinal fusion     Impression: No evidence of bowel obstruction Workstation performed: [id number]     CT abdomen pelvis with contrast    Result Date: 5/3/2023  Narrative: CT ABDOMEN AND PELVIS WITH IV CONTRAST INDICATION:   LLQ abdominal pain marked LLQ pain to palpation, focal guarding of LLQ  COMPARISON: Report from prior CT of the abdomen pelvis performed at [facility] on 5/12/2021  TECHNIQUE:  CT examination of the abdomen and pelvis was performed  Multiplanar 2D reformatted images were created from the source data  Radiation dose length product (DLP) for this visit:  1211 mGy-cm   This examination, like all CT scans performed in the [facility], was performed utilizing techniques to minimize radiation dose exposure, including the use of iterative reconstruction and automated exposure control  IV Contrast:  100 mL of iohexol (OMNIPAQUE) Enteric Contrast:  Enteric contrast was not administered  FINDINGS: ABDOMEN LOWER CHEST: Dense opacity in the left lower lobe (series 301, image 21)  Small left low-density pleural effusion  LIVER/BILIARY TREE:  Unremarkable  GALLBLADDER: There are gallstone(s) within the gallbladder, without pericholecystic inflammatory changes  SPLEEN: There is anterior displacement of the spleen  There is vague hypodensity within the superior spleen measuring up to 4 cm (series 301, image 23)   In addition there is a 1 9 cm hypodensity in the superior spleen (series 301, image 27)  There are small foci of hyperdensity within the superior aspect of the spleen (series 301, image 23)  There is perisplenic hemorrhage  PANCREAS: There is a 2 1 cm cystic lesion within the body of the pancreas, probably not significantly changed since prior report  ADRENAL GLANDS:  Unremarkable  KIDNEYS/URETERS: There is a large subcapsular hematoma compressing the left kidney measuring approximately 13 5 x 15 8 x 9 7 cm in size and up to 6 2 cm in thickness  There is irregular complex contour of the kidney  There is a 3 mm left renal calculus  There are linear hyperdensities within the posterior aspect of the hematoma compatible with active extravasation  There is surrounding moderate to large perinephric and retroperitoneal hemorrhage  There are renal cysts  No hydronephrosis  STOMACH AND BOWEL: No bowel obstruction  APPENDIX:  No findings to suggest appendicitis  ABDOMINOPELVIC CAVITY: Trace pelvic fluid  No pneumoperitoneum  No lymphadenopathy  VESSELS: Flattened IVC compatible with hypovolemia  PELVIS REPRODUCTIVE ORGANS: Enlarged prostate  URINARY BLADDER:  Unremarkable  ABDOMINAL WALL/INGUINAL REGIONS: Small fat-containing right inguinal hernia  OSSEOUS STRUCTURES:  No acute fracture or destructive osseous lesion  Spinal degenerative changes are noted  Posterior spinal fixation at L3-L4  Multilevel endplate Schmorl's nodes  Status post tenotomy  Impression: 1  Large subcapsular hematoma compressing the left kidney measuring approximately 13 5 x 15 8 x 9 7 cm and up to 6 2 cm in thickness  Linear hyperdensities within the posterior aspect of the hematoma compatible with active extravasation  There is surrounding perinephric and retroperitoneal hemorrhage  2   Vague hypodensities are seen within the superior aspect of the spleen measuring up to 4 cm and 1 9 cm respectively  These areas may reflect splenic infarction less likely laceration   There are foci of hyperdensity at the superior aspect of the spleen  which was present on prior exam and likely represent a hemangioma versus calcification  3   Flattening of the IVC compatible with hypovolemia  4   Cholelithiasis without evidence of cholecystitis  5   Stable 2 1 cm cystic lesion within the body of the pancreas  6   Dense opacity in the left lower lobe which may be due to atelectasis versus pneumonia  Small left pleural effusion  I personally discussed the left renal and splenic findings of this study with Zuleyma Goel on 5/3/2023 3:17 AM  Workstation performed: [id number]     Thank you for allowing us to participate in this patient's care  This pt will follow up with Dr Eleanor Muñoz once discharged  Counseling / Coordination of Care  Total floor / unit time spent today 45 minutes  Greater than 50% of total time was spent with the patient and / or family counseling and / or coordination of care  A description of the counseling / coordination of care: Review of history, current assessment, development of a plan  Code Status: Level 1 - Full Code    ** Please Note: Dragon 360 Dictation voice to text software may have been used in the creation of this document   **

## 2023-05-03 NOTE — PHYSICAL THERAPY NOTE
Physical Therapy Cancellation Note       05/03/23 1013   PT Last Visit   PT Visit Date 05/03/23   Note Type   Note type Cancelled Session   Cancel Reasons Medical status   Additional Comments PT orders received, chart reviewed  Pt was on bed rest this AM, however per RN, pt continues to have blood at incision site as well as low BP  Will hold evaluation at this time, and follow up as appropriate       Thiago Sizer, PT

## 2023-05-03 NOTE — SEDATION DOCUMENTATION
Pt under care and monitoring of anesthesia team   Please see anesthesia record for vital signs, cardiac, respiratory status, intake and output  Pt sedated and intubated  Positioned on angio table with pressure points padded and Andreea hugger in use

## 2023-05-03 NOTE — ASSESSMENT & PLAN NOTE
- history of seizure disorder controlled with Vimpat  - Last seizure July 2022 while off medication due to medication cost

## 2023-05-03 NOTE — ASSESSMENT & PLAN NOTE
Lab Results   Component Value Date    HGBA1C 5 9 (H) 04/20/2023       No results for input(s): POCGLU in the last 72 hours      Blood Sugar Average: Last 72 hrs:  - Hold home medications   - Start sliding scale insulin q6h with q6h glucose checks

## 2023-05-03 NOTE — ANESTHESIA PREPROCEDURE EVALUATION
Procedure:  IR EMBOLIZATION (SPECIFY VESSEL OR SITE)    Relevant Problems   CARDIO   (+) CAD (coronary artery disease)   (+) Paroxysmal atrial fibrillation (HCC)      ENDO   (+) Type 2 diabetes mellitus (HCC)      /RENAL   (+) Kidney capsule rupture, left, initial encounter      NEURO/PSYCH   (+) Seizure disorder (HCC)      Other   (+) Leukemia (HCC)   (+) Splenic hemorrhage      Suspected early onset parkinson's    Spontaneous rupture of kidney  On anticoagulation (xarelto)  Was Hypertensive to 's  INR 4 3 Hgb dropped from 15 2 --> 12 1    Received Kcentra (2000 units) @0315, DDAVP @0321    Being transfused  NPO @ 8pm    Physical Exam    Airway    Mallampati score: III  TM Distance: >3 FB  Neck ROM: full     Dental   No notable dental hx     Cardiovascular      Pulmonary      Other Findings        Anesthesia Plan  ASA Score- 3 Emergent    Anesthesia Type- general with ASA Monitors  Additional Monitors:   Airway Plan:     Comment: Emergent case, patient gave verbal consent  Patient brought straight to IR by trauma team  Receiving blood  Post op vent discussed  Plan Factors-Exercise tolerance (METS): >4 METS  Chart reviewed  Existing labs reviewed  Patient summary reviewed  Induction- intravenous  Postoperative Plan-     Informed Consent- Anesthetic plan and risks discussed with patient  I personally reviewed this patient with the CRNA  Discussed and agreed on the Anesthesia Plan with the CRNA  Mei Ledbetter

## 2023-05-03 NOTE — BRIEF OP NOTE (RAD/CATH)
INTERVENTIONAL RADIOLOGY PROCEDURE NOTE    Date: 5/3/2023    Procedure: IR ANGIOGRAM  EMBOLIZATION    Procedure Summary     Date:  Room / Location:     Anesthesia Start:  Anesthesia Stop:     Procedure:  Diagnosis:     Scheduled Providers:  Responsible Provider:     Anesthesia Type: Not recorded ASA Status: Not recorded          Preoperative diagnosis:   1  Renal hemorrhage, left    2  Kidney capsule rupture, left, initial encounter    3  Hemorrhagic shock (HCC)         Postoperative diagnosis: Same  Surgeon: Joie Cordova MD     Assistant: None  No qualified resident was available  Blood loss: 0     antibiotics: Ancef    Specimens:    CBC sent at the end of the procedure for new baseline    Findings:     Active bleeding from the lower pole of the left kidney    Numerous small arterial branches feeding this bleeding which is likely venous in origin    Embolization of 4 feeding branches with Gelfoam and coils    The kidney is compressed and distorted by the adjacent hematoma    Right groin access Perclose hemostasis    Complications: None immediate      Anesthesia: MAC sedation

## 2023-05-03 NOTE — NURSING NOTE
Per Dr Manning to give 2 units PRBCs via rapid infuser  Unit Number: [id number] and [id number] verified with Rachelle Perry RN   Both administered via the ranger with the warmer    Anjana Best

## 2023-05-03 NOTE — ANESTHESIA POSTPROCEDURE EVALUATION
Post-Op Assessment Note    CV Status:  Stable  Pain Score: 0    Pain management: adequate     Mental Status:  Arousable   Hydration Status:  Stable   PONV Controlled:  None   Airway Patency:  Patent      Post Op Vitals Reviewed: Yes      Staff: Anesthesiologist, CRNA         No notable events documented      BP      Temp      Pulse     Resp      SpO2      Pt transferred to ICU with RN, monitors, O2; bedside report given

## 2023-05-03 NOTE — ASSESSMENT & PLAN NOTE
Lab Results   Component Value Date    HGBA1C 5 9 (H) 04/20/2023       No results for input(s): POCGLU in the last 72 hours      Blood Sugar Average: Last 72 hrs:     · Holding home glimepiride and metformin for now  · Start accuchecks ACHS with SSI  · Goal blood glucose 140-180  · Avoid hypoglycemia  · Once able to take PO will need CHO controlled diet

## 2023-05-03 NOTE — ED PROVIDER NOTES
History  Chief Complaint   Patient presents with   • Abdominal Pain     Pt reports abd pain since midnight and constipation  Last BM yesterday     Patient is a 56-year-old male who presents to the emergency department in acute distress  He is endorsing marked left lower quadrant abdominal pain  He states this began just prior to arrival approximately 11:30 PM to midnight  Patient states he has not had any recent falls, has not been nauseous or vomiting  Denies any fever or chills  Denies chest pain, shortness of breath  Patient is unable to offer any additional history at this time  He is accompanied by his wife who mentions a history of CAD status post CABG but no additional pertinent medical history  Patient abdomen does not have any scars suggestive of prior abdominal surgeries  Patient reports that his last bowel movement was yesterday  Prior to Admission Medications   Prescriptions Last Dose Informant Patient Reported? Taking?   ezetimibe-simvastatin (VYTORIN) 10-40 mg per tablet   Yes Yes   Sig: Take 1 tablet by mouth daily at bedtime   glimepiride (AMARYL) 2 mg tablet   Yes Yes   Sig: Take 2 mg by mouth every morning before breakfast   lacosamide (Vimpat) 100 mg tablet   Yes Yes   Sig: Take 100 mg by mouth every 12 (twelve) hours   metFORMIN (GLUCOPHAGE-XR) 500 mg 24 hr tablet   Yes Yes   Sig: Take by mouth daily with dinner   metoprolol tartrate (LOPRESSOR) 25 mg tablet   Yes Yes   Sig: Take 25 mg by mouth every 12 (twelve) hours   rivaroxaban (XARELTO) 10 mg tablet   Yes Yes   Sig: Take 20 mg by mouth daily      Facility-Administered Medications: None       No past medical history on file  No past surgical history on file  No family history on file  I have reviewed and agree with the history as documented  No existing history information found  No existing history information found  Review of Systems   Constitutional: Negative  HENT: Negative  Eyes: Negative  Respiratory: Negative  Cardiovascular: Negative  Gastrointestinal: Positive for abdominal pain  Endocrine: Negative  Genitourinary: Negative  Musculoskeletal: Negative  Skin: Negative  Allergic/Immunologic: Negative  Neurological: Negative  Hematological: Negative  Psychiatric/Behavioral: Negative  All other systems reviewed and are negative  Physical Exam  ED Triage Vitals   Temperature Pulse Respirations Blood Pressure SpO2   05/03/23 0112 05/03/23 0112 05/03/23 0112 05/03/23 0112 05/03/23 0112   97 5 °F (36 4 °C) (!) 52 18 (!) 209/125 99 %      Temp Source Heart Rate Source Patient Position - Orthostatic VS BP Location FiO2 (%)   05/03/23 0112 05/03/23 0112 05/03/23 0112 05/03/23 0112 --   Axillary Monitor Lying Right arm       Pain Score       05/03/23 0127       10 - Worst Possible Pain             Orthostatic Vital Signs  Vitals:    05/03/23 0118 05/03/23 0212 05/03/23 0240 05/03/23 0324   BP: (!) 187/108 162/92 (!) 184/105 118/83   Pulse: 63 68 88 90   Patient Position - Orthostatic VS: Lying Lying Lying        Physical Exam  Vitals and nursing note reviewed  Constitutional:       General: He is in acute distress  Appearance: Normal appearance  He is not ill-appearing, toxic-appearing or diaphoretic  HENT:      Head: Normocephalic and atraumatic  Eyes:      General: No scleral icterus  Right eye: No discharge  Left eye: No discharge  Extraocular Movements: Extraocular movements intact  Conjunctiva/sclera: Conjunctivae normal       Pupils: Pupils are equal, round, and reactive to light  Cardiovascular:      Rate and Rhythm: Normal rate  Pulses: Normal pulses  Heart sounds: Normal heart sounds  No murmur heard  No friction rub  No gallop  Pulmonary:      Effort: Pulmonary effort is normal  No respiratory distress  Breath sounds: Normal breath sounds  No stridor  No wheezing, rhonchi or rales     Abdominal: General: Abdomen is protuberant  Bowel sounds are decreased  There is no distension  Palpations: Abdomen is rigid  Tenderness: There is abdominal tenderness in the suprapubic area and left lower quadrant  There is guarding  There is no rebound  Comments: Marked abdominal tenderness to palpation of left lower quadrant, left upper quadrant  Patient does not tolerate any palpation and screams in pain to physical exam   Left side of patient's abdomen is firm  No tenderness on right side of patient's abdomen, no guarding, no rebound  No overlying skin changes  Abdomen is distended  Marked discomfort with flexion of left lower extremity  Musculoskeletal:         General: No swelling  Normal range of motion  Cervical back: Normal range of motion  No rigidity  Right lower leg: No edema  Left lower leg: No edema  Skin:     General: Skin is warm and dry  Capillary Refill: Capillary refill takes less than 2 seconds  Coloration: Skin is not jaundiced  Findings: No bruising or lesion  Neurological:      General: No focal deficit present  Mental Status: He is alert and oriented to person, place, and time  Mental status is at baseline  Psychiatric:         Mood and Affect: Mood normal          Behavior: Behavior normal          Thought Content:  Thought content normal          Judgment: Judgment normal          ED Medications  Medications   sodium chloride 0 9 % bolus 1,000 mL (1,000 mL Intravenous New Bag 5/3/23 0212)   cefepime (MAXIPIME) 2 g/50 mL dextrose IVPB (has no administration in time range)   metroNIDAZOLE (FLAGYL) IVPB (premix) 500 mg 100 mL (has no administration in time range)   desmopressin (DDAVP) 29 2 mcg in sodium chloride 0 9 % 50 mL IVPB (29 2 mcg Intravenous New Bag 5/3/23 5271)   multi-electrolyte (PLASMALYTE-A/ISOLYTE-S PH 7 4) IV solution (has no administration in time range)   docusate sodium (COLACE) capsule 100 mg (has no administration in time range)   ondansetron (ZOFRAN) injection 4 mg (has no administration in time range)   acetaminophen (TYLENOL) tablet 975 mg (has no administration in time range)   oxyCODONE (ROXICODONE) split tablet 2 5 mg (has no administration in time range)   oxyCODONE (ROXICODONE) IR tablet 5 mg (has no administration in time range)   HYDROmorphone HCl (DILAUDID) injection 0 2 mg (has no administration in time range)   metoprolol tartrate (LOPRESSOR) tablet 25 mg (has no administration in time range)   insulin lispro (HumaLOG) 100 units/mL subcutaneous injection 1-6 Units (has no administration in time range)   HYDROmorphone (DILAUDID) injection 0 5 mg (has no administration in time range)   potassium chloride 20 mEq IVPB (premix) (has no administration in time range)   fentanyl citrate (PF) 100 MCG/2ML 50 mcg (50 mcg Intravenous Given 5/3/23 0127)   ondansetron (ZOFRAN) injection 4 mg (4 mg Intravenous Given 5/3/23 0134)   HYDROmorphone (DILAUDID) injection 1 mg (1 mg Intravenous Given 5/3/23 0204)   iohexol (OMNIPAQUE) 350 MG/ML injection (SINGLE-DOSE) 100 mL (100 mL Intravenous Given 5/3/23 0235)   prothrombin complex concentrate (human) (Kcentra) 2,000 Units (2,000 Units Intravenous Given 5/3/23 0315)       Diagnostic Studies  Results Reviewed     Procedure Component Value Units Date/Time    Blood culture #1 [[id number]] Collected: 05/03/23 0310    Lab Status: In process Specimen: Blood from Arm, Right Updated: 05/03/23 0319    Blood culture #2 [[id number]] Collected: 05/03/23 0310    Lab Status: In process Specimen: Blood from Arm, Right Updated: 05/03/23 0318    TEG 6 Global Hemostasis with Lysis [[id number]]     Lab Status: No result Specimen: Blood     Hemoglobin [[id number]] Collected: 05/03/23 0310    Lab Status: In process Specimen: Blood from Arm, Right Updated: 05/03/23 Rocio [[id number]] Collected: 05/03/23 0310    Lab Status:  In process Specimen: Blood from Arm, Right Updated: 05/03/23 8702    APTT [[id number]] Collected: 05/03/23 0310    Lab Status:  In process Specimen: Blood from Arm, Right Updated: 05/03/23 0318    Hemoglobin [[id number]]     Lab Status: No result Specimen: Blood     Comprehensive metabolic panel [[id number]]  (Abnormal) Collected: 05/03/23 0130    Lab Status: Final result Specimen: Blood from Arm, Left Updated: 05/03/23 0159     Sodium 141 mmol/L      Potassium 3 1 mmol/L      Chloride 103 mmol/L      CO2 28 mmol/L      ANION GAP 10 mmol/L      BUN 16 mg/dL      Creatinine 1 18 mg/dL      Glucose 265 mg/dL      Calcium 8 5 mg/dL      AST 15 U/L      ALT 14 U/L      Alkaline Phosphatase 53 U/L      Total Protein 6 1 g/dL      Albumin 3 9 g/dL      Total Bilirubin 0 50 mg/dL      eGFR 61 ml/min/1 73sq m     Narrative:      [place] guidelines for Chronic Kidney Disease (CKD):   •  Stage 1 with normal or high GFR (GFR > 90 mL/min/1 73 square meters)  •  Stage 2 Mild CKD (GFR = 60-89 mL/min/1 73 square meters)  •  Stage 3A Moderate CKD (GFR = 45-59 mL/min/1 73 square meters)  •  Stage 3B Moderate CKD (GFR = 30-44 mL/min/1 73 square meters)  •  Stage 4 Severe CKD (GFR = 15-29 mL/min/1 73 square meters)  •  Stage 5 End Stage CKD (GFR <15 mL/min/1 73 square meters)  Note: GFR calculation is accurate only with a steady state creatinine    Lipase [[id number]]  (Normal) Collected: 05/03/23 0130    Lab Status: Final result Specimen: Blood from Arm, Left Updated: 05/03/23 0159     Lipase 23 u/L     CBC and differential [[id number]]  (Abnormal) Collected: 05/03/23 0130    Lab Status: Final result Specimen: Blood from Arm, Left Updated: 05/03/23 0139     WBC 15 69 Thousand/uL      RBC 4 55 Million/uL      Hemoglobin 15 2 g/dL      Hematocrit 44 5 %      MCV 98 fL      MCH 33 4 pg      MCHC 34 2 g/dL      RDW 11 9 %      MPV 10 2 fL      Platelets 526 Thousands/uL      nRBC 0 /100 WBCs      Neutrophils Relative 74 %      Immat GRANS % 0 %      Lymphocytes Relative 18 %      Monocytes Relative 5 %      Eosinophils Relative 2 %      Basophils Relative 1 %      Neutrophils Absolute 11 65 Thousands/µL      Immature Grans Absolute 0 06 Thousand/uL      Lymphocytes Absolute 2 75 Thousands/µL      Monocytes Absolute 0 78 Thousand/µL      Eosinophils Absolute 0 36 Thousand/µL      Basophils Absolute 0 09 Thousands/µL                  CT abdomen pelvis with contrast   Final Result by Mariela Kemp MD (05/03 0329)      1  Large subcapsular hematoma compressing the left kidney measuring approximately 13 5 x 15 8 x 9 7 cm and up to 6 2 cm in thickness  Linear hyperdensities within the posterior aspect of the hematoma compatible with active extravasation  There is    surrounding perinephric and retroperitoneal hemorrhage  2   Vague hypodensities are seen within the superior aspect of the spleen measuring up to 4 cm and 1 9 cm respectively  These areas may reflect splenic infarction less likely laceration  There are foci of hyperdensity at the superior aspect of the spleen    which was present on prior exam and likely represent a hemangioma versus calcification  3   Flattening of the IVC compatible with hypovolemia  4   Cholelithiasis without evidence of cholecystitis  5   Stable 2 1 cm cystic lesion within the body of the pancreas  6   Dense opacity in the left lower lobe which may be due to atelectasis versus pneumonia  Small left pleural effusion  I personally discussed the left renal and splenic findings of this study with Lisseth Daigle on 5/3/2023 3:17 AM                   Workstation performed: [id number]         XR chest 1 view portable    (Results Pending)   XR abdomen 1 view kub    (Results Pending)   IR embolization (specify vessel or site)    (Results Pending)         Procedures  Procedures      ED Course                                       Medical Decision Making  Patient is a 72-year-old male presenting to the emergency department with flank pain    Patient was evaluated immediately upon arrival, pain meds were ordered  I went personally spoke with the CT tech and patient was scanned in a timely manner  Trauma surgery was emergently notified of acute contrast extravasation and patient was evaluated  While in the emergency department patient blood pressure began dropping and fluids were given emergently with reversal of anticoagulation  Patient to be admitted to trauma service for further management  Amount and/or Complexity of Data Reviewed  Labs: ordered  Radiology: ordered  Risk  Prescription drug management  Disposition  Final diagnoses:   Kidney capsule rupture, left, initial encounter     Time reflects when diagnosis was documented in both MDM as applicable and the Disposition within this note     Time User Action Codes Description Comment    5/3/2023  3:00 AM Tavo Ward Add [S30 062A] Kidney capsule rupture, left, initial encounter       ED Disposition     None      Follow-up Information    None         Patient's Medications   Discharge Prescriptions    No medications on file     No discharge procedures on file  PDMP Review       Value Time User    PDMP Reviewed  Yes 5/3/2023  3:26 AM Cale Frias, [facility]            ED Provider  Attending physically available and evaluated One Hew II  I managed the patient along with the ED Attending      Electronically Signed by         Adam Gamez DO  05/03/23 Marcello Parker DO  05/03/23 5416

## 2023-05-03 NOTE — CONSULTS
"[facility]  Consult  Name: Raeann Bridges II 72 y o  male I MRN: [id number]  Unit/Bed#: MIKAELA I Date of Admission: 5/3/2023   Date of Service: 5/3/2023 I Hospital Day: 0    Consults    Assessment/Plan   * Kidney capsule rupture, left, initial encounter  Assessment & Plan  · Non-traumatic  · Patient was reportedly in his usual state of health when he awoke earlier this evening with sudden onset left abdominal / flank pain  Review of notes from [facility] -- he does have history of renal cysts/stones in the past  He is on Xarelto, and was given ASA by his wife prior to arrival to hospital   · CT ap: on my review -- left renal capsule rupture with contrast extravasation, fluid surrounding likely RP hemorrhage  Radiology read: \"Large subcapsular hematoma compressing the left kidney measuring 13 5 x 15 8 x 9 7 cm and up to 6 2 cm in thickness  Linear hyperdensities within the posterior aspect of the hematoma compatible with active extravasation  There is surrounding perinephric and retroperitoneal hemorrhage  \"  · Hgb 15 > 12 while in ER  · INR 4 3  · Received Kcentra and DDAVP for reversal  · Received 2 units uncrossmatched PRBC in ER  · IR consulted for embolization / bleeding control  · On arrival to ICU from IR will check INR, CBC, lactic, VBG, CMP, Mag, Phos  · H/h q6h  · Transfuse as needed for hgb < 7, active bleeding, or hemodynamic instability  · Serial abdominal exams  · Place 3 way potter, will need CBI if bloody UO noted  · Monitor UO and renal indices closely  Paroxysmal atrial fibrillation (HCC)  Assessment & Plan  · Home regimen: Xarelto and metoprolol  · Holding Xarelto for now  · Continue telemetry    Splenic hemorrhage  Assessment & Plan  · Noted on CT as above  Per radiology: \"There is vague hypodensity within the superior spleen measuring up to 4 cm  In addition there is a 1 9 cm hypodensity in the superior spleen    There are small foci of hyperdensity within the superior aspect " "of the spleen  There is perisplenic hemorrhage  \"  · IR consulted as above  · Monitor ABD exams   · Transfuse as above    Leukemia [facility])  Assessment & Plan  · S/p chemotherapy -- currently in remission for 14 years    Seizure disorder [facility])  Assessment & Plan  · Last seizure: July 2022 (was off medication at time of seizure due to cost)  · Currently well controlled with Vimpat  · Holding Vimpat for now  Check renal function for dosing after patient returns from IR      CAD (coronary artery disease)  Assessment & Plan  · Hx CABG 2020  · Hold AC/AP for now  · Hold Xarelto  · Continue metoprolol   · Resume home statin    Type 2 diabetes mellitus ([facility] [address] )  Assessment & Plan  Lab Results   Component Value Date    HGBA1C 5 9 (H) 04/20/2023       No results for input(s): POCGLU in the last 72 hours  Blood Sugar Average: Last 72 hrs:     · Holding home glimepiride and metformin for now  · Start accuchecks ACHS with SSI  · Goal blood glucose 140-180  · Avoid hypoglycemia  · Once able to take PO will need CHO controlled diet         History of Present Illness     HPI: Angela Mcclain II is a 72 y o  who presents with PMHx pAfib on Xarelto, seizure disorder maintained on Vimpat, DM2, HTN, CAD, Leukemia s/p chemotherapy in remission x14 years  He reports that this evening he was awoken by sudden onset of sharp pain in his right abd/flank  Denies falls, traumatic events, recent procedures, or illness  CT revealed left renal capsule rupture with active extravasation and small perisplenic hemorrhage  He received KCentra and DDAVP in the ER for AC/AP reversal   Hemoglobin decreased from 15 to 12 over 1 5 hours in ER  Received 2 units uncrossmatched emergent PRBC  IR was consulted and patient will be admitted to the ICU under Surgical Critical Care following procedure in IR  History obtained from spouse and child, chart review and the patient  Review of Systems   Constitutional: Negative for chills, fatigue and fever     HENT: " Negative  Eyes: Negative  Respiratory: Negative for cough, chest tightness, shortness of breath and wheezing  Cardiovascular: Negative for chest pain, palpitations and leg swelling  Gastrointestinal: Positive for abdominal pain (left flank)  Negative for abdominal distention, blood in stool, diarrhea, nausea and vomiting  Endocrine: Negative  Genitourinary: Positive for flank pain  Negative for difficulty urinating, frequency, hematuria and urgency  Musculoskeletal: Negative for arthralgias and myalgias  Skin: Negative for pallor, rash and wound  Allergic/Immunologic: Negative  Neurological: Negative for dizziness, syncope, weakness and light-headedness  Hematological: Negative  Psychiatric/Behavioral: Negative for agitation, confusion and hallucinations  The patient is not nervous/anxious  Historical Information   Past Medical History:  No date: CAD (coronary artery disease)  No date: DM (diabetes mellitus) ([place] [address] )  No date: Leukemia ([place] [address] )  No date: Seizure disorder ([place] [address] ) Past Surgical History:  No date: CORONARY ARTERY BYPASS GRAFT   Current Outpatient Medications   Medication Instructions   • ezetimibe-simvastatin (VYTORIN) 10-40 mg per tablet 1 tablet, Oral, Daily at bedtime   • glimepiride (AMARYL) 2 mg, Oral, Every morning before breakfast   • lacosamide (VIMPAT) 100 mg, Oral, Every 12 hours scheduled   • metFORMIN (GLUCOPHAGE-XR) 500 mg 24 hr tablet Oral, Daily with dinner   • metoprolol tartrate (LOPRESSOR) 25 mg, Oral, Every 12 hours scheduled   • rivaroxaban (XARELTO) 20 mg, Oral, Daily    Allergies   Allergen Reactions   • Phenytoin Rash     Red  Jason up arm to head          No family history on file       Objective                            Vitals I/O      Most Recent Min/Max in 24hrs   Temp 97 5 °F (36 4 °C) Temp  Min: 97 5 °F (36 4 °C)  Max: 97 6 °F (36 4 °C)   Pulse 97 Pulse  Min: 52  Max: 97   Resp 20 Resp  Min: 18  Max: 26   /71 BP  Min: 116/71  Max: 209/125   O2 Sat 99 % SpO2  Min: 96 %  Max: 100 %      Intake/Output Summary (Last 24 hours) at 5/3/2023 0534  Last data filed at 5/3/2023 0455  Gross per 24 hour   Intake 533 33 ml   Output --   Net 533 33 ml         Diet NPO; Sips with meds     Invasive Monitoring Physical exam    Physical Exam  Vitals and nursing note reviewed  Constitutional:       General: He is in acute distress  Appearance: He is well-developed and well-groomed  He is ill-appearing  He is not toxic-appearing or diaphoretic  Interventions: Nasal cannula in place  HENT:      Head: Normocephalic and atraumatic  Right Ear: External ear normal       Left Ear: External ear normal       Nose: Nose normal  No congestion or rhinorrhea  Mouth/Throat:      Mouth: Mucous membranes are moist       Pharynx: Oropharynx is clear  No oropharyngeal exudate or posterior oropharyngeal erythema  Eyes:      General: No scleral icterus  Extraocular Movements: Extraocular movements intact  Conjunctiva/sclera: Conjunctivae normal       Pupils: Pupils are equal, round, and reactive to light  Neck:      Vascular: No carotid bruit  Cardiovascular:      Rate and Rhythm: Normal rate and regular rhythm  Pulses: Normal pulses  Radial pulses are 2+ on the right side and 2+ on the left side  Dorsalis pedis pulses are 2+ on the right side and 2+ on the left side  Heart sounds: S1 normal and S2 normal  Murmur heard  No friction rub  No gallop  Pulmonary:      Effort: Pulmonary effort is normal  No respiratory distress  Breath sounds: Normal breath sounds  No wheezing, rhonchi or rales  Abdominal:      General: Bowel sounds are normal  There is distension  Palpations: Abdomen is soft  Tenderness: There is abdominal tenderness  There is left CVA tenderness  Musculoskeletal:         General: No swelling or tenderness  Normal range of motion        Cervical back: Normal range of motion and neck supple  Right lower leg: No edema  Left lower leg: No edema  Lymphadenopathy:      Cervical: No cervical adenopathy  Skin:     General: Skin is warm and dry  Capillary Refill: Capillary refill takes less than 2 seconds  Coloration: Skin is pale  Findings: No bruising, erythema or rash  Neurological:      General: No focal deficit present  Mental Status: He is alert and oriented to person, place, and time  GCS: GCS eye subscore is 4  GCS verbal subscore is 5  GCS motor subscore is 6  Cranial Nerves: Cranial nerves 2-12 are intact  No cranial nerve deficit, dysarthria or facial asymmetry  Sensory: Sensation is intact  No sensory deficit  Motor: Motor function is intact  Psychiatric:         Mood and Affect: Mood normal          Behavior: Behavior normal  Behavior is cooperative  Thought Content: Thought content normal          Judgment: Judgment normal               Diagnostic Studies      EKG: sinus rhythm  Imagin/3 CT ap: There is perisplenic hemorrhage  There is a large subcapsular hematoma compressing the left kidney measuring approximately 13 5 x 15 8 x 9 7 cm in size and up to 6 2 cm in thickness There is irregular complex contour of the kidney  There is a 3 mm left renal calculus  There are linear hyperdensities within the posterior aspect of the hematoma compatible with active extravasation   I have personally reviewed pertinent reports     and I have personally reviewed pertinent films in PACS     Medications:  Scheduled PRN   acetaminophen, 975 mg, Q8H [address]  cefepime, 2,000 mg, Once  docusate sodium, 100 mg, BID  HYDROmorphone, 0 5 mg, Once  insulin lispro, 1-6 Units, TID AC  metoprolol tartrate, 25 mg, Q12H JAD  metroNIDAZOLE, 500 mg, Once  potassium chloride, 20 mEq, Q2H      HYDROmorphone, 0 2 mg, Q4H PRN  lidocaine (PF), , PRN  ondansetron, 4 mg, Q6H PRN  oxyCODONE, 5 mg, Q4H PRN  oxyCODONE, 2 5 mg, Q4H PRN       Continuous multi-electrolyte, 125 mL/hr, Last Rate: 125 mL/hr (05/03/23 0335)         Labs:    CBC    Recent Labs     05/03/23 0130 05/03/23  0310   WBC 15 69*  --    HGB 15 2 12 1   HCT 44 5  --      --      BMP    Recent Labs     05/03/23 0130   SODIUM 141   K 3 1*      CO2 28   AGAP 10   BUN 16   CREATININE 1 18   CALCIUM 8 5       Coags    Recent Labs     05/03/23  0310   INR 4 33*   PTT 41*        Additional Electrolytes  No recent results       Blood Gas    No recent results  No recent results LFTs  Recent Labs     05/03/23  0130   ALT 14   AST 15   ALKPHOS 53   ALB 3 9   TBILI 0 50       Infectious  No recent results  Glucose  Recent Labs     05/03/23  0130   GLUC 265*             Critical Care Time Delivered: Upon my evaluation, this patient had a high probability of imminent or life-threatening deterioration due to left renal capsule rupture, which required my direct attention, intervention, and personal management  I have personally provided 45 minutes of critical care time, exclusive of procedures, teaching, family meetings, and any prior time recorded by providers other than myself     Angelito Lopez

## 2023-05-03 NOTE — ASSESSMENT & PLAN NOTE
"· Non-traumatic  · Patient was reportedly in his usual state of health when he awoke earlier this evening with sudden onset left abdominal / flank pain  Review of notes from [facility] -- he does have history of renal cysts/stones in the past  He is on Xarelto, and was given ASA by his wife prior to arrival to hospital   · CT ap: on my review -- left renal capsule rupture with contrast extravasation, fluid surrounding likely RP hemorrhage  Radiology read: \"Large subcapsular hematoma compressing the left kidney measuring 13 5 x 15 8 x 9 7 cm and up to 6 2 cm in thickness  Linear hyperdensities within the posterior aspect of the hematoma compatible with active extravasation  There is surrounding perinephric and retroperitoneal hemorrhage  \"  · Hgb 15 > 12 while in ER  · INR 4 3  · Received Kcentra and DDAVP for reversal  · Received 2 units uncrossmatched PRBC in ER  · IR consulted for embolization / bleeding control  · On arrival to ICU from IR will check INR, CBC, lactic, VBG, CMP, Mag, Phos  · H/h q6h  · Transfuse as needed for hgb < 7, active bleeding, or hemodynamic instability  · Serial abdominal exams  · Place 3 way potter, will need CBI if bloody UO noted  · Monitor UO and renal indices closely    "

## 2023-05-03 NOTE — OCCUPATIONAL THERAPY NOTE
Occupational Therapy Cancellation Note         Patient Name: Peg Dorsey Date: 5/3/2023       05/03/23 1003   Note Type   Note type Cancelled Session   Cancel Reasons Medical status   Additional Comments OT orders received, chart review completed  Spoke to [facility] who reports that pt was on bedrest this AM, however continues to have low BP and blood at incision/access site   Will hold OT eval at this time and see as pt is more medically appropriate/able, will continue to follow     Cony Jensen MS, OTR/L

## 2023-05-03 NOTE — ANESTHESIA PREPROCEDURE EVALUATION
Procedure:  IR EMBOLIZATION (SPECIFY VESSEL OR SITE)    Relevant Problems   CARDIO   (+) CAD (coronary artery disease)      ENDO   (+) Type 2 diabetes mellitus (HCC)      /RENAL   (+) Kidney capsule rupture, left, initial encounter      NEURO/PSYCH   (+) Seizure disorder ([facility] [address] )      Suspected early onset parkinson's    Spontaneous rupture of kidney  On anticoagulation (xarelto)  Was Hypertensive to 's  INR 4 3 Hgb dropped from 15 2 --> 12 1    Received Kcentra (2000 units) @0315, DDAVP @0321    Being transfused  NPO @ 8pm    Physical Exam    Airway    Mallampati score: III  TM Distance: >3 FB  Neck ROM: full     Dental   No notable dental hx     Cardiovascular      Pulmonary      Other Findings        Anesthesia Plan  ASA Score- 3 Emergent    Anesthesia Type- IV sedation with anesthesia with ASA Monitors  Additional Monitors:   Airway Plan:     Comment: Plan for MAC, GA back up  Plan Factors-    Chart reviewed  Existing labs reviewed  Patient summary reviewed  Induction- intravenous  Postoperative Plan-     Informed Consent- Anesthetic plan and risks discussed with patient  I personally reviewed this patient with the CRNA  Discussed and agreed on the Anesthesia Plan with the CRNA  Amparo Phillips

## 2023-05-03 NOTE — BRIEF OP NOTE (RAD/CATH)
INTERVENTIONAL RADIOLOGY PROCEDURE NOTE    Date: 5/3/2023    Procedure: Left renal artery embolization    Preoperative diagnosis:   1  Renal hemorrhage, left    2  Kidney capsule rupture, left, initial encounter    3  Hemorrhagic shock ([address] )    4  CAD (coronary artery disease)         Postoperative diagnosis: Same  Surgeon: Gerardine Nageotte, MD     Assistant: None  No qualified resident was available  Blood loss: 20 mL    Specimens: None     Findings: Active extravasation of contrast identified at the mid left kidney  Given patient's coagulopathy, the proximal and distal branches of the left renal artery was embolized using gelfoam and coils in alternating fashion  Completion left renal arteriogram showed no further perfusion of the left kidney  Completion aortogram and selective angiogram of the left L4 lumbar artery showed no evidence of active extravasation of contrast     The right CFA access site was closed using a perclose device  Complications: None immediate      Anesthesia: general

## 2023-05-04 ENCOUNTER — APPOINTMENT (INPATIENT)
Dept: RADIOLOGY | Facility: HOSPITAL | Age: 73
End: 2023-05-04

## 2023-05-04 ENCOUNTER — APPOINTMENT (INPATIENT)
Dept: NON INVASIVE DIAGNOSTICS | Facility: HOSPITAL | Age: 73
End: 2023-05-04

## 2023-05-04 PROBLEM — N17.9 AKI (ACUTE KIDNEY INJURY) (HCC): Status: ACTIVE | Noted: 2023-05-04

## 2023-05-04 PROBLEM — R79.89 ELEVATED TROPONIN: Status: ACTIVE | Noted: 2023-05-04

## 2023-05-04 PROBLEM — R57.8 HEMORRHAGIC SHOCK (HCC): Status: ACTIVE | Noted: 2023-05-04

## 2023-05-04 PROBLEM — R77.8 ELEVATED TROPONIN: Status: ACTIVE | Noted: 2023-05-04

## 2023-05-04 PROBLEM — D68.9 COAGULOPATHY (HCC): Status: ACTIVE | Noted: 2023-05-04

## 2023-05-04 PROBLEM — R06.89 ACUTE RESPIRATORY INSUFFICIENCY: Status: ACTIVE | Noted: 2023-05-04

## 2023-05-04 LAB
2HR DELTA HS TROPONIN: 831 NG/L
4HR DELTA HS TROPONIN: 1166 NG/L
ABO GROUP BLD BPU: NORMAL
ANION GAP SERPL CALCULATED.3IONS-SCNC: 10 MMOL/L (ref 4–13)
ANION GAP SERPL CALCULATED.3IONS-SCNC: 9 MMOL/L (ref 4–13)
AORTIC ROOT: 4.8 CM
APICAL FOUR CHAMBER EJECTION FRACTION: 64 %
ASCENDING AORTA: 4 CM
ATRIAL RATE: 101 BPM
ATRIAL RATE: 80 BPM
AV REGURGITATION PRESSURE HALF TIME: 394 MS
BASE EXCESS BLDA CALC-SCNC: -3.7 MMOL/L
BPU ID: NORMAL
BUN SERPL-MCNC: 31 MG/DL (ref 5–25)
BUN SERPL-MCNC: 33 MG/DL (ref 5–25)
CA-I BLD-SCNC: 1.07 MMOL/L (ref 1.12–1.32)
CALCIUM SERPL-MCNC: 8.3 MG/DL (ref 8.4–10.2)
CALCIUM SERPL-MCNC: 8.4 MG/DL (ref 8.4–10.2)
CARDIAC TROPONIN I PNL SERPL HS: 1981 NG/L
CARDIAC TROPONIN I PNL SERPL HS: 2316 NG/L
CARDIAC TROPONIN I PNL SERPL HS: 2989 NG/L (ref 8–18)
CARDIAC TROPONIN I PNL SERPL HS: 3266 NG/L (ref 8–18)
CHLORIDE SERPL-SCNC: 108 MMOL/L (ref 96–108)
CHLORIDE SERPL-SCNC: 109 MMOL/L (ref 96–108)
CO2 SERPL-SCNC: 19 MMOL/L (ref 21–32)
CO2 SERPL-SCNC: 20 MMOL/L (ref 21–32)
CREAT SERPL-MCNC: 2.4 MG/DL (ref 0.6–1.3)
CREAT SERPL-MCNC: 2.42 MG/DL (ref 0.6–1.3)
CROSSMATCH: NORMAL
CROSSMATCH: NORMAL
DOP CALC LVOT AREA: 4.91 CM2
DOP CALC LVOT DIAMETER: 2.5 CM
E WAVE DECELERATION TIME: 204 MS
ERYTHROCYTE [DISTWIDTH] IN BLOOD BY AUTOMATED COUNT: 16.1 % (ref 11.6–15.1)
ERYTHROCYTE [DISTWIDTH] IN BLOOD BY AUTOMATED COUNT: 16.9 % (ref 11.6–15.1)
FRACTIONAL SHORTENING: 6 (ref 28–44)
GFR SERPL CREATININE-BSD FRML MDRD: 25 ML/MIN/1.73SQ M
GFR SERPL CREATININE-BSD FRML MDRD: 26 ML/MIN/1.73SQ M
GLUCOSE SERPL-MCNC: 138 MG/DL (ref 65–140)
GLUCOSE SERPL-MCNC: 141 MG/DL (ref 65–140)
GLUCOSE SERPL-MCNC: 144 MG/DL (ref 65–140)
GLUCOSE SERPL-MCNC: 146 MG/DL (ref 65–140)
GLUCOSE SERPL-MCNC: 153 MG/DL (ref 65–140)
GLUCOSE SERPL-MCNC: 157 MG/DL (ref 65–140)
GLUCOSE SERPL-MCNC: 165 MG/DL (ref 65–140)
HCO3 BLDA-SCNC: 19.7 MMOL/L (ref 22–28)
HCT VFR BLD AUTO: 27.6 % (ref 36.5–49.3)
HCT VFR BLD AUTO: 28.5 % (ref 36.5–49.3)
HCT VFR BLD AUTO: 28.6 % (ref 36.5–49.3)
HCT VFR BLD AUTO: 29.5 % (ref 36.5–49.3)
HGB BLD-MCNC: 10.1 G/DL (ref 12–17)
HGB BLD-MCNC: 10.2 G/DL (ref 12–17)
HGB BLD-MCNC: 10.4 G/DL (ref 12–17)
HGB BLD-MCNC: 9.6 G/DL (ref 12–17)
HOROWITZ INDEX BLDA+IHG-RTO: 40 MM[HG]
INR PPP: 1.67 (ref 0.84–1.19)
INTERVENTRICULAR SEPTUM IN DIASTOLE (PARASTERNAL SHORT AXIS VIEW): 1.9 CM
INTERVENTRICULAR SEPTUM: 1.9 CM (ref 0.6–1.1)
LAAS-AP2: 25.3 CM2
LAAS-AP4: 29.4 CM2
LACTATE SERPL-SCNC: 2 MMOL/L (ref 0.5–2)
LEFT INTERNAL DIMENSION IN SYSTOLE: 3.2 CM (ref 2.1–4)
LEFT VENTRICULAR INTERNAL DIMENSION IN DIASTOLE: 3.4 CM (ref 3.5–6)
LEFT VENTRICULAR POSTERIOR WALL IN END DIASTOLE: 1.4 CM
LEFT VENTRICULAR STROKE VOLUME: 7 ML
LVSV (TEICH): 7 ML
MAGNESIUM SERPL-MCNC: 2.2 MG/DL (ref 1.9–2.7)
MAGNESIUM SERPL-MCNC: 2.2 MG/DL (ref 1.9–2.7)
MCH RBC QN AUTO: 31.3 PG (ref 26.8–34.3)
MCH RBC QN AUTO: 31.7 PG (ref 26.8–34.3)
MCHC RBC AUTO-ENTMCNC: 35.3 G/DL (ref 31.4–37.4)
MCHC RBC AUTO-ENTMCNC: 35.8 G/DL (ref 31.4–37.4)
MCV RBC AUTO: 89 FL (ref 82–98)
MCV RBC AUTO: 89 FL (ref 82–98)
MV E'TISSUE VEL-SEP: 6 CM/S
MV PEAK A VEL: 1.01 M/S
MV PEAK E VEL: 76 CM/S
MV STENOSIS PRESSURE HALF TIME: 59 MS
MV VALVE AREA P 1/2 METHOD: 3.73
O2 CT BLDA-SCNC: 14.6 ML/DL (ref 16–23)
OXYHGB MFR BLDA: 95.9 % (ref 94–97)
P AXIS: 33 DEGREES
P AXIS: 53 DEGREES
PCO2 BLDA: 30.1 MM HG (ref 36–44)
PEEP RESPIRATORY: 8 CM[H2O]
PH BLDA: 7.43 [PH] (ref 7.35–7.45)
PHOSPHATE SERPL-MCNC: 3.7 MG/DL (ref 2.3–4.1)
PHOSPHATE SERPL-MCNC: 3.9 MG/DL (ref 2.3–4.1)
PLATELET # BLD AUTO: 120 THOUSANDS/UL (ref 149–390)
PLATELET # BLD AUTO: 129 THOUSANDS/UL (ref 149–390)
PMV BLD AUTO: 10.6 FL (ref 8.9–12.7)
PMV BLD AUTO: 11.1 FL (ref 8.9–12.7)
PO2 BLDA: 96.4 MM HG (ref 75–129)
POTASSIUM SERPL-SCNC: 4.5 MMOL/L (ref 3.5–5.3)
POTASSIUM SERPL-SCNC: 4.6 MMOL/L (ref 3.5–5.3)
PR INTERVAL: 166 MS
PR INTERVAL: 170 MS
PROTHROMBIN TIME: 19.9 SECONDS (ref 11.6–14.5)
QRS AXIS: 69 DEGREES
QRS AXIS: 72 DEGREES
QRSD INTERVAL: 100 MS
QRSD INTERVAL: 100 MS
QT INTERVAL: 358 MS
QT INTERVAL: 378 MS
QTC INTERVAL: 435 MS
QTC INTERVAL: 464 MS
RBC # BLD AUTO: 3.22 MILLION/UL (ref 3.88–5.62)
RBC # BLD AUTO: 3.23 MILLION/UL (ref 3.88–5.62)
RIGHT ATRIUM AREA SYSTOLE A4C: 18.2 CM2
RIGHT VENTRICLE ID DIMENSION: 4.9 CM
SINOTUBULAR JUNCTION: 3.7 CM
SL CV AV DECELERATION TIME RETROGRADE: 1358 MS
SL CV AV PEAK GRADIENT RETROGRADE: 62 MMHG
SL CV LEFT ATRIUM LENGTH A2C: 5.5 CM
SL CV LV EF: 60
SL CV PED ECHO LEFT VENTRICLE DIASTOLIC VOLUME (MOD BIPLANE) 2D: 49 ML
SL CV PED ECHO LEFT VENTRICLE SYSTOLIC VOLUME (MOD BIPLANE) 2D: 42 ML
SL CV SINUS OF VALSALVA 2D: 4.8 CM
SODIUM SERPL-SCNC: 137 MMOL/L (ref 135–147)
SODIUM SERPL-SCNC: 138 MMOL/L (ref 135–147)
SPECIMEN SOURCE: ABNORMAL
STJ: 3.7 CM
T WAVE AXIS: -17 DEGREES
T WAVE AXIS: 235 DEGREES
TR MAX PG: 30 MMHG
TR PEAK VELOCITY: 2.7 M/S
TRICUSPID ANNULAR PLANE SYSTOLIC EXCURSION: 2 CM
TRICUSPID VALVE PEAK REGURGITATION VELOCITY: 2.74 M/S
UNIT DISPENSE STATUS: NORMAL
UNIT PRODUCT CODE: NORMAL
UNIT PRODUCT VOLUME: 280 ML
UNIT PRODUCT VOLUME: 280 ML
UNIT PRODUCT VOLUME: 350 ML
UNIT PRODUCT VOLUME: 350 ML
UNIT RH: NORMAL
VENT AC: 14
VENT- AC: AC
VENTRICULAR RATE: 101 BPM
VENTRICULAR RATE: 80 BPM
VT SETTING VENT: 450 ML
WBC # BLD AUTO: 19.02 THOUSAND/UL (ref 4.31–10.16)
WBC # BLD AUTO: 19.26 THOUSAND/UL (ref 4.31–10.16)

## 2023-05-04 RX ORDER — LIDOCAINE HYDROCHLORIDE 20 MG/ML
1 JELLY TOPICAL ONCE
Status: COMPLETED | OUTPATIENT
Start: 2023-05-04 | End: 2023-05-04

## 2023-05-04 RX ORDER — FUROSEMIDE 10 MG/ML
20 INJECTION INTRAMUSCULAR; INTRAVENOUS ONCE
Status: COMPLETED | OUTPATIENT
Start: 2023-05-04 | End: 2023-05-04

## 2023-05-04 RX ORDER — PRAVASTATIN SODIUM 80 MG/1
80 TABLET ORAL
Status: DISCONTINUED | OUTPATIENT
Start: 2023-05-04 | End: 2023-05-15 | Stop reason: HOSPADM

## 2023-05-04 RX ORDER — SENNOSIDES 8.6 MG
1 TABLET ORAL
Status: DISCONTINUED | OUTPATIENT
Start: 2023-05-04 | End: 2023-05-15 | Stop reason: HOSPADM

## 2023-05-04 RX ORDER — OXYCODONE HYDROCHLORIDE 5 MG/1
5 TABLET ORAL EVERY 4 HOURS PRN
Status: DISCONTINUED | OUTPATIENT
Start: 2023-05-04 | End: 2023-05-15 | Stop reason: HOSPADM

## 2023-05-04 RX ORDER — LABETALOL HYDROCHLORIDE 5 MG/ML
20 INJECTION, SOLUTION INTRAVENOUS EVERY 6 HOURS PRN
Status: DISCONTINUED | OUTPATIENT
Start: 2023-05-04 | End: 2023-05-15 | Stop reason: HOSPADM

## 2023-05-04 RX ORDER — HYDROMORPHONE HCL IN WATER/PF 6 MG/30 ML
0.2 PATIENT CONTROLLED ANALGESIA SYRINGE INTRAVENOUS EVERY 4 HOURS PRN
Status: DISCONTINUED | OUTPATIENT
Start: 2023-05-04 | End: 2023-05-11

## 2023-05-04 RX ORDER — EZETIMIBE 10 MG/1
10 TABLET ORAL
Status: DISCONTINUED | OUTPATIENT
Start: 2023-05-04 | End: 2023-05-15 | Stop reason: HOSPADM

## 2023-05-04 RX ADMIN — PROPOFOL 25 MCG/KG/MIN: 10 INJECTION, EMULSION INTRAVENOUS at 03:32

## 2023-05-04 RX ADMIN — LIDOCAINE 5% 1 PATCH: 700 PATCH TOPICAL at 08:31

## 2023-05-04 RX ADMIN — LABETALOL HYDROCHLORIDE 20 MG: 5 INJECTION, SOLUTION INTRAVENOUS at 17:43

## 2023-05-04 RX ADMIN — ACETAMINOPHEN 975 MG: 325 TABLET ORAL at 14:11

## 2023-05-04 RX ADMIN — PANTOPRAZOLE SODIUM 40 MG: 40 INJECTION, POWDER, FOR SOLUTION INTRAVENOUS at 08:31

## 2023-05-04 RX ADMIN — LIDOCAINE HYDROCHLORIDE 1 APPLICATION.: 20 JELLY TOPICAL at 17:44

## 2023-05-04 RX ADMIN — CEFEPIME 2000 MG: 2 INJECTION, POWDER, FOR SOLUTION INTRAVENOUS at 08:56

## 2023-05-04 RX ADMIN — ACETAMINOPHEN 975 MG: 325 TABLET ORAL at 05:20

## 2023-05-04 RX ADMIN — FUROSEMIDE 20 MG: 10 INJECTION, SOLUTION INTRAMUSCULAR; INTRAVENOUS at 21:39

## 2023-05-04 RX ADMIN — SODIUM CHLORIDE, SODIUM GLUCONATE, SODIUM ACETATE, POTASSIUM CHLORIDE, MAGNESIUM CHLORIDE, SODIUM PHOSPHATE, DIBASIC, AND POTASSIUM PHOSPHATE 100 ML/HR: .53; .5; .37; .037; .03; .012; .00082 INJECTION, SOLUTION INTRAVENOUS at 17:18

## 2023-05-04 RX ADMIN — LACOSAMIDE 100 MG: 100 TABLET, FILM COATED ORAL at 08:31

## 2023-05-04 RX ADMIN — METHOCARBAMOL TABLETS 500 MG: 500 TABLET, COATED ORAL at 05:20

## 2023-05-04 RX ADMIN — DOCUSATE SODIUM 100 MG: 100 CAPSULE, LIQUID FILLED ORAL at 17:43

## 2023-05-04 RX ADMIN — METHOCARBAMOL TABLETS 500 MG: 500 TABLET, COATED ORAL at 14:11

## 2023-05-04 RX ADMIN — ACETAMINOPHEN 975 MG: 325 TABLET ORAL at 21:39

## 2023-05-04 RX ADMIN — PANTOPRAZOLE SODIUM 40 MG: 40 INJECTION, POWDER, FOR SOLUTION INTRAVENOUS at 21:39

## 2023-05-04 RX ADMIN — METHOCARBAMOL TABLETS 500 MG: 500 TABLET, COATED ORAL at 21:39

## 2023-05-04 RX ADMIN — METOPROLOL TARTRATE 25 MG: 25 TABLET, FILM COATED ORAL at 21:39

## 2023-05-04 RX ADMIN — SENNOSIDES 8.6 MG: 8.6 TABLET, FILM COATED ORAL at 21:38

## 2023-05-04 RX ADMIN — CHLORHEXIDINE GLUCONATE 0.12% ORAL RINSE 15 ML: 1.2 LIQUID ORAL at 08:31

## 2023-05-04 RX ADMIN — METRONIDAZOLE 500 MG: 500 INJECTION, SOLUTION INTRAVENOUS at 07:45

## 2023-05-04 RX ADMIN — METRONIDAZOLE 500 MG: 500 INJECTION, SOLUTION INTRAVENOUS at 00:01

## 2023-05-04 RX ADMIN — INSULIN LISPRO 1 UNITS: 100 INJECTION, SOLUTION INTRAVENOUS; SUBCUTANEOUS at 00:03

## 2023-05-04 RX ADMIN — PROPOFOL 10 MCG/KG/MIN: 10 INJECTION, EMULSION INTRAVENOUS at 08:32

## 2023-05-04 RX ADMIN — LACOSAMIDE 100 MG: 100 TABLET, FILM COATED ORAL at 21:39

## 2023-05-04 RX ADMIN — PRAVASTATIN SODIUM 80 MG: 80 TABLET ORAL at 16:21

## 2023-05-04 RX ADMIN — EZETIMIBE 10 MG: 10 TABLET ORAL at 16:21

## 2023-05-04 RX ADMIN — METOPROLOL TARTRATE 25 MG: 25 TABLET, FILM COATED ORAL at 08:32

## 2023-05-04 NOTE — ASSESSMENT & PLAN NOTE
"· Non-traumatic  · Patient was reportedly in his usual state of health when he awoke earlier this evening with sudden onset left abdominal / flank pain  Review of notes from [facility] -- he does have history of renal cysts/stones in the past  He is on Xarelto, and was given ASA by his wife prior to arrival to hospital   · CT ap: on my review -- left renal capsule rupture with contrast extravasation, fluid surrounding likely RP hemorrhage  Radiology read: \"Large subcapsular hematoma compressing the left kidney measuring 13 5 x 15 8 x 9 7 cm and up to 6 2 cm in thickness  Linear hyperdensities within the posterior aspect of the hematoma compatible with active extravasation  There is surrounding perinephric and retroperitoneal hemorrhage  \"  · Hgb 15 > 12 while in ER  · INR 4 3 > 1 67  · Received Kcentra and DDAVP for reversal  · Resuscitated with blood products as below  · IR consulted for embolization / bleeding control  · Lower pole initially embolized and patient redeveloped shock  Returned to IR for repeat embolization of lower pole  · Now with stable hemodynamics and h/h  · If re-bleeds will likely need emergent nephrectomy for bleeding control  · Serial abdominal exams  · Continue 3 way potter, will need CBI if bloody UO noted  · Monitor UO and renal indices closely    "

## 2023-05-04 NOTE — CONSULTS
Consult - Urology   Eamon Ferrari II 1950, 72 y o  male MRN: [id number]    Unit/Bed#: ICU 12 Encounter: [id number]      Assessment & Plan:  1  Kidney capsule rupture, left  - Patient with abrupt onset left flank pain, nontraumatic, on Xarelto presenting to the ED 5/03   - CT on admission showing large subcapsular hematoma compressing the left kidney measuring 13 5 x 15 8 x 9 7 cm and up to 6 2 cm in thickness  Surrounding perinephric and retroperitoneal hemorrhage   - Received Kcentra on DDAVP for reversal   - Status post IR embolization  After lower pole embolized, patient redeveloped shock  Returned to IR for complete L artery embolization  - Since IR second embolization patient has remained hemodynamically stable, extubated today  - Hemoglobin stable at 10 4  Continue with serial monitoring   - Left kidney will atrophy over time due to complete embolization  Continue with pain control    - Huggins catheter in place draining clear yellow urine  Monitor for hematuria  Once patient medically stable, can DC catheter per primary team   Patient with discomfort with Huggins catheter, Uro-Jet around catheter site for discomfort  - Creatinine 2 4-hemorrhagic shock, contrast exposure, left kidney capsule rupture  baseline creatinine 0 9  Patient will have new baseline  Avoid hypotension, nephrotoxins   - Etiopathic cause of large kidney capsule rupture  Differential includes AC,trauma, renal tumor, vascular malformations, ruptured hemorrhagic cysts  - Patient will need MRI for further workup for malignancy in 4-6 weeks  No utility in repeating while inpatient due to bleed obstructing view  Wife reports patient has established urologist with [facility], may follow up with patient  - Continue management with primary team  - Urology will sign off at this time  Please do not hesitate to reach out with any questions or concerns       Subjective:   Alin Martinez is a 73 yo male seizure disorder, afib on xarelto, DM 2, CAD, "leukemia in remission, who presents with left sided abdominal pain on   Patient and wife report pain was sudden, severe, upper Left abdomen, flank pain  EMS was called and patient brought to ED  In the ED CT scan was obtained showing large subcapsular hematoma compressing the left kidney measuring 13 5 x 15 8 x 9 7 cm and up to 6 2 cm in thickness, with surrounding perinephric and retroperitoneal hemorrhage  Patient went emergently with IR for lower pole embolization  After first embolization patient developed hemorrhagic shock  Patient underwent complete left renal artery embolization with IR  Patient reports no inciting trauma  He denies any smoking history  Grossly negative urologic history  He reports 1 episode of hematuria associated with a kidney stone  Denies another other episodes of hematuria  He required ureteroscopy for kidney stone in the past   Wife reports patient's mother and father  of cancer, mother of pancreatic cancer, father uncertain of diagnosis  Patient's brother with history of bladder cancer  Urology was consulted due to L kidney capsule rupture  Review of Systems   Constitutional: Negative for chills and fever  Respiratory: Negative for cough and shortness of breath  Cardiovascular: Negative for chest pain and palpitations  Gastrointestinal: Negative for abdominal pain and vomiting  Genitourinary: Positive for dysuria  Negative for flank pain and hematuria  Musculoskeletal: Negative for arthralgias and back pain  Skin: Negative for color change and rash  Neurological: Negative for seizures and syncope  All other systems reviewed and are negative  Objective:  Vitals: Blood pressure 157/83, pulse 97, temperature 100 1 °F (37 8 °C), temperature source Axillary, resp  rate (!) 29, height 5' 10\" (1 778 m), weight 125 kg (275 lb), SpO2 98 %  ,Body mass index is 39 46 kg/m²  Physical Exam  Constitutional:       General: He is not in acute distress       " "Appearance: Normal appearance  He is normal weight  He is ill-appearing  He is not toxic-appearing  HENT:      Head: Normocephalic and atraumatic  Right Ear: External ear normal       Left Ear: External ear normal       Nose: Nose normal       Mouth/Throat:      Mouth: Mucous membranes are moist    Eyes:      General: No scleral icterus  Conjunctiva/sclera: Conjunctivae normal    Cardiovascular:      Rate and Rhythm: Normal rate  Pulses: Normal pulses  Pulmonary:      Effort: Pulmonary effort is normal    Abdominal:      General: Abdomen is flat  There is distension  Palpations: Abdomen is soft  Tenderness: There is no abdominal tenderness  There is no right CVA tenderness, left CVA tenderness or guarding  Musculoskeletal:         General: Normal range of motion  Cervical back: Normal range of motion  Skin:     General: Skin is warm  Findings: No rash  Neurological:      General: No focal deficit present  Mental Status: He is alert and oriented to person, place, and time  Mental status is at baseline  Psychiatric:         Mood and Affect: Mood normal          Behavior: Behavior normal          Thought Content: Thought content normal          Judgment: Judgment normal          Imaging:  CT PULMONARY ANGIOGRAM OF THE CHEST AND CT ABDOMEN AND PELVIS WITH INTRAVENOUS CONTRAST     INDICATION:   R/o PE      Excerpt from interventional radiology note from earlier today \"78 year-old male with spontaneous left renal bleed, s/p embolization this morning, now with continued bleeding with hypotension and requiring blood products  \"  \"We will plan for repeat left   renal angiogram with possible embolization of the left kidney  \"     COMPARISON: Pre-embolization CT abdomen/pelvis from earlier today      TECHNIQUE:  CT examination of the chest, abdomen and pelvis was performed    Thin section CT angiographic technique was used in the chest in order to evaluate for pulmonary embolus " and coronal 3D MIP postprocessing was performed on the acquisition   scanner  Multiplanar 2D reformatted images were created from the source data      Radiation dose length product (DLP) for this visit:  2165 mGy-cm   This examination, like all CT scans performed in the [facility], was performed utilizing techniques to minimize radiation dose exposure, including the use of iterative   reconstruction and automated exposure control      IV Contrast:  100 mL of iohexol (OMNIPAQUE)  Enteric Contrast:  Enteric contrast was not administered      FINDINGS:     CHEST     PULMONARY ARTERIAL TREE:  No pulmonary embolus is seen      LUNGS: Mild left basilar atelectasis      PLEURA: Small left and trace right pleural effusions      HEART/AORTA: Coronary artery calcifications  Ascending aortic ectasia measuring 44 mm  No pericardial effusion      MEDIASTINUM AND ANITRA: Diffuse esophageal distention and moderate circumferential thickening of the distal esophagus #2/147 keeping with a nonspecific esophagitis      CHEST WALL AND LOWER NECK:  Unremarkable      ABDOMEN     LIVER/BILIARY TREE:  Unremarkable      GALLBLADDER: There are gallstone(s) within the gallbladder, without pericholecystic inflammatory changes      SPLEEN:  Unremarkable      PANCREAS:  Unremarkable      ADRENAL GLANDS:  Unremarkable      KIDNEYS/URETERS: Persistent large left perinephric hematoma exerting significant mass effect on the left kidney reidentified focus of active bleeding from the posterior cortex of the left kidney #3/68      Maximum AP diameter of the left perinephric hematoma was 15 2 cm on today's earlier study and 16 4 cm on this study      STOMACH AND BOWEL:  Unremarkable      APPENDIX:  No findings to suggest appendicitis      ABDOMINOPELVIC CAVITY: Left perinephric hematoma as detailed above   Increased 86 abdominal ascites/hemorrhage now of a moderate quantity      No pneumoperitoneum      VESSELS:  Unremarkable for patient's age      PELVIS     REPRODUCTIVE ORGANS:  Unremarkable for patient's age      URINARY BLADDER:  Unremarkable      ABDOMINAL WALL/INGUINAL REGIONS:  Unremarkable      OSSEOUS STRUCTURES:  No acute fracture or destructive osseous lesion      IMPRESSION:     Increased size of the known left perinephric hematoma status post left renal arterial embolization with a site of recurrent posterior perinephric active hemorrhage      Severe mass effect from the hematoma on the left kidney      There is also increased intra-abdominal hemorrhage      Small left and trace right pleural effusion; no acute pulmonary arterial embolism      Diffuse esophageal dilation with moderate circumferential thickening of the distal esophagus in keeping with a nonspecific esophagitis                I personally discussed this study with Rosas Mcmillan on 5/3/2023 4:50 PM            Workstation performed: [id number]    Labs:  Recent Labs     05/03/23  0130 05/03/23  0550 05/03/23  0640 05/03/23  0833 05/03/23  1858 05/04/23  0113 05/04/23  0435   WBC 15 69* 29 35* 34 02* 34 32* 22 63* 19 02* 19 26*     Recent Labs     05/03/23  0310 05/03/23  0550 05/03/23  0640 05/03/23  0637 05/03/23  1218 05/03/23  1524 05/03/23  1858 05/04/23  0113 05/04/23  0435 05/04/23  1215   HGB 12 1 10 3* 10 9* 10 8* 10 5* 9 2* 11 0* 10 2* 10 1* 10 4*     Recent Labs     05/03/23  0130 05/03/23  0640 05/03/23  0833 05/03/23  1513 05/03/23  1858 05/04/23  0113 05/04/23  0435   CREATININE 1 18 1 31* 1 41* 2 41* 2 28* 2 40* 2 42*       History:  Social History     Socioeconomic History   • Marital status: /Civil Union     Spouse name: None   • Number of children: None   • Years of education: None   • Highest education level: None   Occupational History   • None   Tobacco Use   • Smoking status: None   • Smokeless tobacco: None   Substance and Sexual Activity   • Alcohol use: None   • Drug use: None   • Sexual activity: None   Other Topics Concern   • None   Social History Narrative   • None     Social Determinants of Health     Financial Resource Strain: Not on file   Food Insecurity: Not on file   Transportation Needs: No Transportation Needs   • Lack of Transportation (Medical): No   • Lack of Transportation (Non-Medical): No   Physical Activity: Not on file   Stress: Not on file   Social Connections: Not on file   Intimate Partner Violence: Not on file   Housing Stability: Not on file     Past Medical History:   Diagnosis Date   • CAD (coronary artery disease)    • DM (diabetes mellitus) ([facility] )    • Leukemia ([facility] )    • Seizure disorder ([place] [address] )      Past Surgical History:   Procedure Laterality Date   • CORONARY ARTERY BYPASS GRAFT     • IR EMBOLIZATION (SPECIFY VESSEL OR SITE)  5/3/2023     No family history on file      Bernie Alex PA-C  Date: 5/4/2023 Time: 2:50 PM

## 2023-05-04 NOTE — PROGRESS NOTES
Report received from Luna Fagan, [address] [facility]  VSS  Patient resting comfortably in bed  Propofol infusing  Patients wife is at bedside

## 2023-05-04 NOTE — PLAN OF CARE
Problem: MOBILITY - ADULT  Goal: Maintain or return to baseline ADL function  Description: INTERVENTIONS:  -  Assess patient's ability to carry out ADLs; assess patient's baseline for ADL function and identify physical deficits which impact ability to perform ADLs (bathing, care of mouth/teeth, toileting, grooming, dressing, etc )  - Assess/evaluate cause of self-care deficits   - Assess range of motion  - Assess patient's mobility; develop plan if impaired  - Assess patient's need for assistive devices and provide as appropriate  - Encourage maximum independence but intervene and supervise when necessary  - Involve family in performance of ADLs  - Assess for home care needs following discharge   - Consider OT consult to assist with ADL evaluation and planning for discharge  - Provide patient education as appropriate  Outcome: Progressing  Goal: Maintains/Returns to pre admission functional level  Description: INTERVENTIONS:  - Perform BMAT or MOVE assessment daily    - Set and communicate daily mobility goal to care team and patient/family/caregiver  - Collaborate with rehabilitation services on mobility goals if consulted  - Perform Range of Motion 3 times a day  - Reposition patient every 2 hours  - Out of bed for toileting  - Record patient progress and toleration of activity level   Outcome: Progressing     Problem: Nutrition/Hydration-ADULT  Goal: Nutrient/Hydration intake appropriate for improving, restoring or maintaining nutritional needs  Description: Monitor and assess patient's nutrition/hydration status for malnutrition  Collaborate with interdisciplinary team and initiate plan and interventions as ordered  Monitor patient's weight and dietary intake as ordered or per policy  Utilize nutrition screening tool and intervene as necessary  Determine patient's food preferences and provide high-protein, high-caloric foods as appropriate       INTERVENTIONS:  - Monitor oral intake, urinary output, labs, and treatment plans  - Assess nutrition and hydration status and recommend course of action  - Evaluate amount of meals eaten  - Assist patient with eating if necessary   - Allow adequate time for meals  - Recommend/ encourage appropriate diets, oral nutritional supplements, and vitamin/mineral supplements  - Order, calculate, and assess calorie counts as needed  - Recommend, monitor, and adjust tube feedings and TPN/PPN based on assessed needs  - Assess need for intravenous fluids  - Provide specific nutrition/hydration education as appropriate  - Include patient/family/caregiver in decisions related to nutrition  Outcome: Progressing     Problem: Prexisting or High Potential for Compromised Skin Integrity  Goal: Skin integrity is maintained or improved  Description: INTERVENTIONS:  - Identify patients at risk for skin breakdown  - Assess and monitor skin integrity  - Assess and monitor nutrition and hydration status  - Monitor labs   - Assess for incontinence   - Turn and reposition patient  - Assist with mobility/ambulation  - Relieve pressure over bony prominences  - Avoid friction and shearing  - Provide appropriate hygiene as needed including keeping skin clean and dry  - Evaluate need for skin moisturizer/barrier cream  - Collaborate with interdisciplinary team   - Patient/family teaching  - Consider wound care consult   Outcome: Progressing

## 2023-05-04 NOTE — UTILIZATION REVIEW
Initial Clinical Review    Admission: Date/Time/Statement:   Admission Orders (From admission, onward)     Ordered        05/03/23 0300  Inpatient Admission  Once                      Orders Placed This Encounter   Procedures   • Inpatient Admission     Standing Status:   Standing     Number of Occurrences:   1     Order Specific Question:   Level of Care     Answer:   Level 1 Stepdown [13]     Order Specific Question:   Bed Type     Answer:   Trauma [7]     Order Specific Question:   Estimated length of stay     Answer:   More than 2 Midnights     Order Specific Question:   Certification     Answer:   I certify that inpatient services are medically necessary for this patient for a duration of greater than two midnights  See H&P and MD Progress Notes for additional information about the patient's course of treatment  ED Arrival Information     Expected   -    Arrival   5/3/2023 01:10    Acuity   Urgent            Means of arrival   Ambulance    Escorted by   [facility]    Service   Trauma    Admission type   Emergency            Arrival complaint   abd pain           Chief Complaint   Patient presents with   • Abdominal Pain     Pt reports abd pain since midnight and constipation  Last BM yesterday       Initial Presentation:   72 yom to ER from home via EMS c/o acute marked LLQ abd pain  Hx Seizure disorder, DM type 2, CAD on Xarelto, leukemia in remission  Presents bradycardic, hypertensive, with marked abdominal tenderness to palpation of left lower quadrant, left upper quadrant  Patient does not tolerate any palpation and screams in pain to physical exam   Left side of patient's abdomen is firm  No tenderness on right side of patient's abdomen, no guarding, no rebound  No overlying skin changes  Abdomen is distended  Marked discomfort with flexion of left lower extremity   Admission work-up showing large subcapsular hematoma compressing the left kidney measuring approximately 13 5 x 15 8 x 9 7 cm and up to 6 2 cm in thickness  Linear hyperdensities within the posterior aspect of the hematoma compatible with active extravasation on imaging  Admitted to inpatient status for L kidney capsule rupture  Received Kcentra and DDAVP for reversal  Received 2 units uncrossmatched PRBC in ER  IR consulted for embolization / bleeding control    To IR for: IR ANGIOGRAM EMBOLIZATION  Findings:   Active bleeding from the lower pole of the left kidney  Numerous small arterial branches feeding this bleeding which is likely venous in origin  Embolization of 4 feeding branches with Gelfoam and coils  The kidney is compressed and distorted by the adjacent hematoma  Right groin access Perclose hemostasis    Date: 5/4/23   Day 2:   Lower pole initially embolized and patient redeveloped shock  Returned to IR for repeat embolization of lower pole  Now with stable hemodynamics and h/h  If re-bleeds will likely need emergent nephrectomy for bleeding control  Continue 3-way potter  SHELIA 2/2 hemorrhagic shock, contrast exposure, left kidney capsule rupture, continue IVF  Remains intubated, awakens off sedation         ED Triage Vitals   Temperature Pulse Respirations Blood Pressure SpO2   05/03/23 0112 05/03/23 0112 05/03/23 0112 05/03/23 0112 05/03/23 0112   97 5 °F (36 4 °C) (!) 52 18 (!) 209/125 99 %      Temp Source Heart Rate Source Patient Position - Orthostatic VS BP Location FiO2 (%)   05/03/23 0112 05/03/23 0112 05/03/23 0112 05/03/23 0112 05/03/23 1845   Axillary Monitor Lying Right arm 40      Pain Score       05/03/23 0127       10 - Worst Possible Pain          Wt Readings from Last 1 Encounters:   05/04/23 125 kg (275 lb)     Additional Vital Signs:   Date/Time Temp Pulse Resp BP MAP (mmHg) Arterial Line BP MAP SpO2 FiO2 (%) Calculated FIO2 (%) - Nasal Cannula Nasal Cannula O2 Flow Rate (L/min) O2 Device O2 Interface Device Patient Position - Orthostatic VS   05/04/23 0812 -- -- -- -- -- -- -- 98 % -- -- -- -- [[email or web address]] : 19 y/o female with cough x 2-2.5 weeks, now with associated fever.  Lungs CTAB on exam, pt afebrile today on exam.  Will treat for mycoplasma pneumonia given length of symptoms and persistent cough.\par \par Plan\par - Azithromycin 500 mg po x 1 given today under direct supervision.  Azithromycin 250 mg x 4 tablets dispensed to pt to take daily for the next 4 days to complete a 5 day course.\par - RTC next week for f/u.  Encouraged to seek medical care over the weekend for any worsening or new symptoms.  Pt verbalized understanding. -- --   05/04/23 0730 -- 76 27 Abnormal  146/75 104 162/66 96 mmHg 99 % -- -- -- -- -- --   05/04/23 0715 -- 73 25 Abnormal  -- -- 133/57 81 mmHg 98 % -- -- -- -- -- --   05/04/23 0700 100 °F (37 8 °C) 85 29 Abnormal  146/75 104 135/58 83 mmHg 100 % -- -- -- Ventilator -- Lying   05/04/23 0645 -- 77 26 Abnormal  -- -- 137/59 84 mmHg 98 % -- -- -- -- -- --   05/04/23 0630 -- 81 27 Abnormal  -- -- 147/64 90 mmHg 99 % -- -- -- -- -- --   05/04/23 0500 -- 82 27 Abnormal  -- -- 146/63 89 mmHg 98 % -- -- -- -- -- --   05/04/23 0445 -- 78 26 Abnormal  -- -- 142/52 84 mmHg 98 % -- -- -- -- -- --   05/04/23 0430 -- 76 26 Abnormal  -- -- 138/58 85 mmHg 98 % -- -- -- -- -- --   05/04/23 0415 -- 79 26 Abnormal  -- -- 133/59 82 mmHg 98 % -- -- -- -- -- --   05/04/23 0400 -- 87 29 Abnormal  -- -- 132/60 84 mmHg 98 % -- -- -- Ventilator -- --   05/03/23 2313 100 9 °F (38 3 °C) Abnormal  -- -- -- -- -- -- -- -- -- -- -- -- --   05/03/23 2245 -- 80 27 Abnormal  104/59 76 104/49 65 mmHg 98 % -- -- -- -- -- --   05/03/23 2230 -- 79 27 Abnormal  108/61 79 108/51 69 mmHg 98 % -- -- -- -- -- --   05/03/23 2228 99 3 °F (37 4 °C) -- -- -- -- -- -- -- -- -- -- -- -- --   05/03/23 2215 -- 83 28 Abnormal  100/63 76 105/50 67 mmHg 98 % -- -- -- -- -- --   05/03/23 2200 -- 88 30 Abnormal  102/63 77 113/53 72 mmHg 98 % -- -- -- -- -- --   05/03/23 2145 -- 97 30 Abnormal  108/65 81 108/51 69 mmHg 97 % -- -- -- -- -- --   05/03/23 2130 -- 102 29 Abnormal  111/69 84 124/57 78 mmHg 97 % -- -- -- -- -- --   05/03/23 2119 -- 102 -- 113/66 -- -- -- -- -- -- -- -- -- --   05/03/23 2115 -- 102 29 Abnormal  113/66 85 114/54 72 mmHg 97 % -- -- -- -- -- --   05/03/23 2100 -- 100 29 Abnormal  110/63 80 111/52 70 mmHg 97 % -- -- -- -- -- --   05/03/23 2045 -- 99 27 Abnormal  101/63 76 109/52 70 mmHg 97 % -- -- -- -- -- --   05/03/23 2030 -- 101 25 Abnormal  107/61 79 103/51 67 mmHg 96 % -- -- -- -- -- --   05/03/23 2015 -- 101 26 Abnormal  96/58 72 96/49 63 mmHg Abnormal  96 % -- -- -- -- -- --   05/03/23 2000 -- 103 26 Abnormal  99/59 74 90/48 61 mmHg Abnormal  95 % -- -- -- -- -- --   05/03/23 1945 -- 106 Abnormal  27 Abnormal  91/53 67 84/46 57 mmHg Abnormal  95 % -- -- -- -- -- --   05/03/23 1930 -- 107 Abnormal  27 Abnormal  -- -- 85/47 58 mmHg Abnormal  95 % -- -- -- -- -- --   05/03/23 1915 -- 112 Abnormal  28 Abnormal  -- -- 91/50 62 mmHg Abnormal  95 % -- -- -- -- -- --   05/03/23 1845 99 6 °F (37 6 °C) 126 Abnormal  25 Abnormal  140/81 105 -- -- 98 % 40 -- -- Ventilator -- --   05/03/23 1536 -- -- -- -- -- -- -- -- -- -- -- -- HFNC prongs --   05/03/23 1523 -- -- -- -- -- -- -- 99 % -- 44 6 L/min Nasal cannula -- --   05/03/23 1519 -- 140 Abnormal  55 Abnormal  84/50 Abnormal  62 Abnormal  -- -- -- -- -- -- -- -- --   05/03/23 1500 -- 130 Abnormal  36 Abnormal  80/52 Abnormal  62 Abnormal  -- -- 99 % -- -- -- -- -- --   05/03/23 1448 99 2 °F (37 3 °C) 132 Abnormal  38 Abnormal  87/55 Abnormal  65 -- -- -- -- -- -- -- -- --   05/03/23 1418 -- 130 Abnormal  -- 87/55 Abnormal  66 -- -- -- -- -- -- -- -- --   05/03/23 1338 -- 124 Abnormal  -- 88/50 Abnormal  63 Abnormal  -- -- -- -- -- -- -- -- --   05/03/23 1252 -- -- -- 86/51 Abnormal  64 Abnormal  -- -- 100 % -- -- -- -- -- --   05/03/23 1232 -- 120 Abnormal  -- 90/59 69 -- -- -- -- -- -- -- -- --   05/03/23 1218 -- 121 Abnormal  -- 92/61 72 -- -- -- -- -- -- -- -- --   05/03/23 1124 -- 109 Abnormal  -- 88/53 Abnormal  64 Abnormal  -- -- -- -- -- -- -- -- --   05/03/23 1045 98 °F (36 7 °C) 102 24 Abnormal  86/57 Abnormal  68 -- -- 95 % -- -- -- None (Room air) -- Lying   05/03/23 1041 -- 105 -- 81/55 Abnormal  63 Abnormal  -- -- 98 % -- -- -- -- -- --   05/03/23 1027 -- 107 Abnormal  -- 88/56 Abnormal  66 -- -- -- -- -- -- -- -- --   05/03/23 1002 -- 108 Abnormal  -- 81/54 Abnormal  63 Abnormal  -- -- -- -- -- -- -- -- --   05/03/23 0956 -- 108 Abnormal  -- 84/53 Abnormal  64 Abnormal  -- -- -- -- -- -- -- -- --   05/03/23 0945 -- 107 Abnormal  -- 87/54 Abnormal  64 Abnormal  -- -- -- -- -- -- -- -- --   05/03/23 0916 -- 110 Abnormal  -- 85/53 Abnormal  63 Abnormal  -- -- 95 % -- -- -- None (Room air) -- --   05/03/23 0906 -- 115 Abnormal  -- -- -- -- -- -- -- -- -- -- -- --   05/03/23 0903 -- 116 Abnormal  -- 84/50 Abnormal  62 Abnormal  -- -- -- -- -- -- -- -- --   05/03/23 0851 -- 120 Abnormal  -- -- -- -- -- -- -- -- -- -- -- --   05/03/23 0847 -- 120 Abnormal  -- 83/54 Abnormal  63 Abnormal  -- -- -- -- -- -- -- -- --   05/03/23 0815 -- 119 Abnormal  -- 101/61 -- -- -- -- -- -- -- -- -- --   05/03/23 0814 -- 122 Abnormal  -- 96/59 -- -- -- -- -- -- -- -- -- --   05/03/23 0800 -- -- -- -- -- -- -- 95 % -- 32 3 L/min -- -- --   05/03/23 0730 97 8 °F (36 6 °C) 117 Abnormal  24 Abnormal  96/60 73 -- -- 95 % -- -- -- None (Room air) -- Lying   05/03/23 0726 -- 119 Abnormal  -- 84/52 Abnormal  62 Abnormal  -- -- -- -- -- -- -- -- --   05/03/23 0710 -- 115 Abnormal  23 Abnormal  85/58 Abnormal  65 -- -- 97 % -- -- -- -- -- --   05/03/23 0700 -- 115 Abnormal  19 79/51 Abnormal  60 Abnormal  -- -- 98 % -- -- -- -- -- --   05/03/23 0623 97 6 °F (36 4 °C) 116 Abnormal  19 105/64 79 -- -- 94 % -- -- -- -- -- Lying     Pertinent Labs/Diagnostic Test Results:   5/4 Echo=  •  Left Ventricle: Left ventricular cavity size is normal  There is moderate concentric hypertrophy  The left ventricular ejection fraction is 60%  Systolic function is normal  Wall motion is normal  Diastolic function is mildly abnormal, consistent with grade I (abnormal) relaxation  •  Right Ventricle: Right ventricular cavity size is normal  Systolic function is normal   •  Left Atrium: The atrium is mildly dilated  •  Aortic Valve: There is mild to moderate regurgitation  •  Aorta: The aortic root is moderately dilated  The ascending aorta is mildly dilated      X-ray chest 1 view   Final Result  (05/04 0824)      Satisfactory position of the endotracheal tube       CTA chest (pe study) abdomen pelvis routine contrast   Final Result  (05/03 1651)      Increased size of the known left perinephric hematoma status post left renal arterial embolization with a site of recurrent posterior perinephric active hemorrhage  Severe mass effect from the hematoma on the left kidney  There is also increased intra-abdominal hemorrhage  Small left and trace right pleural effusion; no acute pulmonary arterial embolism  Diffuse esophageal dilation with moderate circumferential thickening of the distal esophagus in keeping with a nonspecific esophagitis  IR embolization (specify vessel or site)   Final Result (05/03 2155)   Impression:      Active bleeding from the lower pole of the left kidney, numerous small arterial branches feeding this bleeding which is likely venous in origin  Embolization of 4 feeding branches with Gelfoam and coils      The kidney is compressed and distorted by the adjacent hematoma      CT abdomen pelvis with contrast   Final Result  (05/03 0329)      1  Large subcapsular hematoma compressing the left kidney measuring approximately 13 5 x 15 8 x 9 7 cm and up to 6 2 cm in thickness  Linear hyperdensities within the posterior aspect of the hematoma compatible with active extravasation  There is    surrounding perinephric and retroperitoneal hemorrhage  2   Vague hypodensities are seen within the superior aspect of the spleen measuring up to 4 cm and 1 9 cm respectively  These areas may reflect splenic infarction less likely laceration  There are foci of hyperdensity at the superior aspect of the spleen    which was present on prior exam and likely represent a hemangioma versus calcification  3   Flattening of the IVC compatible with hypovolemia  4   Cholelithiasis without evidence of cholecystitis  5   Stable 2 1 cm cystic lesion within the body of the pancreas     6   Dense opacity in the left lower lobe which may be due to atelectasis versus pneumonia  Small left pleural effusion  XR chest 1 view portable   Final Result  (05/03 0853)      No free air is seen under the diaphragms   There is a small left effusion and mild bibasilar subsegmental atelectasis      XR abdomen 1 view kub   Final Result  (05/03 0854)      No evidence of bowel obstruction     5/3 Ekg=  Sinus rhythm with Premature ventricular complexes  Nonspecific ST abnormality    Results from last 7 days   Lab Units 05/04/23  0435 05/04/23  0113 05/03/23  1858 05/03/23  1524 05/03/23  1218 05/03/23  0833 05/03/23  0640 05/03/23  0550   WBC Thousand/uL 19 26* 19 02* 22 63*  --   --  34 32* 34 02* 29 35*   HEMOGLOBIN g/dL 10 1* 10 2* 11 0*  --  10 5* 10 8* 10 9* 10 3*   I STAT HEMOGLOBIN g/dl  --   --   --  9 2*  --   --   --   --    HEMATOCRIT % 28 6* 28 5* 32 5*  --  31 6* 32 1* 32 1* 30 4*   HEMATOCRIT, ISTAT %  --   --   --  27*  --   --   --   --    PLATELETS Thousands/uL 129* 120* 130*  --   --  246 224 205   NEUTROS ABS Thousands/µL  --   --  19 15*  --   --   --  29 82* 26 12*   BANDS PCT %  --   --   --   --   --  2  --   --      Results from last 7 days   Lab Units 05/04/23  0435 05/04/23  0113 05/03/23  1904 05/03/23  1858 05/03/23  1524 05/03/23  1513 05/03/23  0833 05/03/23  0640   SODIUM mmol/L 138 137  --  137  --  139 140 141   POTASSIUM mmol/L 4 5 4 6  --  5 2  --  5 3 4 0 3 8   CHLORIDE mmol/L 109* 108  --  109*  --  106 109* 109*   CO2 mmol/L 19* 20*  --  18*  --  14* 21 24   CO2, I-STAT mmol/L  --   --   --   --  11*  --   --   --    ANION GAP mmol/L 10 9  --  10  --  19* 10 8   BUN mg/dL 33* 31*  --  26*  --  26* 19 18   CREATININE mg/dL 2 42* 2 40*  --  2 28*  --  2 41* 1 41* 1 31*   EGFR ml/min/1 73sq m 25 26  --  27  --  26 49 54   CALCIUM mg/dL 8 3* 8 4  --  8 2*  --  7 4* 7 2* 7 2*   CALCIUM, IONIZED mmol/L 1 07*  --  1 12  --   --   --   --   --    CALCIUM, IONIZED, ISTAT mmol/L  --   --   --   --  1 06*  --   --   --    MAGNESIUM mg/dL 2 2 2 2  --  1 7*  --  1 9  --  1 8*   PHOSPHORUS mg/dL 3 9 3 7  --  5 3*  --  4 5*  --  4 2*     Results from last 7 days   Lab Units 05/03/23  1513 05/03/23  0833 05/03/23  0640 05/03/23  0130   AST U/L 31 14 13 15   ALT U/L 16 10 9 14   ALK PHOS U/L 39 32* 32* 53   TOTAL PROTEIN g/dL 4 9* 4 5* 4 5* 6 1*   ALBUMIN g/dL 3 3* 3 1* 3 1* 3 9   TOTAL BILIRUBIN mg/dL 0 84 0 74 0 63 0 50     Results from last 7 days   Lab Units 05/04/23  0553 05/03/23  2359 05/03/23  2003 05/03/23  1847 05/03/23  1124 05/03/23  1046 05/03/23  0820   POC GLUCOSE mg/dl 141* 165* 174* 196* 256* 276* 283*     Results from last 7 days   Lab Units 05/04/23  0435 05/04/23  0113 05/03/23  1858 05/03/23  1513 05/03/23  0833 05/03/23  0640 05/03/23  0130   GLUCOSE RANDOM mg/dL 157* 153* 200* 279* 285* 285* 265*     Results from last 7 days   Lab Units 05/04/23  0114 05/03/23  1903   PH ART  7 433 7 292*   PCO2 ART mm Hg 30 1* 39 0   PO2 ART mm Hg 96 4 92 6   HCO3 ART mmol/L 19 7* 18 4*   BASE EXC ART mmol/L -3 7 -7 5   O2 CONTENT ART mL/dL 14 6* 16 4   O2 HGB, ARTERIAL % 95 9 96 4   ABG SOURCE  Line, Arterial Line, Arterial     Results from last 7 days   Lab Units 05/03/23  1524   I STAT BASE EXC mmol/L -13*   I STAT O2 SAT % 98*   ISTAT PH ART  7 353   I STAT ART PCO2 mm HG 19 3*   I STAT ART PO2 mm  0   I STAT ART HCO3 mmol/L 10 7*     Results from last 7 days   Lab Units 05/04/23  0113 05/04/23  0000 05/03/23  2100 05/03/23  1858 05/03/23  1452   HS TNI 0HR ng/L  --   --  1,150*  --  187*   HS TNI 2HR ng/L  --  1,981*  --  753*  --    HSTNI D2 ng/L  --  831*  --  566*  --    HS TNI 4HR ng/L 2,316*  --   --   --   --    HSTNI D4 ng/L 1,166*  --   --   --   --      Results from last 7 days   Lab Units 05/04/23  0113 05/03/23  1858 05/03/23  1513 05/03/23  0640 05/03/23  0310   PROTIME seconds 19 9* 21 3* 24 0*   < > 41 7*   INR  1 67* 1 81* 2 12*   < > 4 33*   PTT seconds  --   --   --   --  41*    < > = values in this interval not displayed  Results from last 7 days   Lab Units 05/04/23  0000 05/03/23  2100 05/03/23  1858 05/03/23  1513   LACTIC ACID mmol/L 2 0 3 1* 4 0* 10 1*     Results from last 7 days   Lab Units 05/04/23  0547 05/03/23  1613 05/03/23  0318   UNIT PRODUCT CODE  E3743V64  Q4933P60  F5295Y52  L9670M94  N7924T43  E8826O84  S6382V21  D5834L08  J2685N73  E8067S23  V7994U16  X9459U70 S7837M82  Y1394W52 G3328O86  R8350X78   UNIT NUMBER  [id number]  [id number]  [id number]  [id number]  [id number]  [id number]  [id number]  [id number]  [id number]  [id number]  [id number]  [id number]  [id number]  [id number]   UNITABO  AB  AB  AB  AB  O  O  O  O  AB  AB  B  B B  B O  O   UNITRH  POS  NEG  POS  POS  POS  POS  POS  POS  POS  POS  POS  POS NEG  NEG POS  POS   CROSSMATCH  Compatible  Compatible  Compatible  Compatible  Compatible  Compatible Compatible  Compatible Compatible  Compatible   UNIT DISPENSE STATUS  Presumed Trans  Presumed Trans  Crossmatched  Crossmatched  Presumed Trans  Presumed Trans  Crossmatched  Crossmatched  Presumed Trans  Presumed Trans  Presumed Trans  Presumed Trans Crossmatched  Crossmatched Presumed Trans  Presumed Trans   UNIT PRODUCT VOL mL 280  280  250  250  350  350  350  350  280  280  350  350 350  350 350  350     Results from last 7 days   Lab Units 05/03/23  0130   LIPASE u/L 23     ED Treatment:   Medication Administration from 05/03/2023 0110 to 05/03/2023 5365       Date/Time Order Dose Route Action     05/03/2023 0127 EDT fentanyl citrate (PF) 100 MCG/2ML 50 mcg 50 mcg Intravenous Given     05/03/2023 0134 EDT ondansetron (ZOFRAN) injection 4 mg 4 mg Intravenous Given     05/03/2023 0204 EDT HYDROmorphone (DILAUDID) injection 1 mg 1 mg Intravenous Given     05/03/2023 0212 EDT sodium chloride 0 9 % bolus 1,000 mL 1,000 mL Intravenous New Bag     05/03/2023 0235 EDT iohexol (OMNIPAQUE) 350 MG/ML injection (SINGLE-DOSE) 100 mL 100 mL Intravenous Given     05/03/2023 0321 EDT desmopressin (DDAVP) 29 2 mcg in sodium chloride 0 9 % 50 mL IVPB 29 2 mcg Intravenous New Bag     05/03/2023 0315 EDT prothrombin complex concentrate (human) (Kcentra) 2,000 Units 2,000 Units Intravenous Given     05/03/2023 0418 EDT multi-electrolyte (PLASMALYTE-A/ISOLYTE-S PH 7 4) IV solution -- Intravenous Continue from Pre-op     05/03/2023 0335 EDT multi-electrolyte (PLASMALYTE-A/ISOLYTE-S PH 7 4) IV solution 125 mL/hr Intravenous New Bag     05/03/2023 0355 EDT ondansetron (ZOFRAN) injection 4 mg 4 mg Intravenous Given by Other     05/03/2023 0433 EDT lidocaine (PF) (XYLOCAINE-MPF) 1 % injection 10 mL Infiltration Given        Past Medical History:   Diagnosis Date   • CAD (coronary artery disease)    • DM (diabetes mellitus) ([facility] )    • Leukemia ([facility] )    • Seizure disorder ([facility] )      Present on Admission:  • Kidney capsule rupture, left, initial encounter  • Type 2 diabetes mellitus ([facility] )      Admitting Diagnosis: Hemorrhagic shock ([facility] ) [R57 8]  Abdominal pain [R10 9]  Renal hemorrhage, left [N28 89]  Kidney capsule rupture, left, initial encounter [S37 062A]  Age/Sex: 72 y o  male  Admission Orders:  accuchecks  Pt/ot eval & tx  Scd  Huggins cath  Consult IR  Transfuse 4uprbc's, 1 unit plasma, 5/3  Neuro checks q2h  Puncture site care: 1)  Dress puncture site with clear dressing or bandage   2)  Do not use sandbag or apply pressure dressing   3)  Remove dressing 24 hours after procedure and replace with water seal band aid  Consult cardio    Scheduled Medications:  acetaminophen, 975 mg, Oral, Q8H JAD  cefepime, 2,000 mg, Intravenous, Q12H  chlorhexidine, 15 mL, Mouth/Throat, Q12H JAD  docusate sodium, 100 mg, Oral, BID  ezetimibe, 10 mg, Oral, Daily With Dinner   And  pravastatin, 80 mg, Oral, Daily With Dinner  insulin lispro, 1-6 Units, Subcutaneous, Q6H [address]  lacosamide, 100 mg, Oral, Q12H [address]  lidocaine, 1 patch, Topical, Daily  methocarbamol, 500 mg, Oral, Q8H [address]  metoprolol tartrate, 25 mg, Oral, Q12H JAD  metroNIDAZOLE, 500 mg, Intravenous, Q8H  pantoprazole, 40 mg, Intravenous, Q12H [address]    Continuous IV Infusions:  multi-electrolyte, 100 mL/hr, Intravenous, Continuous  propofol, 5-50 mcg/kg/min, Intravenous, Titrated    PRN Meds:  fentanyl citrate (PF), 50 mcg, Intravenous, Q1H PRN  metoclopramide, 10 mg, Intravenous, Q6H PRN  ondansetron, 4 mg, Intravenous, Q6H PRN    Network Utilization Review Department  ATTENTION: Please call with any questions or concerns to [phone] and carefully listen to the prompts so that you are directed to the right person  All voicemails are confidential   [place] all requests for admission clinical reviews, approved or denied determinations and any other requests to dedicated fax number below belonging to the campus where the patient is receiving treatment   List of dedicated fax numbers for the Facilities:  [address] DENIALS (Administrative/Medical Necessity) [phone]   [address] (Maternity/NICU/Pediatrics) [phone]   [address] Castel[address] [phone]   [facility] [address] [phone]   [address] [place] [address] [facility] [phone]   15[facility] 134 815 [facility] [phone]

## 2023-05-04 NOTE — PROGRESS NOTES
"[facility]  Progress Note  Name: Veda Parrish  MRN: [id number]  Unit/Bed#: ICU 12 I Date of Admission: 5/3/2023   Date of Service: 5/4/2023 I Hospital Day: 1    Assessment/Plan   * Kidney capsule rupture, left, initial encounter  Assessment & Plan  · Non-traumatic  · Patient was reportedly in his usual state of health when he awoke earlier this evening with sudden onset left abdominal / flank pain  Review of notes from [facility] -- he does have history of renal cysts/stones in the past  He is on Xarelto, and was given ASA by his wife prior to arrival to hospital   · CT ap: on my review -- left renal capsule rupture with contrast extravasation, fluid surrounding likely RP hemorrhage  Radiology read: \"Large subcapsular hematoma compressing the left kidney measuring 13 5 x 15 8 x 9 7 cm and up to 6 2 cm in thickness  Linear hyperdensities within the posterior aspect of the hematoma compatible with active extravasation  There is surrounding perinephric and retroperitoneal hemorrhage  \"  · Hgb 15 > 12 while in ER  · INR 4 3 > 1 67  · Received Kcentra and DDAVP for reversal  · Resuscitated with blood products as below  · IR consulted for embolization / bleeding control  · Lower pole initially embolized and patient redeveloped shock  Returned to IR for repeat embolization of lower pole  · Now with stable hemodynamics and h/h  · If re-bleeds will likely need emergent nephrectomy for bleeding control  · Serial abdominal exams  · Continue 3 way potter, will need CBI if bloody UO noted  · Monitor UO and renal indices closely      Hemorrhagic shock [facility])  Assessment & Plan  Lab Results   Component Value Date    HGB 10 2 (L) 05/04/2023    HGB 11 0 (L) 05/03/2023    HGB 9 2 (L) 05/03/2023    HGB 10 5 (L) 05/03/2023    HGB 10 8 (L) 05/03/2023    HGB 10 9 (L) 05/03/2023     · 2/2 kidney capsule rupture in the setting of coagulopathy on home Xarelto and ASA in route to ER  · Reversed with Mercedes Connor and " "DDAVP  · Total Blood products to date  · PRBCs 4 units  · FFP 4 units  · Transfuse for HGB < 7 or hemodynamic instability  · Required levophed for short period of time in IR -- weaned off prior to completion of procedure  · Maintain MAP > 65 mmHg    Splenic hemorrhage  Assessment & Plan  · Noted on CT as above  Per radiology: \"There is vague hypodensity within the superior spleen measuring up to 4 cm  In addition there is a 1 9 cm hypodensity in the superior spleen  There are small foci of hyperdensity within the superior aspect of the spleen  There is perisplenic hemorrhage  \"  · IR consulted as above  · Monitor ABD exams   · Transfuse as above    SHELIA (acute kidney injury) [facility])  Assessment & Plan  Lab Results   Component Value Date    CREATININE 2 40 (H) 05/04/2023    CREATININE 2 28 (H) 05/03/2023    CREATININE 2 41 (H) 05/03/2023       · 2/2 hemorrhagic shock, contrast exposure, left kidney capsule rupture  · Baseline creatinine 0 9, on admission 1 18  · Continue IVF  · Maintain 3 way potter  · Monitor UO and renal indices  · Avoid hypotension, nephrotoxins    Elevated troponin  Assessment & Plan  · hsTn  187 > 753 > 1150 > 1981 > 2316  · Developed CP in the setting of hemorrhagic shock  Patient with history of CAD and previous CABG x2 in 2019  · EKG initially with ST depressions / T wave inversions in anterolateral leads and new BBB  · EKG now with improved depressions, resolved BBB  T wave inversions in anterolateral leads remain  · Cardiology consulted  · Echo  · Unable to heparinize in the setting of hemorrhagic shock  · Continue statin  · If hgb remains stable start low dose aspirin  · Random hsTn this am pending, continue to trend    Acute respiratory insufficiency  Assessment & Plan  · Intubated for 2nd IR procedure  · Patient returned to ICU intubated    · Sedation with propofol -- titrate for goal RASS 0 to -1  · Vent bundle ordered  · SAT/SBT in am  · ACVC 14/450/40/6  · Titrate FiO2 for SpO2 > " 92%  · Plan for possible extubation today    Coagulopathy (HCC)  Assessment & Plan  · INR 4 33 on arrival -- likely falsely elevated 2/2 Xarelto  Also received ASA by EMS prior to arrival   · Reversed on admission with Gypsy Hind and DDAVP  · Holding AC/AP due to hemorrhagic shock  · INR most recently 1 67  · Hgb stable    CAD (coronary artery disease)  Assessment & Plan  · Hx CABG 2020  · Hold AC/AP for now  · Hold Xarelto  · Continue metoprolol   · Resume statin    Paroxysmal atrial fibrillation (HCC)  Assessment & Plan  · Home regimen: Xarelto and metoprolol  · Holding Xarelto for now, in the setting of hemorrhagic shock/kidney capsule rupture  · Continue telemetry  · Patient is now hemodynamically stable s/p volume resuscitation with blood products and IR procedure for embolization  Was on vasopressors for short period of time during volume resuscitation  No longer on vasopressors  · Continue metoprolol    Type 2 diabetes mellitus [facility])  Assessment & Plan  Lab Results   Component Value Date    HGBA1C 5 9 (H) 04/20/2023       Recent Labs     05/03/23  1124 05/03/23  1847 05/03/23 2003 05/03/23  2359   POCGLU 256* 196* 174* 165*       Blood Sugar Average: Last 72 hrs:  (P) 225   · Holding home glimepiride and metformin for now  · Start accuchecks ACHS with SSI  · Currently n p o  as patient is intubated  · Goal blood glucose 140-180  · Avoid hypoglycemia  · Once able to take PO will need CHO controlled diet    Seizure disorder [facility])  Assessment & Plan  · Last seizure: July 2022 (was off medication at time of seizure due to cost)  · Currently well controlled with Vimpat  · Continue home Vimpat    Leukemia ([facility] [address] )  Assessment & Plan  · S/p chemotherapy -- currently in remission for 14 years           ICU Core Measures     Vented Patient  VAP Bundle  VAP bundle ordered     A: Assess, Prevent, and Manage Pain · Has pain been assessed? Yes  · Need for changes to pain regimen?  No   B: Both Spontaneous Awakening Trials (SATs) and Spontaneous Breathing Trials (SBTs) · Plan to perform spontaneous awakening trial today? Yes   · Plan to perform spontaneous breathing trial today? Yes   · Obvious barriers to extubation? No   C: Choice of Sedation · RASS Goal: 0 Alert and Calm  · Need for changes to sedation or analgesia regimen? No   D: Delirium · CAM-ICU: Negative   E: Early Mobility  · Plan for early mobility? Yes   F: Family Engagement · Plan for family engagement today? Yes       Antibiotic Review: Patient on appropriate coverage based on culture data  Review of Invasive Devices: Potter Plan: Continue for accurate I/O monitoring for 48 hours and 3 way potter in place for possible bladder irrigation    Lavinia Plan: Keep arterial line for hemodynamic monitoring, frequent labs    Prophylaxis:  VTE    Stress Ulcer  covered bypantoprazole (PROTONIX) injection 40 mg [[id number]]       Subjective   HPI/24hr events:   ·   Review of Systems   Unable to perform ROS: Intubated               Objective                            Vitals I/O      Most Recent Min/Max in 24hrs   Temp 100 5 °F (38 1 °C) Temp  Min: 97 6 °F (36 4 °C)  Max: 100 9 °F (38 3 °C)   Pulse 82 Pulse  Min: 76  Max: 140   Resp (!) 27 Resp  Min: 19  Max: 55   /66 BP  Min: 79/51  Max: 140/81   O2 Sat 98 % SpO2  Min: 94 %  Max: 100 %      Intake/Output Summary (Last 24 hours) at 5/4/2023 0519  Last data filed at 5/4/2023 0401  Gross per 24 hour   Intake 9473 27 ml   Output 860 ml   Net 8613 27 ml         Diet NPO     Invasive Monitoring Physical exam   Arterial Line  Lavinia /63  Arterial Line BP  Min: 84/46  Max: 146/63   MAP 89 mmHg  Arterial Line MAP (mmHg)  Min: 57 mmHg  Max: 89 mmHg    Physical Exam  Vitals and nursing note reviewed  Constitutional:       General: He is not in acute distress  Appearance: He is well-developed  He is obese  He is ill-appearing  He is not toxic-appearing  Interventions: He is sedated, intubated and restrained     HENT: Head: Normocephalic and atraumatic  Right Ear: External ear normal       Left Ear: External ear normal       Nose: Nose normal  No congestion or rhinorrhea  Mouth/Throat:      Mouth: Mucous membranes are moist       Pharynx: Oropharynx is clear  No oropharyngeal exudate or posterior oropharyngeal erythema  Eyes:      General: No scleral icterus  Extraocular Movements: Extraocular movements intact  Conjunctiva/sclera: Conjunctivae normal       Pupils: Pupils are equal, round, and reactive to light  Cardiovascular:      Rate and Rhythm: Normal rate and regular rhythm  Pulses: Normal pulses  Heart sounds: Normal heart sounds  No murmur heard  No friction rub  No gallop  Pulmonary:      Effort: Pulmonary effort is normal  No respiratory distress  He is intubated  Breath sounds: Normal breath sounds  Abdominal:      General: Bowel sounds are decreased  There is no distension  Palpations: Abdomen is soft  Tenderness: There is no abdominal tenderness  Musculoskeletal:         General: No swelling  Cervical back: Neck supple  Skin:     General: Skin is warm and dry  Capillary Refill: Capillary refill takes less than 2 seconds  Neurological:      General: No focal deficit present  Mental Status: He is lethargic  GCS: GCS eye subscore is 3  GCS verbal subscore is 1  GCS motor subscore is 6  Cranial Nerves: No cranial nerve deficit or facial asymmetry  Sensory: No sensory deficit  Comments: Awakens off sedation  Gag, cough, and corneal reflexes intact  Psychiatric:         Mood and Affect: Mood normal               Diagnostic Studies      EKG: sinus rhythm with twave inversions  Imagin/3 CXR: ETT in appropriate position, vascular congestion   I have personally reviewed pertinent reports     and I have personally reviewed pertinent films in PACS     Medications:  Scheduled PRN   acetaminophen, 975 mg, Q8H [facility]  cefepime, 2,000 mg, Q12H  chlorhexidine, 15 mL, Q12H JAD  docusate sodium, 100 mg, BID  ezetimibe, 10 mg, Daily With Dinner   And  pravastatin, 80 mg, Daily With Dinner  insulin lispro, 1-6 Units, Q6H JAD  lacosamide, 100 mg, Q12H JAD  lidocaine, 1 patch, Daily  methocarbamol, 500 mg, Q8H [address]  metoprolol tartrate, 25 mg, Q12H JAD  metroNIDAZOLE, 500 mg, Q8H  pantoprazole, 40 mg, Q12H JAD      fentanyl citrate (PF), 50 mcg, Q1H PRN  metoclopramide, 10 mg, Q6H PRN  ondansetron, 4 mg, Q6H PRN       Continuous    multi-electrolyte, 100 mL/hr, Last Rate: 100 mL/hr (05/03/23 2129)  propofol, 5-50 mcg/kg/min, Last Rate: 25 mcg/kg/min (05/04/23 0332)         Labs:    CBC    Recent Labs     05/03/23  0833 05/03/23  1218 05/03/23 1858 05/04/23  0113   WBC 34 32*  --  22 63* 19 02*   HGB 10 8*   < > 11 0* 10 2*   HCT 32 1*   < > 32 5* 28 5*     --  130* 120*   BANDSPCT 2  --   --   --     < > = values in this interval not displayed  BMP    Recent Labs     05/03/23 1858 05/04/23  0113   SODIUM 137 137   K 5 2 4 6   * 108   CO2 18* 20*   AGAP 10 9   BUN 26* 31*   CREATININE 2 28* 2 40*   CALCIUM 8 2* 8 4       Coags    Recent Labs     05/03/23  0310 05/03/23  0640 05/03/23 1858 05/04/23  0113   INR 4 33*   < > 1 81* 1 67*   PTT 41*  --   --   --     < > = values in this interval not displayed          Additional Electrolytes  Recent Labs     05/03/23 1858 05/03/23  1904 05/04/23  0113 05/04/23  0435   MG 1 7*  --  2 2  --    PHOS 5 3*  --  3 7  --    CAIONIZED  --  1 12  --  1 07*          Blood Gas    Recent Labs     05/04/23  0114   PHART 7 433   FRQ3ALM 30 1*   PO2ART 96 4   LKP1YOF 19 7*   BEART -3 7   SOURCE Line, Arterial     Recent Labs     05/04/23  0114   SOURCE Line, Arterial    LFTs  Recent Labs     05/03/23  0833 05/03/23  1513   ALT 10 16   AST 14 31   ALKPHOS 32* 39   ALB 3 1* 3 3*   TBILI 0 74 0 84       Infectious  No recent results  Glucose  Recent Labs     05/03/23  0833 05/03/23  1513 05/03/23  1858 05/04/23  0113   GLUC 285* 279* 200* 153*               See Surgical Critical Care attending attestation for critical care time       Jammie Anders

## 2023-05-04 NOTE — ASSESSMENT & PLAN NOTE
Lab Results   Component Value Date    HGB 10 2 (L) 05/04/2023    HGB 11 0 (L) 05/03/2023    HGB 9 2 (L) 05/03/2023    HGB 10 5 (L) 05/03/2023    HGB 10 8 (L) 05/03/2023    HGB 10 9 (L) 05/03/2023     · 2/2 kidney capsule rupture in the setting of coagulopathy on home Xarelto and ASA in route to ER  · Reversed with KCentra and DDAVP  · Total Blood products to date  · PRBCs 4 units  · FFP 4 units  · Transfuse for HGB < 7 or hemodynamic instability  · Required levophed for short period of time in IR -- weaned off prior to completion of procedure  · Maintain MAP > 65 mmHg

## 2023-05-04 NOTE — ASSESSMENT & PLAN NOTE
Lab Results   Component Value Date    HGBA1C 5 9 (H) 04/20/2023       Recent Labs     05/03/23  1124 05/03/23  1847 05/03/23 2003 05/03/23  2359   POCGLU 256* 196* 174* 165*       Blood Sugar Average: Last 72 hrs:  (P) 225   · Holding home glimepiride and metformin for now  · Start accuchecks ACHS with SSI  · Currently n p o  as patient is intubated    · Goal blood glucose 140-180  · Avoid hypoglycemia  · Once able to take PO will need CHO controlled diet

## 2023-05-04 NOTE — ASSESSMENT & PLAN NOTE
· Home regimen: Xarelto and metoprolol  · Holding Xarelto for now, in the setting of hemorrhagic shock/kidney capsule rupture  · Continue telemetry  · Patient is now hemodynamically stable s/p volume resuscitation with blood products and IR procedure for embolization  Was on vasopressors for short period of time during volume resuscitation  No longer on vasopressors    · Continue metoprolol

## 2023-05-04 NOTE — CASE MANAGEMENT
Case Management Assessment & Discharge Planning Note    Patient name Gagan Ho  Location ICU 12/ICU 12 MRN [id number]  : 1950 Date 2023       Current Admission Date: 5/3/2023  Current Admission Diagnosis:Kidney capsule rupture, left, initial encounter   Patient Active Problem List    Diagnosis Date Noted   • Elevated troponin 2023   • Hemorrhagic shock ([facility] [address] ) 2023   • Coagulopathy ([facility] [address] ) 2023   • SHELIA (acute kidney injury) ([facility] [address] ) 2023   • Acute respiratory insufficiency 2023   • Kidney capsule rupture, left, initial encounter 2023   • Type 2 diabetes mellitus ([facility] [address] ) 2023   • CAD (coronary artery disease) 2023   • Seizure disorder ([facility] [address] ) 2023   • Leukemia ([facility] [address] ) 2023   • Splenic hemorrhage 2023   • Paroxysmal atrial fibrillation ([facility] [address] ) 2023      LOS (days): 1  Geometric Mean LOS (GMLOS) (days):   Days to GMLOS:     OBJECTIVE:    Risk of Unplanned Readmission Score: 23 53         Current admission status: Inpatient       Preferred Pharmacy:   [facility] [address]  [facility] [address]  Phone: [phone] Fax: [phone]    Primary Care Provider: Jose Parks DO    Primary Insurance: [facility]  Secondary Insurance:     ASSESSMENT:  Sudha Meléndez Proxies    There are no active Health Care Proxies on file  Patient Information  Admitted from[de-identified] Home  Mental Status: Intubated, Sedated  During Assessment patient was accompanied by: Spouse Lorraine Kussmaul)  Assessment information provided by[de-identified] Spouse  Primary Caregiver: Self  Support Systems: Self, Spouse/significant other, Daughter, Family members  [place] of Residence: [address] [place] [address] do you live in?: [facility] entry access options   Select all that apply : Stairs  Number of steps to enter home : 8  Type of Current Residence: Bi-level  Upon entering residence, is there a bedroom on the main floor (no further steps)?: Yes (additional 7 steps from main entrance)  Upon entering residence, is there a bathroom on the main floor (no further steps)?: Yes (additional 7 steps from main entrance)  Living Arrangements: Lives w/ Spouse/significant other, Lives w/ Extended Family (12 yr old grandson)    Activities of Daily Living Prior to Admission  Functional Status: Independent  Completes ADLs independently?: Yes  Ambulates independently?: Yes  Does patient use assisted devices?: No  Does patient currently own DME?: Yes  What DME does the patient currently own?: Straight Cane, Crutches  Does patient have a history of Outpatient Therapy (PT/OT)?: Yes (OP Cardiac Rehab)  Does the patient have a history of Short-Term Rehab?: No  Does patient have a history of HHC?: Yes  Does patient currently have [facility]?: No    Patient Information Continued  Income Source: Pension/intermediate  Does patient have prescription coverage?: Yes  Does patient receive dialysis treatments?: No  Does patient have a history of substance abuse?: No  Does patient have a history of Mental Health Diagnosis?: No    Means of Transportation  Means of Transport to Appts[de-identified] Drives Self  In the past 12 months, has lack of transportation kept you from medical appointments or from getting medications?: No  In the past 12 months, has lack of transportation kept you from meetings, work, or from getting things needed for daily living?: No  Was application for public transport provided?: N/A    DISCHARGE DETAILS:    Discharge planning discussed with[de-identified] pt's wifeJakob of Choice: Yes    Other Referral/Resources/Interventions Provided:  Interventions: Other (Specify)  Referral Comments: CM met with pt wife and introduced self/role with dcp  Pt fully independent PTA  Kenroy Keen aware PT/OT will work with pt once able  CM will f/u on recs

## 2023-05-04 NOTE — PLAN OF CARE
Problem: PHYSICAL THERAPY ADULT  Goal: Performs mobility at highest level of function for planned discharge setting  See evaluation for individualized goals  Description: Treatment/Interventions: Functional transfer training, LE strengthening/ROM, Therapeutic exercise, Cognitive reorientation, Patient/family training, Equipment eval/education, Bed mobility, Gait training, Spoke to nursing, Spoke to case management  Equipment Recommended: Regina Calix       See flowsheet documentation for full assessment, interventions and recommendations  Outcome: Progressing  Note: Prognosis: Fair  Problem List: Decreased strength, Decreased endurance, Impaired balance, Decreased mobility, Decreased skin integrity  Assessment: Pt is a 72 y o  male seen for PT evaluation s/p admit to [address] [place] [address] on 5/3/2023  Pt was admitted with a primary dx of: hemorrhagic shock, abdominal pain, renal hemorrhage, kidney capsule rupture  PT now consulted for assessment of mobility and d/c needs  Pt with Up with assistance orders  Pts current comorbidities and personal factors effecting treatment include: BMI, CAD, DM II, hx of leukemia  Pts current clinical presentation is Unstable/Unpredictable (high complexity) due to Ongoing medical management for primary dx, Increased reliance on more restrictive AD compared to baseline, Decreased activity tolerance compared to baseline, Fall risk, Increased assistance needed from caregiver at current time, Ongoing telemetry monitoring, Increased O2 via NC from pts baseline, s/p surgical intervention  Prior to admission, pt was independent without AD  Upon evaluation, pt currently is requiring MaxA for bed mobility; [address] x2 for transfers and [address] x2 for ambulation 4 ft w/ RW   Pt presents at PT eval functioning below baseline and currently w/ overall mobility deficits 2* to: BLE weakness, impaired balance, decreased endurance, impaired coordination, gait deviations, decreased activity tolerance compared to baseline, decreased functional mobility tolerance compared to baseline, fall risk, decreased skin integrity  Pt currently at a fall risk 2* to impairments listed above  Pt will continue to benefit from skilled acute PT interventions to address stated impairments; to maximize functional mobility; for ongoing pt/ family training; and DME needs  At conclusion of PT session chair alarm engaged, all needs in reach, RN notified of session findings/recommendations and pt returned back in recliner chair with phone and call bell within reach  Pt denies any further questions at this time  Recommend IP rehab upon hospital D/C  Barriers to Discharge: Inaccessible home environment  Barriers to Discharge Comments: 7+7 JESICA  PT Discharge Recommendation: Post acute rehabilitation services    See flowsheet documentation for full assessment

## 2023-05-04 NOTE — OCCUPATIONAL THERAPY NOTE
Occupational Therapy Evaluation      Laurent Chin II    5/4/2023    Principal Problem:    Kidney capsule rupture, left, initial encounter  Active Problems:    Type 2 diabetes mellitus (HCC)    CAD (coronary artery disease)    Seizure disorder (HCC)    Leukemia (HCC)    Splenic hemorrhage    Paroxysmal atrial fibrillation (HCC)    Elevated troponin    Hemorrhagic shock (HCC)    Coagulopathy (HCC)    SHELIA (acute kidney injury) ([facility] )    Acute respiratory insufficiency      Past Medical History:   Diagnosis Date    CAD (coronary artery disease)     DM (diabetes mellitus) ([facility] )     Leukemia ([facility] )     Seizure disorder ([facility] )        Past Surgical History:   Procedure Laterality Date    CORONARY ARTERY BYPASS GRAFT      IR EMBOLIZATION (SPECIFY VESSEL OR SITE)  5/3/2023        05/04/23 1240   OT Last Visit   OT Visit Date 05/04/23   Note Type   Note type Evaluation   Pain Assessment   Pain Assessment Tool 0-10   Pain Score No Pain   Restrictions/Precautions   Weight Bearing Precautions Per Order No   Braces or Orthoses Knee immobilizer  (noted it was removed by RN)   Other Precautions Chair Alarm; Bed Alarm;O2;Fall Risk;Multiple lines;Telemetry;Limb alert  (arterial line, potter)   Home Living   Type of Home House   Home Layout Multi-level  (split level home 7 steps to enter home and then an additional 7 steps to main level of home )   Bathroom Shower/Tub Tub/shower unit   Additional Comments no AD at baseline   Prior Function   Level of [place] Independent with ADLs; Independent with functional mobility   Lives With Spouse   Receives Help From Family   IADLs Independent with driving; Independent with meal prep; Independent with medication management   Falls in the last 6 months 0   Vocational Retired  ( for [employer])   Lifestyle   Autonomy PTA, pt (I) with all ADLs and IADLs   Intrinsic Gratification bowling   General   Family/Caregiver Present Yes   Additional General Comments Spouse, daughter, and grandson "present in room during evaluation  Subjective   Subjective \"I feel good  \"   ADL   Where Assessed Chair   Eating Assistance 7  Independent   Eating Deficit Beverage management   Grooming Assistance 7  Independent  (SEATED only )   UB Bathing Assistance Unable to assess   LB Bathing Assistance Unable to assess   UB Dressing Assistance 5  Supervision/Setup   UB Dressing Deficit Setup   LB Dressing Assistance 3  Moderate Assistance   LB Dressing Deficit Don/doff R sock; Don/doff L sock; Thread RLE into pants; Thread LLE into pants   Toileting Assistance  Unable to assess  (potter)   Bed Mobility   Additional Comments unable to assess bed mobility 2' pt OOB in chair upon OT arrival   Transfers   Sit to Stand 4  Minimal assistance  (CGA)   Additional items Assist x 1; Armrests   Stand to Sit 4  Minimal assistance  (CGA)   Additional items Assist x 1; Armrests   Additional Comments Pt standing x1m, no dizziness or lightheadedness  Balance   Static Sitting Fair   Dynamic Sitting Fair   Cognition   Arousal/Participation Alert; Cooperative   Attention Within functional limits   Orientation Level Oriented X4   Memory Within functional limits   Following Commands Follows one step commands without difficulty   Comments Pt verified ID by stating name and   Assessment   Limitation Decreased ADL status; Decreased endurance;Decreased high-level ADLs; Decreased self-care trans  (dec'd IADL, dec'd functional reach, dec'd standing tolearnce, generalized deconditioning,)   Prognosis Good   Goals   Patient Goals to go home, return to PLOF   LTG Time Frame 7-10   Long Term Goal #1 sdee goals listed below   Plan   Treatment Interventions ADL retraining;Functional transfer training;UE strengthening/ROM; Endurance training;Patient/family training;Equipment evaluation/education;Continued evaluation; Energy conservation; Activityengagement   Goal Expiration Date 23   Recommendation   OT Discharge Recommendation Post acute rehabilitation " services   AM-PAC Daily Activity Inpatient   Lower Body Dressing 2   Bathing 2   Toileting 2   Upper Body Dressing 3   Grooming 4   Eating 4   Daily Activity Raw Score 17   Daily Activity Standardized Score (Calc for Raw Score >=11) 37 26   AM-PAC Applied Cognition Inpatient   Following a Speech/Presentation 4   Understanding Ordinary Conversation 4   Taking Medications 4   Remembering Where Things Are Placed or Put Away 3   Remembering List of 4-5 Errands 3   Taking Care of Complicated Tasks 4   Applied Cognition Raw Score 22   Applied Cognition Standardized Score 47 83   End of Consult   Education Provided Family or social support of family present for education by provider;Yes   Patient Position at End of Consult Seated edge of bed; All needs within reach;Bed/Chair alarm activated   Nurse Communication Nurse aware of consult     Assessment  Pt is a 72 y o  male seen for OT evaluation s/p admission to [address] [place] [address] on 5/3/2023  with diagnoses Kidney capsule rupture, left, initial encounter  Pt has a significant PMH impacting occupational performance, which is listed above  PTA pt living with spouse in a bilevel home  Pt is motivated to return to home  Personal and environmental factors supporting pt at time of IE include age, social support, and attitude towards recovery  Personal and environmental factors inhibiting engagement in occupations include inaccessible home environment, inaccessible bathroom environment, difficulty completing ADLs, and difficulty completing IADLs  During evaluation pt performed as is outlined above in flowsheet  Pt required VC for safety and seated rest breaks  Noted pt with nasal canula not properly worn however SpO2 remained stable at 97% t/o session  Pt completed oral care (I)ly after items provided  Mod (A) needed for LB dressing and CGA/min (A) for sit<>Stand transfers on this date with RW   Standardized assessments used to assist in identifying performance deficits include [facility] 6-Clicks  Performance deficits that affect the pt’s occupational performance during the initial evaluation include impaired balance, functional mobility, endurance, activity tolerance, forward functional reach, functional standing tolerance, overall strength, functional sitting balance, and functional sitting tolerance, safety awareness, pacing of tasks, problem solving, and learning/remembering new tasks  Based on pt’s functional performance and deficits the following occupations will be addressed in OT treatments in order to maximize pt’s independence and overall occupational performance: bathing/showering, toileting and toilet hygiene, dressing, functional mobility, community mobility, meal preparation, household preparation, and leisure participation   Goals are listed below  Upon discharge from acute care setting recommend d/c to PAR vs home with [address] [place] [address] pending length of stay and progress made during that time, more likely PAR at this time  This evaluation required an extensive review of medical and/or therapy records and additional review of physical, cognitive and psychosocial history related to functional performance  Based upon functional performance deficits and assessments, this evaluation has been identified as a high complexity evaluation      GOALS:    Pt will achieve the following within specified time frame:  *Perform ADL transfers with mod (I) for inc'd independence with ADLs/purposeful tasks    *Bed mobility- mod (I) for inc'd independence to manage own comfort and initiate EOB & OOB purposeful tasks    *Perform UB ADL (I)ly for inc'd independence with self cares    *Perform LB ADL at mod (I) using AE prn for inc'd independence with self cares    *Increase static stand balance to good and dyn stand balance to good for inc'd safety with standing purposeful tasks    *Increase stand tolerance x5-7 m for inc'd tolerance with standing purposeful tasks    *Perform clothing management/hygiene for toileting mod (I)/(I) for inc'd independence with self cares    *Participate in 10m UE therex to increase overall stamina/activity tolerance for purposeful tasks    *Participate in further cognitive testing to assist with safe d/c planning    *Perform tub/shower transfer using most appropriate technique (step in vs  Tub seat/transfer bench) mod (I) for inc'd safety and independence with bathing    *Perform sinkside G&H mod (I), with good safety and Good balance for inc'd independence with self cares    *Pt will verbalize 2-3 energy conservation techniques to utilize in order to complete purposeful/ADL tasks safety, at highest level of performance and independence and with self report of dec'd fatigue      Quoc Wills, OT

## 2023-05-04 NOTE — PHYSICAL THERAPY NOTE
Physical Therapy Evaluation    Patient's Name: Cristian Jefferson II    Admitting Diagnosis  Hemorrhagic shock ([facility] ) [R57 8]  Abdominal pain [R10 9]  Renal hemorrhage, left [N28 89]  Kidney capsule rupture, left, initial encounter [S37 062A]    Problem List  Patient Active Problem List   Diagnosis    Kidney capsule rupture, left, initial encounter    Type 2 diabetes mellitus (HCC)    CAD (coronary artery disease)    Seizure disorder (HCC)    Leukemia (HCC)    Splenic hemorrhage    Paroxysmal atrial fibrillation (HCC)    Elevated troponin    Hemorrhagic shock (HCC)    Coagulopathy (HCC)    SHELIA (acute kidney injury) ([facility] )    Acute respiratory insufficiency       Past Medical History  Past Medical History:   Diagnosis Date    CAD (coronary artery disease)     DM (diabetes mellitus) ([facility] )     Leukemia ([facility] )     [facility] )        Past Surgical History  Past Surgical History:   Procedure Laterality Date    CORONARY ARTERY BYPASS GRAFT      IR EMBOLIZATION (SPECIFY VESSEL OR SITE)  5/3/2023      05/04/23 1141   PT Last Visit   PT Visit Date 05/04/23   Note Type   Note type Evaluation   Pain Assessment   Pain Assessment Tool 0-10   Pain Score No Pain   Restrictions/Precautions   Weight Bearing Precautions Per Order No   Braces or Orthoses Knee immobilizer  (removed by RN Martina Rater)   Other Precautions Chair Alarm; Bed Alarm;O2;Fall Risk;Multiple lines;Telemetry;Limb alert  (arterial line, potter)   Home Living   Type of Home House   Home Layout Multi-level  (split level, 7 JESICA + 7 stairs to lower floor or 7 stairs to main level)   Home Equipment   (wife has a cane, no other AD)   Additional Comments no AD at baseline   Prior Function   Level of [place] Independent with ADLs; Independent with functional mobility   Lives With VANGIE Daugherty 89 in the last 6 months 0   General   Family/Caregiver Present Yes  (pt wife)   Cognition   Orientation Level Oriented to person;Oriented to place;Oriented to time;Disoriented to situation   Following Commands Follows one step commands with increased time or repetition   Comments pt ID by wristband, name and    Subjective   Subjective pt supine in bed, recently extubated, wife in room, agreeable to PT eval   RLE Assessment   RLE Assessment WFL   LLE Assessment   LLE Assessment WFL   Bed Mobility   Supine to Sit 2  Maximal assistance   Additional items Assist x 1; Increased time required;LE management;HOB elevated  (trunk management)   Sit to Supine Unable to assess   Transfers   Sit to Stand 3  Moderate assistance   Additional items Assist x 2; Increased time required; Bedrails   Stand to Sit 3  Moderate assistance   Additional items Assist x 2; Increased time required;Verbal cues;Armrests   Stand pivot 3  Moderate assistance   Additional items Assist x 2; Increased time required;Verbal cues   Ambulation/Elevation   Gait pattern Poor UE support; Improper Weight shift; Antalgic; Short stride   Gait Assistance 3  Moderate assist   Additional items Assist x 2   Assistive Device Rolling walker   Distance 4'x 1   Stair Management Assistance Not tested   Balance   Static Sitting Fair   Dynamic Sitting Fair   Static Standing Zero  (Ax2)   Ambulatory Zero  (Ax2)   Activity Tolerance   Activity Tolerance Patient limited by fatigue   Nurse Made Aware PANCHO martinez in room as x2 assist   Assessment   Prognosis Fair   Problem List Decreased strength;Decreased endurance; Impaired balance;Decreased mobility; Decreased skin integrity   Assessment Pt is a 72 y o  male seen for PT evaluation s/p admit to [facility] on 5/3/2023  Pt was admitted with a primary dx of: hemorrhagic shock, abdominal pain, renal hemorrhage, kidney capsule rupture  PT now consulted for assessment of mobility and d/c needs  Pt with Up with assistance orders  Pts current comorbidities and personal factors effecting treatment include: BMI, CAD, DM II, hx of leukemia   Pts current clinical presentation is Unstable/Unpredictable (high complexity) due to Ongoing medical management for primary dx, Increased reliance on more restrictive AD compared to baseline, Decreased activity tolerance compared to baseline, Fall risk, Increased assistance needed from caregiver at current time, Ongoing telemetry monitoring, Increased O2 via NC from pts baseline, s/p surgical intervention  Prior to admission, pt was independent without AD  Upon evaluation, pt currently is requiring MaxA for bed mobility; [address] x2 for transfers and [address] x2 for ambulation 4 ft w/ RW  Pt presents at PT eval functioning below baseline and currently w/ overall mobility deficits 2* to: BLE weakness, impaired balance, decreased endurance, impaired coordination, gait deviations, decreased activity tolerance compared to baseline, decreased functional mobility tolerance compared to baseline, fall risk, decreased skin integrity  Pt currently at a fall risk 2* to impairments listed above  Pt will continue to benefit from skilled acute PT interventions to address stated impairments; to maximize functional mobility; for ongoing pt/ family training; and DME needs  At conclusion of PT session chair alarm engaged, all needs in reach, RN notified of session findings/recommendations and pt returned back in recliner chair with phone and call bell within reach  Pt denies any further questions at this time  Recommend IP rehab upon hospital D/C  Barriers to Discharge Inaccessible home environment   Barriers to Discharge Comments 7+7 JESICA   Goals   Patient Goals none stated by pt   STG Expiration Date 05/14/23   Short Term Goal #1 In 10 days pt will be able to: 1  Demonstrate ability to perform all aspects of bed mobility independently to improve functional safety  2  Perform functional transfers independently to facilitate safe return to previous living environment    3   Ambulate 150 ft with LRAD and supervision with stable vitals to improve safety with household distances and reduce fall risk  4  Improve LE strength grades by 1 to increase ease of functional mobility with transfers and gait  5  Pt will demonstrate improved balance by one grade in order to decrease risk of falls  6  Climb 7 steps with 1 HR with Sandeep to simulate entrance to home  PT Treatment Day 0   Plan   Treatment/Interventions Functional transfer training;LE strengthening/ROM; Therapeutic exercise;Cognitive reorientation;Patient/family training;Equipment eval/education; Bed mobility;Gait training;Spoke to nursing;Spoke to case management   PT Frequency 3-5x/wk   Recommendation   PT Discharge Recommendation Post acute rehabilitation services   Equipment Recommended [facility] Recommended Wheeled walker   Additional Comments antcipate progression to home with either HHPT or OPPT with progression of OOB mobility and ability to perform stairs with at most min assistance   AM-PAC Basic Mobility Inpatient   Turning in Flat Bed Without Bedrails 2   Lying on Back to Sitting on Edge of Flat Bed Without Bedrails 2   Moving Bed to Chair 1   Standing Up From Chair Using Arms 1   Walk in Room 1   Climb 3-5 Stairs With Railing 1   Basic Mobility Inpatient Raw Score 8   Turning Head Towards Sound 3   Follow Simple Instructions 3   Low Function Basic Mobility Raw Score  14   Low Function Basic Mobility Standardized Score  22 01   Highest Level Of Mobility   -HLM Goal 3: Sit at edge of bed   JH-HLM Achieved 4: Move to chair/commode   End of Consult   Patient Position at End of Consult Bed/Chair alarm activated; All needs within reach; Bedside chair  (with PANCHO Pandey)   The patient's AM-PAC Basic Mobility Inpatient Short Form Raw Score is 8  A Raw score of less than or equal to 16 suggests the patient may benefit from discharge to post-acute rehabilitation services  Please also refer to the recommendation of the Physical Therapist for safe discharge planning              Colonel Paz, PT

## 2023-05-04 NOTE — TELEMEDICINE
e-Consult (IPC)  - Interventional Radiology  Germaine Nissen II 72 y o  male MRN: [id number]  Unit/Bed#: ICU 12 Encounter: [id number]          Interventional Radiology has been consulted to evaluate Neeru Amadoren II    We were consulted by surgery service concerning this patient with renal bleed  Inpatient Consult to IR  Consult performed by: Rahul Olmedo MD  Consult ordered by: KIANNA Zheng        05/04/23    Assessment/Recommendation:   72year-old male with history of spontaneous left renal bleed, spontaneous left renal bleed, s/p embolization early 5/3, now with continued bleeding with hypotension and requiring blood products  Patient has had embolization of his left renal artery with improvement in blood pressure following procedure  Please contact IR if there are any further concerns of bleeding  >5 min, >50% time spent reviewing/analyzing record, written report will be generated in the EMR  Thank you for allowing Interventional Radiology to participate in the care of Neeru Nissen II  Please don't hesitate to call or TigerText us with any questions       Rahul Olmedo MD

## 2023-05-04 NOTE — ASSESSMENT & PLAN NOTE
· Intubated for 2nd IR procedure  · Patient returned to ICU intubated    · Sedation with propofol -- titrate for goal RASS 0 to -1  · Vent bundle ordered  · SAT/SBT in am  · ACVC 14/450/40/6  · Titrate FiO2 for SpO2 > 92%  · Plan for possible extubation today

## 2023-05-04 NOTE — ASSESSMENT & PLAN NOTE
· INR 4 33 on arrival -- likely falsely elevated 2/2 Xarelto    Also received ASA by EMS prior to arrival   · Reversed on admission with [facility] and DDAVP  · Holding AC/AP due to hemorrhagic shock  · INR most recently 1 67  · Hgb stable

## 2023-05-04 NOTE — ASSESSMENT & PLAN NOTE
· hsTn  187 > 753 > 1150 > 1981 > 2316  · Developed CP in the setting of hemorrhagic shock  Patient with history of CAD and previous CABG x2 in 2019  · EKG initially with ST depressions / T wave inversions in anterolateral leads and new BBB  · EKG now with improved depressions, resolved BBB    T wave inversions in anterolateral leads remain  · Cardiology consulted  · Echo  · Unable to heparinize in the setting of hemorrhagic shock  · Continue statin  · If hgb remains stable start low dose aspirin  · Random hsTn this am pending, continue to trend

## 2023-05-04 NOTE — PLAN OF CARE
Problem: OCCUPATIONAL THERAPY ADULT  Goal: Performs self-care activities at highest level of function for planned discharge setting  See evaluation for individualized goals  Description: Treatment Interventions: ADL retraining, Functional transfer training, UE strengthening/ROM, Endurance training, Patient/family training, Equipment evaluation/education, Continued evaluation, Energy conservation, Activityengagement          See flowsheet documentation for full assessment, interventions and recommendations  Outcome: Progressing  Note: Limitation: Decreased ADL status, Decreased endurance, Decreased high-level ADLs, Decreased self-care trans (dec'd IADL, dec'd functional reach, dec'd standing tolearnce, generalized deconditioning,)  Prognosis: Good  Assessment: Pt is a 72 y o  male seen for OT evaluation s/p admission to [address] [place] [address] on 5/3/2023  with diagnoses Kidney capsule rupture, left, initial encounter  Pt has a significant PMH impacting occupational performance, which is listed above  PTA pt living with spouse in a bilevel home  Pt is motivated to return to home  Personal and environmental factors supporting pt at time of IE include age, social support, and attitude towards recovery  Personal and environmental factors inhibiting engagement in occupations include inaccessible home environment, inaccessible bathroom environment, difficulty completing ADLs, and difficulty completing IADLs  During evaluation pt performed as is outlined above in flowsheet  Pt required VC for safety and seated rest breaks  Noted pt with nasal canula not properly worn however SpO2 remained stable at 97% t/o session  Pt completed oral care (I)ly after items provided  Mod (A) needed for LB dressing and CGA/min (A) for sit<>Stand transfers on this date with RW  Standardized assessments used to assist in identifying performance deficits include [facility] 6-Clicks   Performance deficits that affect the pt’s occupational performance during the initial evaluation include impaired balance, functional mobility, endurance, activity tolerance, forward functional reach, functional standing tolerance, overall strength, functional sitting balance, and functional sitting tolerance, safety awareness, pacing of tasks, problem solving, and learning/remembering new tasks  Based on pt’s functional performance and deficits the following occupations will be addressed in OT treatments in order to maximize pt’s independence and overall occupational performance: bathing/showering, toileting and toilet hygiene, dressing, functional mobility, community mobility, meal preparation, household preparation, and leisure participation   Goals are listed below  Upon discharge from acute care setting recommend d/c to PAR vs home with [address] [place] [address] pending length of stay and progress made during that time, more likely PAR at this time  This evaluation required an extensive review of medical and/or therapy records and additional review of physical, cognitive and psychosocial history related to functional performance  Based upon functional performance deficits and assessments, this evaluation has been identified as a high complexity evaluation       OT Discharge Recommendation: Post acute rehabilitation services

## 2023-05-04 NOTE — ASSESSMENT & PLAN NOTE
Lab Results   Component Value Date    CREATININE 2 40 (H) 05/04/2023    CREATININE 2 28 (H) 05/03/2023    CREATININE 2 41 (H) 05/03/2023       · 2/2 hemorrhagic shock, contrast exposure, left kidney capsule rupture  · Baseline creatinine 0 9, on admission 1 18  · Continue IVF  · Maintain 3 way potter  · Monitor UO and renal indices  · Avoid hypotension, nephrotoxins no

## 2023-05-05 PROBLEM — R06.89 ACUTE RESPIRATORY INSUFFICIENCY: Status: RESOLVED | Noted: 2023-05-04 | Resolved: 2023-05-05

## 2023-05-05 PROBLEM — R57.8 HEMORRHAGIC SHOCK (HCC): Status: RESOLVED | Noted: 2023-05-04 | Resolved: 2023-05-05

## 2023-05-05 PROBLEM — D62 ACUTE BLOOD LOSS ANEMIA: Status: ACTIVE | Noted: 2023-05-05

## 2023-05-05 LAB
ABO GROUP BLD BPU: NORMAL
ABO GROUP BLD BPU: NORMAL
ANION GAP SERPL CALCULATED.3IONS-SCNC: 7 MMOL/L (ref 4–13)
BASE EXCESS BLDA CALC-SCNC: -9 MMOL/L (ref -2–3)
BPU ID: NORMAL
BPU ID: NORMAL
BUN SERPL-MCNC: 41 MG/DL (ref 5–25)
CA-I BLD-SCNC: 1.06 MMOL/L (ref 1.12–1.32)
CA-I BLD-SCNC: 1.44 MMOL/L (ref 1.12–1.32)
CALCIUM SERPL-MCNC: 8 MG/DL (ref 8.4–10.2)
CHLORIDE SERPL-SCNC: 107 MMOL/L (ref 96–108)
CO2 SERPL-SCNC: 23 MMOL/L (ref 21–32)
CREAT SERPL-MCNC: 2.39 MG/DL (ref 0.6–1.3)
CROSSMATCH: NORMAL
CROSSMATCH: NORMAL
ERYTHROCYTE [DISTWIDTH] IN BLOOD BY AUTOMATED COUNT: 16.3 % (ref 11.6–15.1)
GFR SERPL CREATININE-BSD FRML MDRD: 26 ML/MIN/1.73SQ M
GLUCOSE SERPL-MCNC: 144 MG/DL (ref 65–140)
GLUCOSE SERPL-MCNC: 144 MG/DL (ref 65–140)
GLUCOSE SERPL-MCNC: 156 MG/DL (ref 65–140)
GLUCOSE SERPL-MCNC: 159 MG/DL (ref 65–140)
GLUCOSE SERPL-MCNC: 159 MG/DL (ref 65–140)
GLUCOSE SERPL-MCNC: 236 MG/DL (ref 65–140)
HCO3 BLDA-SCNC: 19.7 MMOL/L (ref 22–28)
HCT VFR BLD AUTO: 24.1 % (ref 36.5–49.3)
HCT VFR BLD AUTO: 24.4 % (ref 36.5–49.3)
HCT VFR BLD AUTO: 24.8 % (ref 36.5–49.3)
HCT VFR BLD CALC: 26 % (ref 36.5–49.3)
HGB BLD-MCNC: 8.4 G/DL (ref 12–17)
HGB BLD-MCNC: 8.5 G/DL (ref 12–17)
HGB BLD-MCNC: 8.6 G/DL (ref 12–17)
HGB BLDA-MCNC: 8.8 G/DL (ref 12–17)
MAGNESIUM SERPL-MCNC: 2.3 MG/DL (ref 1.9–2.7)
MCH RBC QN AUTO: 31.7 PG (ref 26.8–34.3)
MCHC RBC AUTO-ENTMCNC: 34.9 G/DL (ref 31.4–37.4)
MCV RBC AUTO: 91 FL (ref 82–98)
PCO2 BLD: 22 MMOL/L (ref 21–32)
PCO2 BLD: 59.7 MM HG (ref 36–44)
PH BLD: 7.13 [PH] (ref 7.35–7.45)
PHOSPHATE SERPL-MCNC: 4.6 MG/DL (ref 2.3–4.1)
PLATELET # BLD AUTO: 116 THOUSANDS/UL (ref 149–390)
PMV BLD AUTO: 10.4 FL (ref 8.9–12.7)
PO2 BLD: 358 MM HG (ref 75–129)
POTASSIUM BLD-SCNC: 4.9 MMOL/L (ref 3.5–5.3)
POTASSIUM SERPL-SCNC: 4.4 MMOL/L (ref 3.5–5.3)
RBC # BLD AUTO: 2.65 MILLION/UL (ref 3.88–5.62)
SAO2 % BLD FROM PO2: 100 % (ref 60–85)
SODIUM BLD-SCNC: 139 MMOL/L (ref 136–145)
SODIUM SERPL-SCNC: 137 MMOL/L (ref 135–147)
SPECIMEN SOURCE: ABNORMAL
UNIT DISPENSE STATUS: NORMAL
UNIT DISPENSE STATUS: NORMAL
UNIT PRODUCT CODE: NORMAL
UNIT PRODUCT CODE: NORMAL
UNIT PRODUCT VOLUME: 350 ML
UNIT PRODUCT VOLUME: 350 ML
UNIT RH: NORMAL
UNIT RH: NORMAL
WBC # BLD AUTO: 21.05 THOUSAND/UL (ref 4.31–10.16)

## 2023-05-05 RX ORDER — FUROSEMIDE 10 MG/ML
20 INJECTION INTRAMUSCULAR; INTRAVENOUS ONCE
Status: COMPLETED | OUTPATIENT
Start: 2023-05-05 | End: 2023-05-05

## 2023-05-05 RX ORDER — LEVALBUTEROL INHALATION SOLUTION 0.63 MG/3ML
0.63 SOLUTION RESPIRATORY (INHALATION) EVERY 8 HOURS PRN
Status: DISCONTINUED | OUTPATIENT
Start: 2023-05-05 | End: 2023-05-09

## 2023-05-05 RX ADMIN — INSULIN LISPRO 1 UNITS: 100 INJECTION, SOLUTION INTRAVENOUS; SUBCUTANEOUS at 05:56

## 2023-05-05 RX ADMIN — ACETAMINOPHEN 975 MG: 325 TABLET ORAL at 13:54

## 2023-05-05 RX ADMIN — PANTOPRAZOLE SODIUM 40 MG: 40 INJECTION, POWDER, FOR SOLUTION INTRAVENOUS at 20:22

## 2023-05-05 RX ADMIN — PANTOPRAZOLE SODIUM 40 MG: 40 INJECTION, POWDER, FOR SOLUTION INTRAVENOUS at 08:35

## 2023-05-05 RX ADMIN — ACETAMINOPHEN 975 MG: 325 TABLET ORAL at 05:54

## 2023-05-05 RX ADMIN — LACOSAMIDE 100 MG: 100 TABLET, FILM COATED ORAL at 20:21

## 2023-05-05 RX ADMIN — FUROSEMIDE 20 MG: 10 INJECTION, SOLUTION INTRAMUSCULAR; INTRAVENOUS at 08:35

## 2023-05-05 RX ADMIN — LIDOCAINE 5% 1 PATCH: 700 PATCH TOPICAL at 08:37

## 2023-05-05 RX ADMIN — METHOCARBAMOL TABLETS 500 MG: 500 TABLET, COATED ORAL at 05:54

## 2023-05-05 RX ADMIN — LEVALBUTEROL HYDROCHLORIDE 0.63 MG: 0.63 SOLUTION RESPIRATORY (INHALATION) at 09:33

## 2023-05-05 RX ADMIN — DOCUSATE SODIUM 100 MG: 100 CAPSULE, LIQUID FILLED ORAL at 08:35

## 2023-05-05 RX ADMIN — FUROSEMIDE 20 MG: 10 INJECTION, SOLUTION INTRAMUSCULAR; INTRAVENOUS at 20:22

## 2023-05-05 RX ADMIN — EZETIMIBE 10 MG: 10 TABLET ORAL at 17:09

## 2023-05-05 RX ADMIN — METOPROLOL TARTRATE 25 MG: 25 TABLET, FILM COATED ORAL at 08:35

## 2023-05-05 RX ADMIN — FUROSEMIDE 20 MG: 10 INJECTION, SOLUTION INTRAMUSCULAR; INTRAVENOUS at 13:55

## 2023-05-05 RX ADMIN — LACOSAMIDE 100 MG: 100 TABLET, FILM COATED ORAL at 08:35

## 2023-05-05 RX ADMIN — DOCUSATE SODIUM 100 MG: 100 CAPSULE, LIQUID FILLED ORAL at 17:09

## 2023-05-05 RX ADMIN — METOPROLOL TARTRATE 25 MG: 25 TABLET, FILM COATED ORAL at 20:21

## 2023-05-05 RX ADMIN — METHOCARBAMOL TABLETS 500 MG: 500 TABLET, COATED ORAL at 13:55

## 2023-05-05 RX ADMIN — PRAVASTATIN SODIUM 80 MG: 80 TABLET ORAL at 17:09

## 2023-05-05 NOTE — SEPSIS NOTE
Sepsis Note   Lorena Lanza II 72 y o  male MRN: [id number]  Unit/Bed#: ICU 12 Encounter: [id number]    WBC 21  Temp 99 6  RR 30's  's sinus tachycardia  No suspected source  BCx drawn earlier in admission, NGTD, ABX discontinued 5/4  No need for IVF as pt is being diuresed     Initial Sepsis Screening     Row Name 05/05/23 2107                Is the patient's history suggestive of a new or worsening infection? No  -TL              User Key  (r) = Recorded By, (t) = Taken By, (c) = Cosigned By    [address] Name Provider Type    NASREEN Guillaume, KIANNA Nurse Practitioner                Body mass index is 39 22 kg/m²    Wt Readings from Last 1 Encounters:   05/05/23 124 kg (273 lb 5 9 oz)     IBW (Ideal Body Weight): 73 kg    Ideal body weight: 73 kg (160 lb 15 oz)  Adjusted ideal body weight: 93 4 kg (205 lb 14 6 oz)

## 2023-05-05 NOTE — ASSESSMENT & PLAN NOTE
Lab Results   Component Value Date    HGB 8 5 (L) 05/05/2023    HGB 9 6 (L) 05/04/2023    HGB 10 4 (L) 05/04/2023    HGB 10 1 (L) 05/04/2023       · 2/2 kidney capsule rupture in the setting of coagulopathy on home Xarelto and ASA in route to ER  · Reversed with KCentra and DDAVP  · Total Blood products to date  · PRBCs 4 units  · FFP 4 units  · Transfuse for HGB < 7 or hemodynamic instability  · Required levophed for short period of time in IR -- weaned off prior to completion of procedure  · Maintain MAP > 65 mmHg

## 2023-05-05 NOTE — ASSESSMENT & PLAN NOTE
RESOLVED: patient extubated  · Intubated for 2nd IR procedure  · Patient returned to ICU intubated    · Sedation with propofol -- titrate for goal RASS 0 to -1  · Vent bundle ordered  · SAT/SBT in am  · ACVC 14/450/40/6  · Titrate FiO2 for SpO2 > 92%

## 2023-05-05 NOTE — ASSESSMENT & PLAN NOTE
"· Noted on CT as above  Per radiology: \"There is vague hypodensity within the superior spleen measuring up to 4 cm  In addition there is a 1 9 cm hypodensity in the superior spleen  There are small foci of hyperdensity within the superior aspect of the spleen  There is perisplenic hemorrhage  \"  · IR consulted as above    No evidence of flank or ABD bruising    Plan:  · Monitor ABD exams  · Transfuse as above    "

## 2023-05-05 NOTE — PROGRESS NOTES
Progress Note - Urology  Ioana Hayden II 1950, 72 y o  male MRN: [id number]    Unit/Bed#: ICU 12 Encounter: [id number]        Assessment & Plan:  1  Kidney capsule rupture, left  - Patient with abrupt onset left flank pain, nontraumatic, on Xarelto presenting to the ED 5/03   - CT on admission showing large subcapsular hematoma compressing the left kidney measuring 13 5 x 15 8 x 9 7 cm and up to 6 2 cm in thickness  Surrounding perinephric and retroperitoneal hemorrhage   - Status post IR embolization 5/03  After lower pole embolized, patient redeveloped shock  Returned to IR for complete L artery embolization  -Huggins in place draining clear yellow urine  Once patient medically stable can DC catheter per primary team   -Creatinine 2 39-hemorrhagic shock, contrast exposure, left kidney capsule rupture  - Hgb 8 6 today, 10 4 yesterday  - ICU diuresing pt  - Continue with serial H&H  - Hold AC  - Spontaneous kidney Rupture etiologies include anticoagulation, trauma, renal tumor, Vascular Malformations, ruptured Hemorrhagic Cyst   - Discussed with patient and wife at bedside patient will require MRI as an outpatient likely in 6 weeks for further work-up for malignancy  Patient reports patient has established urologist with [facility], may follow-up with patient  Our office will call to ensure patient has follow-up  - Urology will sign off at this time  Please reconsult as necessary  Subjective:   HPI: Patient seen at bedside  Reports minimal abdominal pain  Review of Systems   Constitutional: Positive for fever  Negative for chills  Respiratory: Positive for cough  Negative for shortness of breath  Cardiovascular: Negative for chest pain and palpitations  Gastrointestinal: Positive for abdominal pain  Negative for vomiting  Genitourinary: Negative for dysuria and hematuria  Musculoskeletal: Negative for arthralgias and back pain  Skin: Negative for color change and rash     Neurological: "Negative for seizures and syncope  All other systems reviewed and are negative  Objective:    Vitals: Blood pressure 142/78, pulse (!) 110, temperature (!) 100 7 °F (38 2 °C), temperature source Axillary, resp  rate (!) 37, height 5' 10\" (1 778 m), weight 124 kg (273 lb 5 9 oz), SpO2 98 %  ,Body mass index is 39 22 kg/m²  Physical Exam  Constitutional:       General: He is not in acute distress  Appearance: Normal appearance  He is normal weight  He is ill-appearing  He is not toxic-appearing  HENT:      Head: Normocephalic and atraumatic  Right Ear: External ear normal       Left Ear: External ear normal       Nose: Nose normal       Mouth/Throat:      Mouth: Mucous membranes are moist    Eyes:      General: No scleral icterus  Conjunctiva/sclera: Conjunctivae normal    Cardiovascular:      Rate and Rhythm: Tachycardia present  Pulmonary:      Comments: Tachypnea  Abdominal:      General: Abdomen is flat  There is distension  Palpations: Abdomen is soft  Tenderness: There is no abdominal tenderness  There is no guarding  Musculoskeletal:         General: Normal range of motion  Skin:     General: Skin is warm  Neurological:      General: No focal deficit present  Mental Status: He is alert and oriented to person, place, and time  Mental status is at baseline  Psychiatric:         Mood and Affect: Mood normal          Behavior: Behavior normal          Thought Content:  Thought content normal          Judgment: Judgment normal          Labs:  Recent Labs     05/03/23  0130 05/03/23  0550 05/03/23  0640 05/03/23  0833 05/03/23  1858 05/04/23  0113 05/04/23  0435 05/05/23  0704   WBC 15 69* 29 35* 34 02* 34 32* 22 63* 19 02* 19 26* 21 05*     Recent Labs     05/03/23  1524 05/03/23  1717 05/03/23  1858 05/04/23  0113 05/04/23  0435 05/04/23  1215 05/04/23  1733 05/05/23  0036 05/05/23  0704 05/05/23  1225   HGB 9 2* 8 8* 11 0* 10 2* 10 1* 10 4* 9 6* 8 5* 8 4* 8 6* " Recent Labs     05/03/23  1524 05/03/23  1717 05/03/23  1858 05/04/23  0113 05/04/23  0435 05/04/23  1215 05/04/23  1733 05/05/23  0036 05/05/23  0704 05/05/23  1225   HCT 27* 26* 32 5* 28 5* 28 6* 29 5* 27 6* 24 4* 24 1* 24 8*     Recent Labs     05/03/23  0130 05/03/23  0640 05/03/23  0833 05/03/23  1513 05/03/23  1858 05/04/23  0113 05/04/23  0435 05/05/23  0704   CREATININE 1 18 1 31* 1 41* 2 41* 2 28* 2 40* 2 42* 2 39*         History:  Past Medical History:   Diagnosis Date   • CAD (coronary artery disease)    • DM (diabetes mellitus) ([place] [address] )    • Leukemia ([place] [address] )    • Seizure disorder ([place] [address] )      Social History     Socioeconomic History   • Marital status: /Civil Union     Spouse name: None   • Number of children: None   • Years of education: None   • Highest education level: None   Occupational History   • None   Tobacco Use   • Smoking status: None   • Smokeless tobacco: None   Substance and Sexual Activity   • Alcohol use: None   • Drug use: None   • Sexual activity: None   Other Topics Concern   • None   Social History Narrative   • None     Social Determinants of Health     Financial Resource Strain: Not on file   Food Insecurity: Not on file   Transportation Needs: No Transportation Needs   • Lack of Transportation (Medical): No   • Lack of Transportation (Non-Medical): No   Physical Activity: Not on file   Stress: Not on file   Social Connections: Not on file   Intimate Partner Violence: Not on file   Housing Stability: Not on file     Past Surgical History:   Procedure Laterality Date   • CORONARY ARTERY BYPASS GRAFT     • IR EMBOLIZATION (SPECIFY VESSEL OR SITE)  5/3/2023   • IR EMBOLIZATION (SPECIFY VESSEL OR SITE)  5/3/2023     No family history on file      Pilo Cosby PA-C  Date: 5/5/2023 Time: 2:05 PM

## 2023-05-05 NOTE — PLAN OF CARE
Problem: PHYSICAL THERAPY ADULT  Goal: Performs mobility at highest level of function for planned discharge setting  See evaluation for individualized goals  Description: Treatment/Interventions: Functional transfer training, LE strengthening/ROM, Therapeutic exercise, Cognitive reorientation, Patient/family training, Equipment eval/education, Bed mobility, Gait training, Spoke to nursing, Spoke to case management  Equipment Recommended: Mechelle Calle       See flowsheet documentation for full assessment, interventions and recommendations  5/5/2023 1459 by Kala Barakat, PT  Outcome: Not Progressing  Note: Prognosis: Guarded  Problem List: Decreased strength, Decreased endurance  Assessment: Pt with decreased activity tolerance as compared to yesterday, currently limited by medical status  Continues to require max x 1 assist for bed mobility  Did demonstrate ability to sit edge of bed for about 2 minutes, varying levels of assit from CGA to moderate for sitting balance  Continue to reccomend post acute rehabilitation upon discharge  Barriers to Discharge: Inaccessible home environment  Barriers to Discharge Comments: 7+7 JESICA  PT Discharge Recommendation: Post acute rehabilitation services    See flowsheet documentation for full assessment

## 2023-05-05 NOTE — PLAN OF CARE
Problem: MOBILITY - ADULT  Goal: Maintain or return to baseline ADL function  Description: INTERVENTIONS:  -  Assess patient's ability to carry out ADLs; assess patient's baseline for ADL function and identify physical deficits which impact ability to perform ADLs (bathing, care of mouth/teeth, toileting, grooming, dressing, etc )  - Assess/evaluate cause of self-care deficits   - Assess range of motion  - Assess patient's mobility; develop plan if impaired  - Assess patient's need for assistive devices and provide as appropriate  - Encourage maximum independence but intervene and supervise when necessary  - Involve family in performance of ADLs  - Assess for home care needs following discharge   - Consider OT consult to assist with ADL evaluation and planning for discharge  - Provide patient education as appropriate  Outcome: Progressing  Goal: Maintains/Returns to pre admission functional level  Description: INTERVENTIONS:  - Perform BMAT or MOVE assessment daily    - Set and communicate daily mobility goal to care team and patient/family/caregiver  - Collaborate with rehabilitation services on mobility goals if consulted  - Perform Range of Motion 3 times a day  - Reposition patient every 2 hours  - Dangle patient 3 times a day  - Stand patient 3 times a day  - Ambulate patient 3 times a day  - Out of bed to chair 3 times a day   - Out of bed for meals 3 times a day  - Out of bed for toileting  - Record patient progress and toleration of activity level   Outcome: Progressing     Problem: Nutrition/Hydration-ADULT  Goal: Nutrient/Hydration intake appropriate for improving, restoring or maintaining nutritional needs  Description: Monitor and assess patient's nutrition/hydration status for malnutrition  Collaborate with interdisciplinary team and initiate plan and interventions as ordered  Monitor patient's weight and dietary intake as ordered or per policy   Utilize nutrition screening tool and intervene as necessary  Determine patient's food preferences and provide high-protein, high-caloric foods as appropriate       INTERVENTIONS:  - Monitor oral intake, urinary output, labs, and treatment plans  - Assess nutrition and hydration status and recommend course of action  - Evaluate amount of meals eaten  - Assist patient with eating if necessary   - Allow adequate time for meals  - Recommend/ encourage appropriate diets, oral nutritional supplements, and vitamin/mineral supplements  - Order, calculate, and assess calorie counts as needed  - Recommend, monitor, and adjust tube feedings and TPN/PPN based on assessed needs  - Assess need for intravenous fluids  - Provide specific nutrition/hydration education as appropriate  - Include patient/family/caregiver in decisions related to nutrition  Outcome: Progressing     Problem: Prexisting or High Potential for Compromised Skin Integrity  Goal: Skin integrity is maintained or improved  Description: INTERVENTIONS:  - Identify patients at risk for skin breakdown  - Assess and monitor skin integrity  - Assess and monitor nutrition and hydration status  - Monitor labs   - Assess for incontinence   - Turn and reposition patient  - Assist with mobility/ambulation  - Relieve pressure over bony prominences  - Avoid friction and shearing  - Provide appropriate hygiene as needed including keeping skin clean and dry  - Evaluate need for skin moisturizer/barrier cream  - Collaborate with interdisciplinary team   - Patient/family teaching  - Consider wound care consult   Outcome: Progressing     Problem: SAFETY,RESTRAINT: NV/NON-SELF DESTRUCTIVE BEHAVIOR  Goal: Remains free of harm/injury (restraint for non violent/non self-detsructive behavior)  Description: INTERVENTIONS:  - Instruct patient/family regarding restraint use   - Assess and monitor physiologic and psychological status   - Provide interventions and comfort measures to meet assessed patient needs   - Identify and implement measures to help patient regain control  - Assess readiness for release of restraint   Outcome: Progressing  Goal: Returns to optimal restraint-free functioning  Description: INTERVENTIONS:  - Assess the patient's behavior and symptoms that indicate continued need for restraint  - Identify and implement measures to help patient regain control  - Assess readiness for release of restraint   Outcome: Progressing

## 2023-05-05 NOTE — PROGRESS NOTES
0830: Patient is alert and oriented; complaints of 2/10 middle lower back pain  Lidocaine patch scheduled  Patient +4 upper extremity strength with tremor and L eye tunnel vision; baseline per wife and patient  Pt tachyenic with noticeable MOFFETT; lasix and xopenex ordered by provider  Expiratory wheezes heard in upper lobes  R radial A-line rezero'd  NIBP significantly lower (142/78 vs 170/76)  Abdomen firm and slightly distended  Wife at bedside; attentive to patient and participating in care  Call bell within reach  1230: Patient with ongoing temp, tachypnea, and WBC 21  Speech now slightly garbled  TL,CRNP aware  1240: Report given to PANCHO Locke

## 2023-05-05 NOTE — ASSESSMENT & PLAN NOTE
· Home regimen: Xarelto and metoprolol  · Holding Xarelto for now, in the setting of hemorrhagic shock/kidney capsule rupture  · Continue telemetry  · Patient is now hemodynamically stable s/p volume resuscitation with blood products and IR procedure for embolization  Was on vasopressors for short period of time during volume resuscitation  No longer on vasopressors    · Continue metoprolol  · Holding AC/AP 2/2 ABLA with shock on admission in the setting of renal capsule rupture

## 2023-05-05 NOTE — ASSESSMENT & PLAN NOTE
· INR 4 33 on arrival -- likely falsely elevated 2/2 Xarelto    Also received ASA by EMS prior to arrival   · Reversed on admission with Freddrick Swain and DDAVP  · Holding AC/AP due to hemorrhagic shock  · INR most recently 1 67  · Hgb stable

## 2023-05-05 NOTE — ASSESSMENT & PLAN NOTE
· hsTn  187 > 753 > 1150 > 1981 > 2316 > 3266  · Developed CP in the setting of hemorrhagic shock  Patient with history of CAD and previous CABG x2 in 2019  · Initially had EKG changes with ST depressions / T wave inversions and new BBB  EKG improving  · Cardiology consulted, appreciate recommendations  · 5/4 Echo: EF 60%, G1DD, LA mild dilation, AV mild to moderate regurgitaiton, Aortic root moderately dilated      Plan:  · Unable to heparinize in the setting of hemorrhagic shock  · Continue statin  · If hgb remains stable start low dose aspirin

## 2023-05-05 NOTE — ASSESSMENT & PLAN NOTE
· Hx CABG 2020  · Hold AC/AP for now  · Hold Xarelto  · Continue metoprolol   · Continue statin  · Continue labetalol as needed for hypertension

## 2023-05-05 NOTE — ASSESSMENT & PLAN NOTE
Lab Results   Component Value Date    CREATININE 2 42 (H) 05/04/2023    CREATININE 2 40 (H) 05/04/2023    CREATININE 2 28 (H) 05/03/2023       · 2/2 hemorrhagic shock, contrast exposure, left kidney capsule rupture  · Baseline creatinine 0 9, on admission 1 18  · 3-way potter placed in ER in anticipation of possible hematuria    Plan:  · IVF discontinued  · CXR overnight with mild vascular congestion likely 2/2 volume resuscitation with blood products and IVF  · Maintain 3 way potter  · Monitor UO and renal indices  · Avoid hypotension, nephrotoxins  · Given Lasix IV 20 mg x1 overnight with increase in UO, consider re-dosing

## 2023-05-05 NOTE — PHYSICAL THERAPY NOTE
Physical Therapy Treatment Note    Patient's Name: Gifty Pruett  : 1950     05/05/23 1335   PT Last Visit   PT Visit Date 23   Note Type   Note Type Treatment   Pain Assessment   Pain Assessment Tool 0-10   Pain Score No Pain   Subjective   Subjective pt supine in bed, agreeable to PT intervention   Bed Mobility   Supine to Sit 2  Maximal assistance   Additional items Assist x 1; Increased time required;LE management   Sit to Supine 2  Maximal assistance   Additional items Assist x 1; Increased time required;LE management  (trunk management, LE management)   Additional Comments (S)  while sitting EOB pt arterial line pressures increasing to 208/73, HR increasing to 120 BPM, session held given response, pt returned to supine position with PANCHO hidalgo and gisella aware, CC AP aware as well   Balance   Static Sitting Fair   Dynamic Sitting Fair -   Activity Tolerance   Activity Tolerance Patient limited by fatigue;Treatment limited secondary to medical complications (Comment)   Nurse Made Aware PANCHO hidalgo pre/post, PANCHO obando during   Assessment   Prognosis Guarded   Problem List Decreased strength;Decreased endurance   Assessment Pt with decreased activity tolerance as compared to yesterday, currently limited by medical status  Continues to require max x 1 assist for bed mobility  Did demonstrate ability to sit edge of bed for about 2 minutes, varying levels of assit from CGA to moderate for sitting balance  Continue to reccomend post acute rehabilitation upon discharge  Barriers to Discharge Inaccessible home environment   Barriers to Discharge Comments 7+7 JESICA   Goals   Patient Goals to feel better   [facility] Expiration Date 23   Short Term Goal #1 In 10 days pt will be able to: 1  Demonstrate ability to perform all aspects of bed mobility independently to improve functional safety    2  Perform functional transfers independently to facilitate safe return to previous living environment  3   Ambulate 150 ft with LRAD and supervision with stable vitals to improve safety with household distances and reduce fall risk  4  Improve LE strength grades by 1 to increase ease of functional mobility with transfers and gait  5  Pt will demonstrate improved balance by one grade in order to decrease risk of falls  6  Climb 7 steps with 1 HR with Sandeep to simulate entrance to home  PT Treatment Day 1   Plan   Treatment/Interventions Functional transfer training;LE strengthening/ROM; Therapeutic exercise; Endurance training;Bed mobility; Equipment eval/education;Patient/family training;Gait training;Spoke to nursing   PT Frequency 3-5x/wk   Recommendation   PT Discharge Recommendation Post acute rehabilitation services   Equipment Recommended [facility] Recommended Wheeled walker   AM-PAC Basic Mobility Inpatient   Turning in Flat Bed Without Bedrails 2   Lying on Back to Sitting on Edge of Flat Bed Without Bedrails 2   Moving Bed to Chair 1   Standing Up From Chair Using Arms 1   Walk in Room 1   Climb 3-5 Stairs With Railing 1   Basic Mobility Inpatient Raw Score 8   Turning Head Towards Sound 3   Follow Simple Instructions 3   Low Function Basic Mobility Raw Score  14   Low Function Basic Mobility Standardized Score  22 01   Highest Level Of Mobility   JH-HLM Goal 3: Sit at edge of bed   JH-HLM Achieved 3: Sit at edge of bed   Education   Education Provided Mobility training   Patient Demonstrates verbal understanding   End of Consult   Patient Position at End of Consult Supine;Bed/Chair alarm activated; All needs within reach     The patient's AM-PAC Basic Mobility Inpatient Short Form Raw Score is 8  A Raw score of less than or equal to 16 suggests the patient may benefit from discharge to post-acute rehabilitation services  Please also refer to the recommendation of the Physical Therapist for safe discharge planning        Dione Lee, PT

## 2023-05-05 NOTE — ASSESSMENT & PLAN NOTE
· Last seizure: July 2022 (was off medication at time of seizure due to cost)  · Currently well controlled with Vimpat  · Continue home Vimpat

## 2023-05-05 NOTE — ASSESSMENT & PLAN NOTE
"· Non-traumatic  · Patient was reportedly in his usual state of health when he awoke earlier this evening with sudden onset left abdominal / flank pain  Review of notes from [facility] -- he does have history of renal cysts/stones in the past  He is on Xarelto, and was given ASA by his wife prior to arrival to hospital   · CT ap: on my review -- left renal capsule rupture with contrast extravasation, fluid surrounding likely RP hemorrhage  Radiology read: \"Large subcapsular hematoma compressing the left kidney measuring 13 5 x 15 8 x 9 7 cm and up to 6 2 cm in thickness  Linear hyperdensities within the posterior aspect of the hematoma compatible with active extravasation  There is surrounding perinephric and retroperitoneal hemorrhage  \"  · Hgb 15 > 12 while in ER  · INR 4 3 > 1 67  · Received Kcentra and DDAVP for reversal  · Resuscitated with blood products as below  · IR consulted for embolization / bleeding control  · Lower pole initially embolized and patient redeveloped shock  Returned to IR for repeat embolization of lower pole  · Now with stable hemodynamics and h/h    Plan:   · Continue 3 way potter, will need CBI if bloody UO noted  · If re-bleeds will likely need emergent nephrectomy for bleeding control  · Serial abdominal exams  · Diet: currently on clears  If hgb remains stable, consider advancing  · Trend hgb as below  · Urology consulted, appreciate recommendations  · Monitor UO and renal indices closely    "

## 2023-05-05 NOTE — PROGRESS NOTES
"[facility]  Progress Note  Name: Emiliano Severino  MRN: [id number]  Unit/Bed#: ICU 12 I Date of Admission: 5/3/2023   Date of Service: 5/5/2023 I Hospital Day: 2    Assessment/Plan   * Kidney capsule rupture, left, initial encounter  Assessment & Plan  · Non-traumatic  · Patient was reportedly in his usual state of health when he awoke earlier this evening with sudden onset left abdominal / flank pain  Review of notes from [facility] [place] -- he does have history of renal cysts/stones in the past  He is on Xarelto, and was given ASA by his wife prior to arrival to hospital   · CT ap: on my review -- left renal capsule rupture with contrast extravasation, fluid surrounding likely RP hemorrhage  Radiology read: \"Large subcapsular hematoma compressing the left kidney measuring 13 5 x 15 8 x 9 7 cm and up to 6 2 cm in thickness  Linear hyperdensities within the posterior aspect of the hematoma compatible with active extravasation  There is surrounding perinephric and retroperitoneal hemorrhage  \"  · Hgb 15 > 12 while in ER  · INR 4 3 > 1 67  · Received Kcentra and DDAVP for reversal  · Resuscitated with blood products as below  · IR consulted for embolization / bleeding control  · Lower pole initially embolized and patient redeveloped shock  Returned to IR for repeat embolization of lower pole  · Now with stable hemodynamics and h/h    Plan:   · Continue 3 way potter, will need CBI if bloody UO noted  · If re-bleeds will likely need emergent nephrectomy for bleeding control  · Serial abdominal exams  · Diet: currently on clears  If hgb remains stable, consider advancing  · Trend hgb as below  · Urology consulted, appreciate recommendations  · Monitor UO and renal indices closely      Acute blood loss anemia  Assessment & Plan  Lab Results   Component Value Date    HGB 8 5 (L) 05/05/2023    HGB 9 6 (L) 05/04/2023    HGB 10 4 (L) 05/04/2023    HGB 10 1 (L) 05/04/2023       · 2/2 kidney capsule rupture " "in the setting of coagulopathy on home Xarelto and ASA in route to ER  · Reversed with Sirena Vaughan and DDAVP  · Total Blood products to date  · PRBCs 4 units  · FFP 4 units  · Transfuse for HGB < 7 or hemodynamic instability  · Required levophed for short period of time in IR -- weaned off prior to completion of procedure  · Maintain MAP > 65 mmHg    Hemorrhagic shock (HCC)-resolved as of 5/5/2023  Assessment & Plan  Lab Results   Component Value Date    HGB 9 6 (L) 05/04/2023    HGB 10 4 (L) 05/04/2023    HGB 10 1 (L) 05/04/2023    HGB 10 2 (L) 05/04/2023    HGB 11 0 (L) 05/03/2023    HGB 9 2 (L) 05/03/2023     Shock state resolved  · 2/2 kidney capsule rupture in the setting of coagulopathy on home Xarelto and ASA in route to ER  · Reversed with KCentra and DDAVP  · Total Blood products to date  · PRBCs 4 units  · FFP 4 units  · Transfuse for HGB < 7 or hemodynamic instability  · Required levophed for short period of time in IR -- weaned off prior to completion of procedure  · Maintain MAP > 65 mmHg    Splenic hemorrhage  Assessment & Plan  · Noted on CT as above  Per radiology: \"There is vague hypodensity within the superior spleen measuring up to 4 cm  In addition there is a 1 9 cm hypodensity in the superior spleen  There are small foci of hyperdensity within the superior aspect of the spleen  There is perisplenic hemorrhage  \"  · IR consulted as above    No evidence of flank or ABD bruising    Plan:  · Monitor ABD exams  · Transfuse as above      SHELIA (acute kidney injury) [facility])  Assessment & Plan  Lab Results   Component Value Date    CREATININE 2 42 (H) 05/04/2023    CREATININE 2 40 (H) 05/04/2023    CREATININE 2 28 (H) 05/03/2023       · 2/2 hemorrhagic shock, contrast exposure, left kidney capsule rupture  · Baseline creatinine 0 9, on admission 1 18  · 3-way potter placed in ER in anticipation of possible hematuria    Plan:  · IVF discontinued  · CXR overnight with mild vascular congestion likely 2/2 volume " resuscitation with blood products and IVF  · Maintain 3 way potter  · Monitor UO and renal indices  · Avoid hypotension, nephrotoxins  · Given Lasix IV 20 mg x1 overnight with increase in UO, consider re-dosing    Elevated troponin  Assessment & Plan  · hsTn  187 > 753 > 1150 > 1981 > 2316 > 3266  · Developed CP in the setting of hemorrhagic shock  Patient with history of CAD and previous CABG x2 in 2019  · Initially had EKG changes with ST depressions / T wave inversions and new BBB  EKG improving  · Cardiology consulted, appreciate recommendations  · 5/4 Echo: EF 60%, G1DD, LA mild dilation, AV mild to moderate regurgitaiton, Aortic root moderately dilated  Plan:  · Unable to heparinize in the setting of hemorrhagic shock  · Continue statin  · If hgb remains stable start low dose aspirin    Coagulopathy (HCC)  Assessment & Plan  · INR 4 33 on arrival -- likely falsely elevated 2/2 Xarelto  Also received ASA by EMS prior to arrival   · Reversed on admission with [facility] and DDAVP  · Holding AC/AP due to hemorrhagic shock  · INR most recently 1 67  · Hgb stable    Acute respiratory insufficiency-resolved as of 5/5/2023  Assessment & Plan  RESOLVED: patient extubated  · Intubated for 2nd IR procedure  · Patient returned to ICU intubated  · Sedation with propofol -- titrate for goal RASS 0 to -1  · Vent bundle ordered  · SAT/SBT in am  · ACVC 14/450/40/6  · Titrate FiO2 for SpO2 > 92%      CAD (coronary artery disease)  Assessment & Plan  · Hx CABG 2020  · Hold AC/AP for now  · Hold Xarelto  · Continue metoprolol   · Continue statin  · Continue labetalol as needed for hypertension    Paroxysmal atrial fibrillation (HCC)  Assessment & Plan  · Home regimen: Xarelto and metoprolol  · Holding Xarelto for now, in the setting of hemorrhagic shock/kidney capsule rupture  · Continue telemetry  · Patient is now hemodynamically stable s/p volume resuscitation with blood products and IR procedure for embolization    Was on vasopressors for short period of time during volume resuscitation  No longer on vasopressors  · Continue metoprolol  · Holding AC/AP 2/2 ABLA with shock on admission in the setting of renal capsule rupture    Type 2 diabetes mellitus [facility])  Assessment & Plan  Lab Results   Component Value Date    HGBA1C 5 9 (H) 04/20/2023       Recent Labs     05/04/23  1202 05/04/23  1735 05/04/23  1810 05/05/23  0007   POCGLU 138 146* 144* 144*       Blood Sugar Average: Last 72 hrs:  (P) 988 [id number]   · Holding home glimepiride and metformin for now  · Start accuchecks ACHS with SSI  · Goal blood glucose 140-180  · Avoid hypoglycemia  · Once able to take PO will need CHO controlled diet  · Continue clear diet  Yesterday patient was refusing to eat    Seizure disorder [facility])  Assessment & Plan  · Last seizure: July 2022 (was off medication at time of seizure due to cost)  · Currently well controlled with Vimpat  · Continue home Vimpat  Leukemia [facility])  Assessment & Plan  · S/p chemotherapy -- currently in remission for 14 years  ICU Core Measures     A: Assess, Prevent, and Manage Pain · Has pain been assessed? Yes  · Need for changes to pain regimen? No   B: Both SAT/SAT  · N/A   C: Choice of Sedation · RASS Goal: N/A patient not on sedation  · Need for changes to sedation or analgesia regimen? No   D: Delirium · CAM-ICU: Negative   E: Early Mobility  · Plan for early mobility? Yes   F: Family Engagement · Plan for family engagement today? Yes       Review of Invasive Devices: Huggins Plan: Post operative urological requirements    Lavinia Plan: Discontinue arterial line    Prophylaxis:  VTE    Stress Ulcer  covered bypantoprazole (PROTONIX) injection 40 mg [[id number]]       Subjective   HPI/24hr events:   · Patient extubated  · Hgb slowly trending down  Review of Systems   Constitutional: Negative for chills, fatigue and fever  HENT: Negative  Eyes: Negative      Respiratory: Positive for shortness of breath  Negative for cough, chest tightness and wheezing  Gastrointestinal: Negative for abdominal distention, diarrhea, nausea and vomiting  Endocrine: Negative  Genitourinary: Negative for decreased urine volume, difficulty urinating, frequency and hematuria  Musculoskeletal: Negative for arthralgias and myalgias  Skin: Negative for pallor, rash and wound  Allergic/Immunologic: Negative  Neurological: Negative  Hematological: Negative  Psychiatric/Behavioral: Negative  Objective                            Vitals I/O      Most Recent Min/Max in 24hrs   Temp 98 4 °F (36 9 °C) Temp  Min: 98 2 °F (36 8 °C)  Max: 100 5 °F (38 1 °C)   Pulse 86 Pulse  Min: 67  Max: 118   Resp (!) 30 Resp  Min: 22  Max: 35   /72 BP  Min: 121/66  Max: 157/83   O2 Sat 99 % SpO2  Min: 89 %  Max: 100 %      Intake/Output Summary (Last 24 hours) at 5/5/2023 0056  Last data filed at 5/4/2023 2301  Gross per 24 hour   Intake 1387 63 ml   Output 1065 ml   Net 322 63 ml         Diet Jer/CHO Controlled; Diabetic CL Liquids     Invasive Monitoring Physical exam   Arterial Line  Lavinia /63  Arterial Line BP  Min: 113/51  Max: 187/73   MAP 90 mmHg  Arterial Line MAP (mmHg)  Min: 71 mmHg  Max: 105 mmHg    Physical Exam  Vitals and nursing note reviewed  Constitutional:       General: He is not in acute distress  Appearance: Normal appearance  He is well-developed and well-groomed  He is obese  He is ill-appearing  He is not toxic-appearing or diaphoretic  Interventions: Nasal cannula in place  HENT:      Head: Normocephalic and atraumatic  Right Ear: External ear normal       Left Ear: External ear normal       Nose: Nose normal  No congestion or rhinorrhea  Mouth/Throat:      Pharynx: Oropharynx is clear  No oropharyngeal exudate or posterior oropharyngeal erythema  Eyes:      General: No scleral icterus  Extraocular Movements: Extraocular movements intact  Conjunctiva/sclera: Conjunctivae normal       Pupils: Pupils are equal, round, and reactive to light  Neck:      Vascular: No carotid bruit  Cardiovascular:      Rate and Rhythm: Normal rate and regular rhythm  Pulses: Normal pulses  Radial pulses are 2+ on the right side and 2+ on the left side  Popliteal pulses are 2+ on the right side and 2+ on the left side  Heart sounds: Normal heart sounds, S1 normal and S2 normal  No murmur heard  No friction rub  No gallop  Pulmonary:      Effort: Pulmonary effort is normal  Tachypnea present  No accessory muscle usage or respiratory distress  Breath sounds: Decreased breath sounds present  No wheezing, rhonchi or rales  Abdominal:      General: Abdomen is flat  Bowel sounds are normal  There is no distension  Palpations: Abdomen is soft  Tenderness: There is no abdominal tenderness  Musculoskeletal:         General: No swelling or tenderness  Normal range of motion  Cervical back: Normal range of motion and neck supple  Right lower leg: No edema  Left lower leg: No edema  Lymphadenopathy:      Cervical: No cervical adenopathy  Skin:     General: Skin is warm and dry  Capillary Refill: Capillary refill takes less than 2 seconds  Coloration: Skin is not pale  Findings: No bruising, erythema or rash  Neurological:      General: No focal deficit present  Mental Status: He is alert and oriented to person, place, and time  GCS: GCS eye subscore is 4  GCS verbal subscore is 5  GCS motor subscore is 6  Cranial Nerves: Cranial nerves 2-12 are intact  No cranial nerve deficit, dysarthria or facial asymmetry  Sensory: Sensation is intact  No sensory deficit  Psychiatric:         Attention and Perception: Attention normal          Mood and Affect: Affect normal          Speech: Speech normal          Behavior: Behavior normal  Behavior is cooperative           Thought Content: Thought content normal          Cognition and Memory: Cognition normal          Judgment: Judgment normal             Diagnostic Studies      EKG: sinus rhythm on telemetry  Imagin/4 CXR: vascular congestion   I have personally reviewed pertinent reports  and I have personally reviewed pertinent films in PACS     Medications:  Scheduled PRN   acetaminophen, 975 mg, Q8H [address]  chlorhexidine, 15 mL, Q12H JAD  docusate sodium, 100 mg, BID  ezetimibe, 10 mg, Daily With Dinner   And  pravastatin, 80 mg, Daily With Dinner  insulin lispro, 1-6 Units, Q6H JAD  lacosamide, 100 mg, Q12H JAD  lidocaine, 1 patch, Daily  methocarbamol, 500 mg, Q8H JAD  metoprolol tartrate, 25 mg, Q12H JAD  pantoprazole, 40 mg, Q12H JAD  senna, 1 tablet, HS      HYDROmorphone, 0 2 mg, Q4H PRN  labetalol, 20 mg, Q6H PRN  metoclopramide, 10 mg, Q6H PRN  ondansetron, 4 mg, Q6H PRN  oxyCODONE, 2 5 mg, Q4H PRN   Or  oxyCODONE, 5 mg, Q4H PRN       Continuous          Labs:    CBC    Recent Labs     23  0833 23  1218 23  0113 23  0435 23  1215 23  1733 23  0036   WBC 34 32*   < > 19 02* 19 26*  --   --   --    HGB 10 8*   < > 10 2* 10 1*   < > 9 6* 8 5*   HCT 32 1*   < > 28 5* 28 6*   < > 27 6* 24 4*      < > 120* 129*  --   --   --    BANDSPCT 2  --   --   --   --   --   --     < > = values in this interval not displayed  BMP    Recent Labs     23  0113 23  0435   SODIUM 137 138   K 4 6 4 5    109*   CO2 20* 19*   AGAP 9 10   BUN 31* 33*   CREATININE 2 40* 2 42*   CALCIUM 8 4 8 3*       Coags    Recent Labs     23  0310 23  0640 23  1858 23  0113   INR 4 33*   < > 1 81* 1 67*   PTT 41*  --   --   --     < > = values in this interval not displayed          Additional Electrolytes  Recent Labs     23  1904 23  0113 05/04/23  0435   MG  --  2 2 2 2   PHOS  --  3 7 3 9   CAIONIZED 1 12  --  1 07*          Blood Gas    Recent Labs 05/04/23  0114   PHART 7 433   DUH2XTJ 30 1*   PO2ART 96 4   QQG7YLY 19 7*   BEART -3 7   SOURCE Line, Arterial     Recent Labs     05/04/23  0114   SOURCE Line, Arterial    LFTs  Recent Labs     05/03/23  0833 05/03/23  1513   ALT 10 16   AST 14 31   ALKPHOS 32* 39   ALB 3 1* 3 3*   TBILI 0 74 0 84       Infectious  No recent results  Glucose  Recent Labs     05/03/23  1513 05/03/23  1858 05/04/23  0113 05/04/23  0435   GLUC 279* 200* 153* 157*               Critical Care Time Delivered: Upon my evaluation, this patient had a high probability of imminent or life-threatening deterioration due to acute blood loss anemia, which required my direct attention, intervention, and personal management  I have personally provided 35 minutes of critical care time, exclusive of procedures, teaching, family meetings, and any prior time recorded by providers other than myself       Belia Garcia

## 2023-05-05 NOTE — ASSESSMENT & PLAN NOTE
Lab Results   Component Value Date    HGBA1C 5 9 (H) 04/20/2023       Recent Labs     05/04/23  1202 05/04/23  1735 05/04/23  1810 05/05/23  0007   POCGLU 138 146* 144* 144*       Blood Sugar Average: Last 72 hrs:  (P) 185 [id number]   · Holding home glimepiride and metformin for now  · Start accuchecks ACHS with SSI  · Goal blood glucose 140-180  · Avoid hypoglycemia  · Once able to take PO will need CHO controlled diet  · Continue clear diet    Yesterday patient was refusing to eat

## 2023-05-06 LAB
ABO GROUP BLD BPU: NORMAL
ANION GAP SERPL CALCULATED.3IONS-SCNC: 8 MMOL/L (ref 4–13)
BPU ID: NORMAL
BUN SERPL-MCNC: 44 MG/DL (ref 5–25)
CA-I BLD-SCNC: 1 MMOL/L (ref 1.12–1.32)
CALCIUM SERPL-MCNC: 8 MG/DL (ref 8.4–10.2)
CHLORIDE SERPL-SCNC: 104 MMOL/L (ref 96–108)
CO2 SERPL-SCNC: 25 MMOL/L (ref 21–32)
CREAT SERPL-MCNC: 2.29 MG/DL (ref 0.6–1.3)
CROSSMATCH: NORMAL
ERYTHROCYTE [DISTWIDTH] IN BLOOD BY AUTOMATED COUNT: 15.4 % (ref 11.6–15.1)
GFR SERPL CREATININE-BSD FRML MDRD: 27 ML/MIN/1.73SQ M
GLUCOSE SERPL-MCNC: 109 MG/DL (ref 65–140)
GLUCOSE SERPL-MCNC: 119 MG/DL (ref 65–140)
GLUCOSE SERPL-MCNC: 119 MG/DL (ref 65–140)
GLUCOSE SERPL-MCNC: 128 MG/DL (ref 65–140)
GLUCOSE SERPL-MCNC: 138 MG/DL (ref 65–140)
GLUCOSE SERPL-MCNC: 138 MG/DL (ref 65–140)
HCT VFR BLD AUTO: 22.6 % (ref 36.5–49.3)
HCT VFR BLD AUTO: 23.6 % (ref 36.5–49.3)
HGB BLD-MCNC: 7.6 G/DL (ref 12–17)
HGB BLD-MCNC: 8 G/DL (ref 12–17)
MAGNESIUM SERPL-MCNC: 2.4 MG/DL (ref 1.9–2.7)
MCH RBC QN AUTO: 31.8 PG (ref 26.8–34.3)
MCHC RBC AUTO-ENTMCNC: 33.6 G/DL (ref 31.4–37.4)
MCV RBC AUTO: 95 FL (ref 82–98)
PHOSPHATE SERPL-MCNC: 3.9 MG/DL (ref 2.3–4.1)
PLATELET # BLD AUTO: 97 THOUSANDS/UL (ref 149–390)
PMV BLD AUTO: 10.6 FL (ref 8.9–12.7)
POTASSIUM SERPL-SCNC: 4.3 MMOL/L (ref 3.5–5.3)
RBC # BLD AUTO: 2.39 MILLION/UL (ref 3.88–5.62)
SODIUM SERPL-SCNC: 137 MMOL/L (ref 135–147)
UNIT DISPENSE STATUS: NORMAL
UNIT PRODUCT CODE: NORMAL
UNIT PRODUCT VOLUME: 250 ML
UNIT PRODUCT VOLUME: 250 ML
UNIT PRODUCT VOLUME: 280 ML
UNIT PRODUCT VOLUME: 280 ML
UNIT PRODUCT VOLUME: 350 ML
UNIT RH: NORMAL
WBC # BLD AUTO: 19.34 THOUSAND/UL (ref 4.31–10.16)

## 2023-05-06 RX ORDER — FUROSEMIDE 10 MG/ML
20 INJECTION INTRAMUSCULAR; INTRAVENOUS
Status: COMPLETED | OUTPATIENT
Start: 2023-05-06 | End: 2023-05-06

## 2023-05-06 RX ORDER — LEVALBUTEROL INHALATION SOLUTION 1.25 MG/3ML
1.25 SOLUTION RESPIRATORY (INHALATION)
Status: DISCONTINUED | OUTPATIENT
Start: 2023-05-06 | End: 2023-05-06

## 2023-05-06 RX ORDER — HEPARIN SODIUM 5000 [USP'U]/ML
5000 INJECTION, SOLUTION INTRAVENOUS; SUBCUTANEOUS EVERY 8 HOURS SCHEDULED
Status: DISCONTINUED | OUTPATIENT
Start: 2023-05-06 | End: 2023-05-15 | Stop reason: HOSPADM

## 2023-05-06 RX ADMIN — METHOCARBAMOL TABLETS 500 MG: 500 TABLET, COATED ORAL at 05:02

## 2023-05-06 RX ADMIN — DOCUSATE SODIUM 100 MG: 100 CAPSULE, LIQUID FILLED ORAL at 09:16

## 2023-05-06 RX ADMIN — DOCUSATE SODIUM 100 MG: 100 CAPSULE, LIQUID FILLED ORAL at 17:09

## 2023-05-06 RX ADMIN — ACETAMINOPHEN 975 MG: 325 TABLET ORAL at 05:02

## 2023-05-06 RX ADMIN — IPRATROPIUM BROMIDE 0.5 MG: 0.5 SOLUTION RESPIRATORY (INHALATION) at 08:30

## 2023-05-06 RX ADMIN — LEVALBUTEROL HYDROCHLORIDE 1.25 MG: 1.25 SOLUTION RESPIRATORY (INHALATION) at 08:30

## 2023-05-06 RX ADMIN — HEPARIN SODIUM 5000 UNITS: 5000 INJECTION INTRAVENOUS; SUBCUTANEOUS at 21:17

## 2023-05-06 RX ADMIN — METHOCARBAMOL TABLETS 500 MG: 500 TABLET, COATED ORAL at 21:18

## 2023-05-06 RX ADMIN — CHLORHEXIDINE GLUCONATE 0.12% ORAL RINSE 15 ML: 1.2 LIQUID ORAL at 20:36

## 2023-05-06 RX ADMIN — FUROSEMIDE 20 MG: 10 INJECTION, SOLUTION INTRAMUSCULAR; INTRAVENOUS at 09:15

## 2023-05-06 RX ADMIN — PANTOPRAZOLE SODIUM 40 MG: 40 INJECTION, POWDER, FOR SOLUTION INTRAVENOUS at 09:16

## 2023-05-06 RX ADMIN — CHLORHEXIDINE GLUCONATE 0.12% ORAL RINSE 15 ML: 1.2 LIQUID ORAL at 09:16

## 2023-05-06 RX ADMIN — PANTOPRAZOLE SODIUM 40 MG: 40 INJECTION, POWDER, FOR SOLUTION INTRAVENOUS at 20:36

## 2023-05-06 RX ADMIN — METOPROLOL TARTRATE 25 MG: 25 TABLET, FILM COATED ORAL at 09:16

## 2023-05-06 RX ADMIN — LACOSAMIDE 100 MG: 100 TABLET, FILM COATED ORAL at 09:16

## 2023-05-06 RX ADMIN — LACOSAMIDE 100 MG: 100 TABLET, FILM COATED ORAL at 20:35

## 2023-05-06 RX ADMIN — METHOCARBAMOL TABLETS 500 MG: 500 TABLET, COATED ORAL at 14:16

## 2023-05-06 RX ADMIN — METOPROLOL TARTRATE 25 MG: 25 TABLET, FILM COATED ORAL at 20:34

## 2023-05-06 RX ADMIN — PRAVASTATIN SODIUM 80 MG: 80 TABLET ORAL at 16:52

## 2023-05-06 RX ADMIN — ACETAMINOPHEN 975 MG: 325 TABLET ORAL at 14:16

## 2023-05-06 RX ADMIN — FUROSEMIDE 20 MG: 10 INJECTION, SOLUTION INTRAMUSCULAR; INTRAVENOUS at 16:52

## 2023-05-06 RX ADMIN — SENNOSIDES 8.6 MG: 8.6 TABLET, FILM COATED ORAL at 21:17

## 2023-05-06 RX ADMIN — HEPARIN SODIUM 5000 UNITS: 5000 INJECTION INTRAVENOUS; SUBCUTANEOUS at 14:16

## 2023-05-06 RX ADMIN — LIDOCAINE 5% 1 PATCH: 700 PATCH TOPICAL at 09:16

## 2023-05-06 NOTE — ASSESSMENT & PLAN NOTE
Lab Results   Component Value Date    CREATININE 2 39 (H) 05/05/2023    CREATININE 2 42 (H) 05/04/2023    CREATININE 2 40 (H) 05/04/2023       · 2/2 hemorrhagic shock, contrast exposure, left kidney capsule rupture  · Baseline creatinine 0 9, on admission 1 18 - most recently 2 39 - awaiting labs this AM  · 3-way potter placed in ER in anticipation of possible hematuria    Plan:  · IVF discontinued  · CXR overnight with mild vascular congestion likely 2/2 volume resuscitation with blood products and IVF  · Maintain 3 way potter  · Monitor UO and renal indices  · Avoid hypotension, nephrotoxins  · Repeated Lasix IV 20 mg x1 overnight with increase in UO, consider re-dosing at 40mg

## 2023-05-06 NOTE — PROGRESS NOTES
[facility]  Progress Note  Name: King Rgoers  MRN: [id number]  Unit/Bed#: ICU 12 I Date of Admission: 5/3/2023   Date of Service: 5/6/2023 I Hospital Day: 3    Assessment/Plan   * Kidney capsule rupture, left, initial encounter  Assessment & Plan  · Non-traumatic  · Patient was reportedly in his usual state of health when he awoke with sudden onset left abdominal/flank pain  Review of notes from [facility] -- he does have history of renal cysts/stones in the past  He is on Xarelto, and was given ASA by his wife prior to arrival to hospital   · CT A/P:  Large subcapsular hematoma compressing the left kidney measuring 13 5 x 15 8 x 9 7 cm and up to 6 2 cm in thickness  Linear hyperdensities within the posterior aspect of the hematoma compatible with active extravasation  There is surrounding perinephric and retroperitoneal hemorrhage  · Received Kcentra and DDAVP for reversal  · Resuscitated with blood products as below  · IR consulted for embolization / bleeding control  · Lower pole initially embolized and patient redeveloped shock  Returned to IR for repeat embolization of lower pole  · Now remains with stable hemodynamics and h/h    Plan:   · Continue 3 way potter, will need CBI if bloody UO noted  · If re-bleeds will likely need emergent nephrectomy for bleeding control  · Serial abdominal exams  · Trend hgb as below  · Urology consulted - input appreciated   · Monitor UO and renal indices closely      Acute blood loss anemia  Assessment & Plan  Lab Results   Component Value Date    HGB 8 6 (L) 05/05/2023    HGB 8 4 (L) 05/05/2023    HGB 8 5 (L) 05/05/2023    HGB 9 6 (L) 05/04/2023       · 2/2 kidney capsule rupture in the setting of coagulopathy on home Xarelto and ASA in route to ER  · Reversed with KCentra and DDAVP  · Total Blood products to date  · PRBCs 4 units  · FFP 4 units  · Transfuse for HGB < 7 or hemodynamic instability  · Required levophed for short period of time in IR "-- weaned off prior to completion of procedure  · Maintain MAP > 65 mmHg  · Follow H&H    Splenic hemorrhage  Assessment & Plan  · Noted on CT as above  Per radiology: \"There is vague hypodensity within the superior spleen measuring up to 4 cm  In addition there is a 1 9 cm hypodensity in the superior spleen  There are small foci of hyperdensity within the superior aspect of the spleen  There is perisplenic hemorrhage  \"  · IR consulted as above  No evidence of flank or ABD bruising    Plan:  · Monitor ABD exams  · Transfuse as above      SHELIA (acute kidney injury) [facility])  Assessment & Plan  Lab Results   Component Value Date    CREATININE 2 39 (H) 05/05/2023    CREATININE 2 42 (H) 05/04/2023    CREATININE 2 40 (H) 05/04/2023       · 2/2 hemorrhagic shock, contrast exposure, left kidney capsule rupture  · Baseline creatinine 0 9, on admission 1 18 - most recently 2 39 - awaiting labs this AM  · 3-way potter placed in ER in anticipation of possible hematuria    Plan:  · IVF discontinued  · CXR overnight with mild vascular congestion likely 2/2 volume resuscitation with blood products and IVF  · Maintain 3 way potter  · Monitor UO and renal indices  · Avoid hypotension, nephrotoxins  · Repeated Lasix IV 20 mg x1 overnight with increase in UO, consider re-dosing at 40mg    Coagulopathy (HCC)  Assessment & Plan  · INR 4 33 on arrival -- likely falsely elevated 2/2 Xarelto    Also received ASA by EMS prior to arrival   · Reversed on admission with Tomy Fallow and DDAVP  · Holding AC/AP due to hemorrhagic shock  · INR most recently 1 67  · Hgb stable    CAD (coronary artery disease)  Assessment & Plan  · Hx CABG 2020  · Hold AC/AP for now  · Hold Xarelto  · Continue metoprolol   · Continue statin  · Continue labetalol as needed for hypertension    Paroxysmal atrial fibrillation (HCC)  Assessment & Plan  · Home regimen: Xarelto and metoprolol  · Holding Xarelto for now, in the setting of hemorrhagic shock/kidney capsule " rupture  · Continue telemetry  · Patient is now hemodynamically stable s/p volume resuscitation with blood products and IR procedure for embolization  Was on vasopressors for short period of time during volume resuscitation  No longer on vasopressors  · Continue metoprolol  · Holding AC/AP 2/2 ABLA with shock on admission in the setting of renal capsule rupture    Type 2 diabetes mellitus [facility])  Assessment & Plan  Lab Results   Component Value Date    HGBA1C 5 9 (H) 04/20/2023       Recent Labs     05/05/23  0554 05/05/23  1115 05/05/23  1822 05/06/23  0009   POCGLU 156* 144* 159* 138       Blood Sugar Average: Last 72 hrs:  (P) 693 [id number]   · Holding home glimepiride and metformin for now  · Start accuchecks ACHS with SSI  · Goal blood glucose 140-180  · Avoid hypoglycemia    Seizure disorder [facility])  Assessment & Plan  · Last seizure: July 2022 (was off medication at time of seizure due to cost)  · Currently well controlled with Vimpat  · Continue home Vimpat  Leukemia [facility])  Assessment & Plan  · S/p chemotherapy -- currently in remission for 14 years  ICU Core Measures     A: Assess, Prevent, and Manage Pain · Has pain been assessed? Yes  · Need for changes to pain regimen? No   B: Both SAT/SAT  · N/A   C: Choice of Sedation · RASS Goal: 0 Alert and Calm  · Need for changes to sedation or analgesia regimen? No   D: Delirium · CAM-ICU: Negative   E: Early Mobility  · Plan for early mobility? Yes   F: Family Engagement · Plan for family engagement today? Yes       Prophylaxis:  VTE    Stress Ulcer  covered bypantoprazole (PROTONIX) injection 40 mg [[id number]]     Subjective   HPI/24hr events: No acute events overnight  Stable to downgrade to MS    Review of Systems   Constitutional: Negative  HENT: Negative  Respiratory: Negative  Cardiovascular: Negative  Gastrointestinal: Negative  Genitourinary: Negative  Musculoskeletal: Negative  Skin: Negative      Neurological: Negative  Objective                            Vitals I/O      Most Recent Min/Max in 24hrs   Temp 98 7 °F (37 1 °C) Temp  Min: 98 2 °F (36 8 °C)  Max: 100 7 °F (38 2 °C)   Pulse 104 Pulse  Min: 73  Max: 130   Resp (!) 34 Resp  Min: 27  Max: 50   /74 BP  Min: 126/68  Max: 152/77   O2 Sat 100 % SpO2  Min: 97 %  Max: 100 %      Intake/Output Summary (Last 24 hours) at 5/6/2023 0523  Last data filed at 5/6/2023 0200  Gross per 24 hour   Intake 1060 ml   Output 2965 ml   Net -1905 ml         Diet Jer/CHO Controlled; Consistent Carbohydrate Diet Level 3 (6 carb servings/90 grams CHO/meal)     Invasive Monitoring Physical exam    Physical Exam  Vitals and nursing note reviewed  Constitutional:       General: He is sleeping  He is not in acute distress  Appearance: He is not ill-appearing  HENT:      Head: Normocephalic and atraumatic  Mouth/Throat:      Mouth: Mucous membranes are moist       Pharynx: Oropharynx is clear  Eyes:      Conjunctiva/sclera: Conjunctivae normal       Pupils: Pupils are equal, round, and reactive to light  Cardiovascular:      Rate and Rhythm: Normal rate and regular rhythm  Pulmonary:      Effort: Pulmonary effort is normal       Breath sounds: Decreased breath sounds and wheezing present  Abdominal:      General: There is no distension  Palpations: Abdomen is soft  Tenderness: There is no abdominal tenderness  Musculoskeletal:      Cervical back: Neck supple  Right lower leg: No edema  Left lower leg: No edema  Skin:     General: Skin is warm and dry  Neurological:      General: No focal deficit present  Mental Status: He is easily aroused  GCS: GCS eye subscore is 4  GCS verbal subscore is 5  GCS motor subscore is 6  Diagnostic Studies    EKG: Telemetry reviewed  Imaging:  I have personally reviewed pertinent reports     and I have personally reviewed pertinent films in PACS     Medications:  Scheduled PRN acetaminophen, 975 mg, Q8H [facility]  chlorhexidine, 15 mL, Q12H JAD  docusate sodium, 100 mg, BID  ezetimibe, 10 mg, Daily With Dinner   And  pravastatin, 80 mg, Daily With Dinner  insulin lispro, 1-6 Units, Q6H JAD  lacosamide, 100 mg, Q12H JAD  lidocaine, 1 patch, Daily  methocarbamol, 500 mg, Q8H JAD  metoprolol tartrate, 25 mg, Q12H JAD  pantoprazole, 40 mg, Q12H JAD  senna, 1 tablet, HS      HYDROmorphone, 0 2 mg, Q4H PRN  labetalol, 20 mg, Q6H PRN  levalbuterol, 0 63 mg, Q8H PRN  metoclopramide, 10 mg, Q6H PRN  ondansetron, 4 mg, Q6H PRN  oxyCODONE, 2 5 mg, Q4H PRN   Or  oxyCODONE, 5 mg, Q4H PRN       Continuous          Labs:    CBC    Recent Labs     05/05/23  0704 05/05/23  1225   WBC 21 05*  --    HGB 8 4* 8 6*   HCT 24 1* 24 8*   *  --      BMP    Recent Labs     05/05/23  0704   SODIUM 137   K 4 4      CO2 23   AGAP 7   BUN 41*   CREATININE 2 39*   CALCIUM 8 0*       Coags    No recent results     Additional Electrolytes  Recent Labs     05/05/23  0704   MG 2 3   PHOS 4 6*   CAIONIZED 1 06*          Blood Gas    No recent results  No recent results LFTs  No recent results    Infectious  No recent results  Glucose  Recent Labs     05/05/23  0704   GLUC 159*          Belia Levy PA-C

## 2023-05-06 NOTE — ASSESSMENT & PLAN NOTE
· Non-traumatic  · Patient was reportedly in his usual state of health when he awoke with sudden onset left abdominal/flank pain  Review of notes from [facility] -- he does have history of renal cysts/stones in the past  He is on Xarelto, and was given ASA by his wife prior to arrival to hospital   · CT A/P:  Large subcapsular hematoma compressing the left kidney measuring 13 5 x 15 8 x 9 7 cm and up to 6 2 cm in thickness  Linear hyperdensities within the posterior aspect of the hematoma compatible with active extravasation  There is surrounding perinephric and retroperitoneal hemorrhage  · Received Kcentra and DDAVP for reversal  · Resuscitated with blood products as below  · IR consulted for embolization / bleeding control  · Lower pole initially embolized and patient redeveloped shock  Returned to IR for repeat embolization of lower pole  · Now remains with stable hemodynamics and h/h    Plan:   · Continue 3 way potter, will need CBI if bloody UO noted  · If re-bleeds will likely need emergent nephrectomy for bleeding control  · Serial abdominal exams  · Trend hgb as below  · Urology consulted - input appreciated   · Monitor UO and renal indices closely

## 2023-05-06 NOTE — PROGRESS NOTES
"Progress Note - Trauma ICU Transfer   and [facility]   Gwendolyn Way II 72 y o  male [id number]   Unit/Bed#: ICU 12 Encounter: [id number]     Assessment & Plan   Summary of Diagnosed Injuries:   L renal capsule rupture    PLAN:  Daily Hb checks  Resumed DVT ppx today  Can d/c potter if no further concerns for hematuria and cleared from urology standpoint  VTE Prophylaxis:Heparin     Disposition: Med-Surg    Code status:  Level 1 - Full Code    Consultants: INPATIENT CONSULT TO IR  IP CONSULT TO CASE MANAGEMENT  IP CONSULT TO CARDIOLOGY  INPATIENT CONSULT TO IR  IP CONSULT TO UROLOGY     Subjective   Mechanism of Injury:Other: Spontaneous Renal Hemorrhage  HPI/Last 24 hour events: \"Jonathan Ordaz II is a 72 y o  male with PMH Seizure disorder, DM type 2, CAD, leukemia in remission, who presents with left sided abdominal pain  He reports the pain started suddenly this evening around 11:30 and was severe at onset  He felt associated malaise, nausea, and generally unwell  He denies recent traumatic injury, falls, vigorous activity, illness  \"    24h: Hemoglobin stable, tolerating advanced diet  Stable for downgrade to MST  Reason for ICU admission: Monitored for persistent bleeding in setting of hemorrhagic shock after bleeding  Summary of ICU clinical course: Pt monitored post embolization  Hemoglobin stable after total 4u FFP, 4u PRBCs  Recent or scheduled procedures: No planned procedures  IR embo completed on 5/3  Outstanding/pending diagnostics: n/a       Objective   Vitals:   Temp:  [98 2 °F (36 8 °C)-98 7 °F (37 1 °C)] 98 5 °F (36 9 °C)  HR:  [] 87  Resp:  [29-50] 30  BP: (126-147)/(68-83) 147/71    I/O       05/04 0701  05/05 0700 05/05 0701  05/06 0700 05/06 0701  05/07 0700    P  O   1060 180    I V  (mL/kg) 1177 6 (9 5)      Blood       IV Piggyback 210      Total Intake(mL/kg) 1387 6 (11 2) 1060 (8 5) 180 (1 4)    Urine (mL/kg/hr) 1435 (0 5) 3550 (1 2) 275 (0 3)    Blood       Total " Output 0278 0725 275    Net -47 4 -2490 -95                  Physical Exam:   GENERAL APPEARANCE: Patient in no acute distress  HEENT: NCAT; PERRL, EOMs intact; Mucous membranes moist  CV: Regular rate and rhythm; no murmur/gallops/rubs appreciated  CHEST / LUNGS: Clear to auscultation; no wheezes/rales/rhonci  ABD: NABS; soft; non-distended; minimally tender to palpation  EXT: +2 pulses bilaterally upper & lower extremities; no edema  NEURO: GCS 15; no focal neurologic deficits; neurovascularly intact  SKIN: Warm, dry and well perfused; no rash; no jaundice  Invasive Devices     Peripheral Intravenous Line  Duration           Peripheral IV 05/03/23 Left Antecubital 3 days    Peripheral IV 05/03/23 Right Antecubital 2 days          Drain  Duration           Urethral Catheter Non-latex 18 Fr  3 days               Rationale for remaining devices: Can d/c potter when urine cleared  1  Before the illness or injury that brought you to the Emergency, did you need someone to help you on a regular basis? 0=No   2  Since the illness or injury that brought you to the Emergency, have you needed more help than usual to take care of yourself? 1=Yes   3  Have you been hospitalized for one or more nights during the past 6 months (excluding a stay in the Emergency Department)? 0=No   4  In general, do you see well? 0=Yes   5  In general, do you have serious problems with your memory? 0=No   6  Do you take more than three different medications everyday?  1=Yes   TOTAL   2     Did you order a geriatric consult if the score was 2 or greater?: yes       Lab Results:   BMP/CMP:   Lab Results   Component Value Date    SODIUM 137 05/06/2023    K 4 3 05/06/2023     05/06/2023    CO2 25 05/06/2023    BUN 44 (H) 05/06/2023    CREATININE 2 29 (H) 05/06/2023    CALCIUM 8 0 (L) 05/06/2023    EGFR 27 05/06/2023    and CBC:   Lab Results   Component Value Date    WBC 19 34 (H) 05/06/2023    HGB 7 6 (L) 05/06/2023    HCT 22 6 (L) 05/06/2023    MCV 95 05/06/2023    PLT 97 (L) 05/06/2023    MCH 31 8 05/06/2023    MCHC 33 6 05/06/2023    RDW 15 4 (H) 05/06/2023    MPV 10 6 05/06/2023       Imaging Results: I have personally reviewed pertinent reports  Chest Xray(s): N/A   FAST exam(s): N/A   CT Scan(s): positive for acute findings: Large Perinephric hematoma, splenic hematoma   Additional Xray(s): N/A     Other Studies: n/a    Code Status: Level 1 - Full Code       Patient seen and evaluated by Critical Care today and deemed to be appropriate for transfer to [facility]  Spoke to [employer] from [employer] regarding transfer  Critical Care can be contacted via [employer] with any questions or concerns

## 2023-05-06 NOTE — ASSESSMENT & PLAN NOTE
· INR 4 33 on arrival -- likely falsely elevated 2/2 Xarelto    Also received ASA by EMS prior to arrival   · Reversed on admission with [facility] and DDAVP  · Holding AC/AP due to hemorrhagic shock  · INR most recently 1 67  · Hgb stable

## 2023-05-06 NOTE — ASSESSMENT & PLAN NOTE
Lab Results   Component Value Date    HGB 8 6 (L) 05/05/2023    HGB 8 4 (L) 05/05/2023    HGB 8 5 (L) 05/05/2023    HGB 9 6 (L) 05/04/2023       · 2/2 kidney capsule rupture in the setting of coagulopathy on home Xarelto and ASA in route to ER  · Reversed with KCentra and DDAVP  · Total Blood products to date  · PRBCs 4 units  · FFP 4 units  · Transfuse for HGB < 7 or hemodynamic instability  · Required levophed for short period of time in IR -- weaned off prior to completion of procedure  · Maintain MAP > 65 mmHg  · Follow H&H

## 2023-05-06 NOTE — ASSESSMENT & PLAN NOTE
Lab Results   Component Value Date    HGBA1C 5 9 (H) 04/20/2023       Recent Labs     05/05/23  0554 05/05/23  1115 05/05/23  1822 05/06/23  0009   POCGLU 156* 144* 159* 138       Blood Sugar Average: Last 72 hrs:  (P) 246 [id number]   · Holding home glimepiride and metformin for now  · Start accuchecks ACHS with SSI  · Goal blood glucose 140-180  · Avoid hypoglycemia

## 2023-05-07 ENCOUNTER — APPOINTMENT (INPATIENT)
Dept: CT IMAGING | Facility: HOSPITAL | Age: 73
End: 2023-05-07

## 2023-05-07 ENCOUNTER — APPOINTMENT (INPATIENT)
Dept: RADIOLOGY | Facility: HOSPITAL | Age: 73
End: 2023-05-07

## 2023-05-07 LAB
ANION GAP SERPL CALCULATED.3IONS-SCNC: 7 MMOL/L (ref 4–13)
BNP SERPL-MCNC: 165 PG/ML (ref 0–100)
BUN SERPL-MCNC: 42 MG/DL (ref 5–25)
CA-I BLD-SCNC: 1.08 MMOL/L (ref 1.12–1.32)
CALCIUM SERPL-MCNC: 8.1 MG/DL (ref 8.4–10.2)
CARDIAC TROPONIN I PNL SERPL HS: 621 NG/L (ref 8–18)
CHLORIDE SERPL-SCNC: 104 MMOL/L (ref 96–108)
CO2 SERPL-SCNC: 27 MMOL/L (ref 21–32)
CREAT SERPL-MCNC: 1.76 MG/DL (ref 0.6–1.3)
ERYTHROCYTE [DISTWIDTH] IN BLOOD BY AUTOMATED COUNT: 15.1 % (ref 11.6–15.1)
GFR SERPL CREATININE-BSD FRML MDRD: 38 ML/MIN/1.73SQ M
GLUCOSE SERPL-MCNC: 104 MG/DL (ref 65–140)
GLUCOSE SERPL-MCNC: 109 MG/DL (ref 65–140)
GLUCOSE SERPL-MCNC: 113 MG/DL (ref 65–140)
GLUCOSE SERPL-MCNC: 130 MG/DL (ref 65–140)
GLUCOSE SERPL-MCNC: 136 MG/DL (ref 65–140)
HCT VFR BLD AUTO: 24.4 % (ref 36.5–49.3)
HGB BLD-MCNC: 7.9 G/DL (ref 12–17)
MAGNESIUM SERPL-MCNC: 2.4 MG/DL (ref 1.9–2.7)
MCH RBC QN AUTO: 31.6 PG (ref 26.8–34.3)
MCHC RBC AUTO-ENTMCNC: 32.4 G/DL (ref 31.4–37.4)
MCV RBC AUTO: 98 FL (ref 82–98)
PHOSPHATE SERPL-MCNC: 3.4 MG/DL (ref 2.3–4.1)
PLATELET # BLD AUTO: 115 THOUSANDS/UL (ref 149–390)
PMV BLD AUTO: 10.8 FL (ref 8.9–12.7)
POTASSIUM SERPL-SCNC: 4 MMOL/L (ref 3.5–5.3)
RBC # BLD AUTO: 2.5 MILLION/UL (ref 3.88–5.62)
SODIUM SERPL-SCNC: 138 MMOL/L (ref 135–147)
WBC # BLD AUTO: 16.16 THOUSAND/UL (ref 4.31–10.16)

## 2023-05-07 RX ORDER — SODIUM CHLORIDE FOR INHALATION 0.9 %
3 VIAL, NEBULIZER (ML) INHALATION ONCE
Status: COMPLETED | OUTPATIENT
Start: 2023-05-07 | End: 2023-05-07

## 2023-05-07 RX ORDER — SODIUM CHLORIDE, SODIUM GLUCONATE, SODIUM ACETATE, POTASSIUM CHLORIDE, MAGNESIUM CHLORIDE, SODIUM PHOSPHATE, DIBASIC, AND POTASSIUM PHOSPHATE .53; .5; .37; .037; .03; .012; .00082 G/100ML; G/100ML; G/100ML; G/100ML; G/100ML; G/100ML; G/100ML
500 INJECTION, SOLUTION INTRAVENOUS ONCE
Status: COMPLETED | OUTPATIENT
Start: 2023-05-07 | End: 2023-05-08

## 2023-05-07 RX ADMIN — METHOCARBAMOL TABLETS 500 MG: 500 TABLET, COATED ORAL at 23:14

## 2023-05-07 RX ADMIN — HEPARIN SODIUM 5000 UNITS: 5000 INJECTION INTRAVENOUS; SUBCUTANEOUS at 23:15

## 2023-05-07 RX ADMIN — RACEPINEPHRINE HYDROCHLORIDE 0.5 ML: 11.25 SOLUTION RESPIRATORY (INHALATION) at 15:53

## 2023-05-07 RX ADMIN — HEPARIN SODIUM 5000 UNITS: 5000 INJECTION INTRAVENOUS; SUBCUTANEOUS at 05:06

## 2023-05-07 RX ADMIN — SENNOSIDES 8.6 MG: 8.6 TABLET, FILM COATED ORAL at 23:14

## 2023-05-07 RX ADMIN — LACOSAMIDE 100 MG: 100 TABLET, FILM COATED ORAL at 09:59

## 2023-05-07 RX ADMIN — PRAVASTATIN SODIUM 80 MG: 80 TABLET ORAL at 18:50

## 2023-05-07 RX ADMIN — LEVALBUTEROL HYDROCHLORIDE 0.63 MG: 0.63 SOLUTION RESPIRATORY (INHALATION) at 23:35

## 2023-05-07 RX ADMIN — METOPROLOL TARTRATE 25 MG: 25 TABLET, FILM COATED ORAL at 09:59

## 2023-05-07 RX ADMIN — EZETIMIBE 10 MG: 10 TABLET ORAL at 18:50

## 2023-05-07 RX ADMIN — PANTOPRAZOLE SODIUM 40 MG: 40 INJECTION, POWDER, FOR SOLUTION INTRAVENOUS at 23:15

## 2023-05-07 RX ADMIN — METOPROLOL TARTRATE 25 MG: 25 TABLET, FILM COATED ORAL at 23:15

## 2023-05-07 RX ADMIN — ACETAMINOPHEN 975 MG: 325 TABLET ORAL at 23:14

## 2023-05-07 RX ADMIN — ISODIUM CHLORIDE 3 ML: 0.03 SOLUTION RESPIRATORY (INHALATION) at 15:54

## 2023-05-07 RX ADMIN — SODIUM CHLORIDE, SODIUM GLUCONATE, SODIUM ACETATE, POTASSIUM CHLORIDE, MAGNESIUM CHLORIDE, SODIUM PHOSPHATE, DIBASIC, AND POTASSIUM PHOSPHATE 500 ML: .53; .5; .37; .037; .03; .012; .00082 INJECTION, SOLUTION INTRAVENOUS at 10:10

## 2023-05-07 RX ADMIN — DOCUSATE SODIUM 100 MG: 100 CAPSULE, LIQUID FILLED ORAL at 09:59

## 2023-05-07 RX ADMIN — CHLORHEXIDINE GLUCONATE 0.12% ORAL RINSE 15 ML: 1.2 LIQUID ORAL at 09:59

## 2023-05-07 RX ADMIN — HEPARIN SODIUM 5000 UNITS: 5000 INJECTION INTRAVENOUS; SUBCUTANEOUS at 18:50

## 2023-05-07 RX ADMIN — METHOCARBAMOL TABLETS 500 MG: 500 TABLET, COATED ORAL at 05:06

## 2023-05-07 RX ADMIN — CHLORHEXIDINE GLUCONATE 0.12% ORAL RINSE 15 ML: 1.2 LIQUID ORAL at 23:15

## 2023-05-07 RX ADMIN — LACOSAMIDE 100 MG: 100 TABLET, FILM COATED ORAL at 23:14

## 2023-05-07 RX ADMIN — LIDOCAINE 5% 1 PATCH: 700 PATCH TOPICAL at 09:59

## 2023-05-07 RX ADMIN — PANTOPRAZOLE SODIUM 40 MG: 40 INJECTION, POWDER, FOR SOLUTION INTRAVENOUS at 10:35

## 2023-05-07 RX ADMIN — LEVALBUTEROL HYDROCHLORIDE 0.63 MG: 0.63 SOLUTION RESPIRATORY (INHALATION) at 11:32

## 2023-05-07 NOTE — ASSESSMENT & PLAN NOTE
Lab Results   Component Value Date    HGBA1C 5 9 (H) 04/20/2023       Recent Labs     05/06/23 2023 05/07/23  0014 05/07/23  0628 05/07/23  1147   POCGLU 109 104 136 109       Blood Sugar Average: Last 72 hrs:  (P) 134- Hold home medications   - Start sliding scale insulin q6h with q6h glucose checks

## 2023-05-07 NOTE — ASSESSMENT & PLAN NOTE
· Hx CABG 2020  · Hold AC/AP for now  · Hold Xarelto  · Continue metoprolol   · Continue statin  Continue labetalol as needed for hypertension

## 2023-05-07 NOTE — ASSESSMENT & PLAN NOTE
· INR 4 33 on arrival -- likely falsely elevated 2/2 Xarelto    Also received ASA by EMS prior to arrival   · Reversed on admission with [facility] and DDAVP  · Holding AC/AP due to hemorrhagic shock  · INR most recently 1 67  · Hgb stable    Lab Results   Component Value Date    INR 1 67 (H) 05/04/2023    HGB 7 9 (L) 05/07/2023    HGB 8 0 (L) 05/06/2023    HGB 7 6 (L) 05/06/2023    HGB 8 6 (L) 05/05/2023

## 2023-05-07 NOTE — ASSESSMENT & PLAN NOTE
Lab Results   Component Value Date    CREATININE 1 76 (H) 05/07/2023    CREATININE 2 29 (H) 05/06/2023    CREATININE 2 39 (H) 05/05/2023     · 2/2 hemorrhagic shock, contrast exposure, left kidney capsule rupture  · Baseline creatinine 0 9, on admission 1 18 - improving  · 3-way potter placed in ER in anticipation of possible hematuria - none noted today 5/7/23    Plan:  · IVF discontinued  · CXR overnight with mild vascular congestion likely 2/2 volume resuscitation with blood products and IVF  · Maintain 3 way potter  · Monitor UO and renal indices  · Avoid hypotension, nephrotoxins  · Monitor fluid status

## 2023-05-07 NOTE — PROGRESS NOTES
[facility]  Progress Note  Name: Nilesh Renner  MRN: [id number]  Unit/Bed#: W -01 I Date of Admission: 5/3/2023   Date of Service: 5/7/2023 I Hospital Day: 4    Assessment/Plan   * Kidney capsule rupture, left, initial encounter  Assessment & Plan  · Non-traumatic  · Patient was reportedly in his usual state of health when he awoke with sudden onset left abdominal/flank pain  Review of notes from [facility] -- he does have history of renal cysts/stones in the past  He is on Xarelto, and was given ASA by his wife prior to arrival to hospital   · CT A/P:  Large subcapsular hematoma compressing the left kidney measuring 13 5 x 15 8 x 9 7 cm and up to 6 2 cm in thickness  Linear hyperdensities within the posterior aspect of the hematoma compatible with active extravasation  There is surrounding perinephric and retroperitoneal hemorrhage  · Received Kcentra and DDAVP for reversal  · Resuscitated with blood products as below  · IR consulted for embolization / bleeding control  · Lower pole initially embolized and patient redeveloped shock  Returned to IR for repeat embolization of lower pole  · Now remains with stable hemodynamics and h/h    Plan:   · Continue 3 way potter, will need CBI if bloody UO noted  · If re-bleeds will likely need emergent nephrectomy for bleeding control  · Serial abdominal exams  · Trend hgb as below  · Urology consulted - input appreciated   · Monitor UO and renal indices closely      Acute blood loss anemia  Assessment & Plan  Lab Results   Component Value Date    HGB 7 9 (L) 05/07/2023    HGB 8 0 (L) 05/06/2023    HGB 7 6 (L) 05/06/2023    HGB 8 6 (L) 05/05/2023       · 2/2 kidney capsule rupture in the setting of coagulopathy on home Xarelto and ASA in route to ER  · Reversed with KCentra and DDAVP  · Total Blood products to date  · PRBCs 4 units  · FFP 4 units  · Transfuse for HGB < 7 or hemodynamic instability  · Required levophed for short period of time in IR -- weaned off prior to completion of procedure  · Maintain MAP > 65 mmHg  · Follow H&H    SHELIA (acute kidney injury) [facility])  Assessment & Plan  Lab Results   Component Value Date    CREATININE 1 76 (H) 05/07/2023    CREATININE 2 29 (H) 05/06/2023    CREATININE 2 39 (H) 05/05/2023     · 2/2 hemorrhagic shock, contrast exposure, left kidney capsule rupture  · Baseline creatinine 0 9, on admission 1 18 - improving  · 3-way potter placed in ER in anticipation of possible hematuria - none noted today 5/7/23    Plan:  · IVF discontinued  · CXR overnight with mild vascular congestion likely 2/2 volume resuscitation with blood products and IVF  · Maintain 3 way potter  · Monitor UO and renal indices  · Avoid hypotension, nephrotoxins  · Monitor fluid status     Coagulopathy (HCC)  Assessment & Plan  · INR 4 33 on arrival -- likely falsely elevated 2/2 Xarelto  Also received ASA by EMS prior to arrival   · Reversed on admission with Darrell Dao and DDAVP  · Holding AC/AP due to hemorrhagic shock  · INR most recently 1 67  · Hgb stable    Lab Results   Component Value Date    INR 1 67 (H) 05/04/2023    HGB 7 9 (L) 05/07/2023    HGB 8 0 (L) 05/06/2023    HGB 7 6 (L) 05/06/2023    HGB 8 6 (L) 05/05/2023         Paroxysmal atrial fibrillation (HCC)  Assessment & Plan  · Home regimen: Xarelto and metoprolol  · Holding Xarelto for now, in the setting of hemorrhagic shock/kidney capsule rupture  · Continue telemetry  · Patient is now hemodynamically stable s/p volume resuscitation with blood products and IR procedure for embolization  Was on vasopressors for short period of time during volume resuscitation  No longer on vasopressors  · Continue metoprolol  · Holding AC/AP 2/2 ABLA with shock on admission in the setting of renal capsule rupture    Leukemia ([facility] [address] )  Assessment & Plan  · S/p chemotherapy -- currently in remission for 14 years      Seizure disorder [facility])  Assessment & Plan  · Last seizure: July 2022 (was off medication "at time of seizure due to cost)  · Currently well controlled with Vimpat  · Continue home Vimpat  CAD (coronary artery disease)  Assessment & Plan  · Hx CABG 2020  · Hold AC/AP for now  · Hold Xarelto  · Continue metoprolol   · Continue statin  Continue labetalol as needed for hypertension    Type 2 diabetes mellitus [facility])  Assessment & Plan  Lab Results   Component Value Date    HGBA1C 5 9 (H) 04/20/2023       Recent Labs     05/06/23 2023 05/07/23  0014 05/07/23  0628 05/07/23  1147   POCGLU 109 104 136 109       Blood Sugar Average: Last 72 hrs:  (P) 134- Hold home medications   - Start sliding scale insulin q6h with q6h glucose checks             Bowel Regimen: colace  VTE Prophylaxis:Sequential compression device (Venodyne)  and Heparin     [facility]    Subjective   Chief Complaint: \"I'm having a hard time breathing\"    Subjective: 73M with spontaneous kidney rupture s/p embolization reports worsening shortness of breath, although patient reports symptoms are mild  He denies and pain currently and otherwise feels well  Denies history of chronic lung disease and denies chest pain, nausea, palpitations  He otherwise feels in his usual state of health  Objective   Vitals:   Temp:  [97 8 °F (36 6 °C)-99 2 °F (37 3 °C)] 97 8 °F (36 6 °C)  HR:  [114-128] 115  Resp:  [18-39] 39  BP: (140-158)/(73-92) 158/92    I/O       05/05 0701  05/06 0700 05/06 0701  05/07 0700 05/07 0701  05/08 0700    P  O  1060 180     I V  (mL/kg)       IV Piggyback       Total Intake(mL/kg) 1060 (8 5) 180 (1 6)     Urine (mL/kg/hr) 3550 (1 2) 2950 (1 1)     Total Output 3550 2950     Net -2494 -2770                   Physical Exam:   General Appearance:    No acute distress   HEENT:    Normocephalic, PERRL, Conjunctiva clear;  Sclera anicteric; No nasal drainage or bleeding; moist oral mucosa   Neck:   Supple, symmetrical, trachea midline, no adenopathy; No JVD     Back:     Symmetric, no abnormal " curvatures, no CVA tenderness   Lungs:     Mildly increased work of breathing  Wheezing throughout both lung fields  No rhonchi; No rales  Heart:    Regular rate and rhythm, Normal S1 and S2 heart sounds, no murmurs; No rubs or gallops  Abdomen:     Soft, non-tender, bowel sounds active all four quadrants, no obvious masses or organomegaly   Extremities:   Extremities normal, atraumatic, no cyanosis or edema, pulses symmetric to all extremities  Skin:   Normal skin color; Warm, dry, and intact; No diaphoresis; No jaundice; No lesions of concern  Neurologic:  Hard of hearing  CNII-XII grossly intact, normal motor strength, sensation to light touch intact and normal   Speech intact  Awake Alert and Oriented  Invasive Devices     Peripheral Intravenous Line  Duration           Peripheral IV 05/03/23 Left Antecubital 4 days    Peripheral IV 05/03/23 Right Antecubital 3 days          Drain  Duration           Urethral Catheter Non-latex 18 Fr  4 days                      Lab Results:   Results: I have personally reviewed all pertinent laboratory/tests results, BMP/CMP:   Lab Results   Component Value Date    SODIUM 138 05/07/2023    K 4 0 05/07/2023     05/07/2023    CO2 27 05/07/2023    BUN 42 (H) 05/07/2023    CREATININE 1 76 (H) 05/07/2023    CALCIUM 8 1 (L) 05/07/2023    EGFR 38 05/07/2023    and CBC:   Lab Results   Component Value Date    WBC 16 16 (H) 05/07/2023    HGB 7 9 (L) 05/07/2023    HCT 24 4 (L) 05/07/2023    MCV 98 05/07/2023     (L) 05/07/2023    MCH 31 6 05/07/2023    MCHC 32 4 05/07/2023    RDW 15 1 05/07/2023    MPV 10 8 05/07/2023     Imaging: I have personally reviewed pertinent reports  XR chest portable   Final Result by Valarie Collier MD (05/05 0900)      Small left and trace right effusion                 Workstation performed: [id number]         X-ray chest 1 view   Final Result by Cleo Tello MD (05/04 1637)      Satisfactory position of the endotracheal tube  Workstation performed: [id number]         IR embolization (specify vessel or site)   Final Result by Arslan Damon MD (05/04 0394)   Impression:   Active extravasation of contrast identified at the mid left kidney  Given patient's coagulopathy, the proximal and distal branches of the left renal artery was embolized using gelfoam and coils in alternating fashion  Completion left renal arteriogram showed no further perfusion of the left kidney  Completion aortogram and selective angiogram of the left L4 lumbar artery showed no evidence of active extravasation of contrast                Workstation performed: [id number]         CTA chest (pe study) abdomen pelvis routine contrast   Final Result by Robbie Mendez MD (05/03 6281)      Increased size of the known left perinephric hematoma status post left renal arterial embolization with a site of recurrent posterior perinephric active hemorrhage  Severe mass effect from the hematoma on the left kidney  There is also increased intra-abdominal hemorrhage  Small left and trace right pleural effusion; no acute pulmonary arterial embolism  Diffuse esophageal dilation with moderate circumferential thickening of the distal esophagus in keeping with a nonspecific esophagitis  I personally discussed this study with See Kaur on 5/3/2023 4:50 PM             Workstation performed: [id number]         IR embolization (specify vessel or site)   Final Result by Martin Valladares MD (05/03 8055)   Impression:      Active bleeding from the lower pole of the left kidney, numerous small arterial branches feeding this bleeding which is likely venous in origin        Embolization of 4 feeding branches with Gelfoam and coils      The kidney is compressed and distorted by the adjacent hematoma            Workstation performed: [id number]         CT abdomen pelvis with contrast   Final Result by Kanika Wagoner MD (05/03 1988)      1  Large subcapsular hematoma compressing the left kidney measuring approximately 13 5 x 15 8 x 9 7 cm and up to 6 2 cm in thickness  Linear hyperdensities within the posterior aspect of the hematoma compatible with active extravasation  There is    surrounding perinephric and retroperitoneal hemorrhage  2   Vague hypodensities are seen within the superior aspect of the spleen measuring up to 4 cm and 1 9 cm respectively  These areas may reflect splenic infarction less likely laceration  There are foci of hyperdensity at the superior aspect of the spleen    which was present on prior exam and likely represent a hemangioma versus calcification  3   Flattening of the IVC compatible with hypovolemia  4   Cholelithiasis without evidence of cholecystitis  5   Stable 2 1 cm cystic lesion within the body of the pancreas  6   Dense opacity in the left lower lobe which may be due to atelectasis versus pneumonia  Small left pleural effusion  I personally discussed the left renal and splenic findings of this study with Nnamdi Freeman on 5/3/2023 3:17 AM                   Workstation performed: [id number]         XR chest 1 view portable   Final Result by Paula Arias MD (05/03 6421)      No free air is seen under the diaphragms   There is a small left effusion and mild bibasilar subsegmental atelectasis                  Workstation performed: [id number]         XR abdomen 1 view kub   Final Result by Paula Arias MD (05/03 7277)      No evidence of bowel obstruction               Workstation performed: [id number]         XR chest portable    (Results Pending)       Other Studies: n/a

## 2023-05-07 NOTE — ASSESSMENT & PLAN NOTE
· Non-traumatic  · Patient was reportedly in his usual state of health when he awoke with sudden onset left abdominal/flank pain  Review of notes from [facility] -- he does have history of renal cysts/stones in the past  He is on Xarelto, and was given ASA by his wife prior to arrival to hospital   · CT A/P:  Large subcapsular hematoma compressing the left kidney measuring 13 5 x 15 8 x 9 7 cm and up to 6 2 cm in thickness  Linear hyperdensities within the posterior aspect of the hematoma compatible with active extravasation  There is surrounding perinephric and retroperitoneal hemorrhage  · Received Kcentra and DDAVP for reversal  · Resuscitated with blood products as below  · IR consulted for embolization / bleeding control  · Lower pole initially embolized and patient redeveloped shock  Returned to IR for repeat embolization of lower pole  · Now remains with stable hemodynamics and h/h    Plan:   · Continue 3 way potter, will need CBI if bloody UO noted  · If re-bleeds will likely need emergent nephrectomy for bleeding control  · Serial abdominal exams  · Trend hgb as below  · Urology consulted - input appreciated   · Monitor UO and renal indices closely

## 2023-05-07 NOTE — RESPIRATORY THERAPY NOTE
"RT Ventilator Management Note  Petrapetrona Acevedojuan II 72 y o  male MRN: [id number]  Unit/Bed#: W -01 Encounter: [id number]          Resp Comments: (P) RN requested tx for pt  \"for pt tacpneic mid 30's\"  Tx made no differance in pt breating or upperairway wheezing      "

## 2023-05-07 NOTE — ASSESSMENT & PLAN NOTE
Lab Results   Component Value Date    HGB 7 9 (L) 05/07/2023    HGB 8 0 (L) 05/06/2023    HGB 7 6 (L) 05/06/2023    HGB 8 6 (L) 05/05/2023       · 2/2 kidney capsule rupture in the setting of coagulopathy on home Xarelto and ASA in route to ER  · Reversed with KCentra and DDAVP  · Total Blood products to date  · PRBCs 4 units  · FFP 4 units  · Transfuse for HGB < 7 or hemodynamic instability  · Required levophed for short period of time in IR -- weaned off prior to completion of procedure  · Maintain MAP > 65 mmHg  · Follow H&H

## 2023-05-07 NOTE — CASE MANAGEMENT
Case Management Discharge Planning Note    Patient name Philip Smith  Location W /W -89 MRN [id number]  : 1950 Date 2023       Current Admission Date: 5/3/2023  Current Admission Diagnosis:Kidney capsule rupture, left, initial encounter   Patient Active Problem List    Diagnosis Date Noted   • Acute blood loss anemia 2023   • Elevated troponin 2023   • Coagulopathy ([facility] [address] ) 2023   • SHELIA (acute kidney injury) ([facility] [address] ) 2023   • Kidney capsule rupture, left, initial encounter 2023   • Type 2 diabetes mellitus ([facility] [address] ) 2023   • CAD (coronary artery disease) 2023   • Seizure disorder ([facility] [address] ) 2023   • Leukemia ([facility] [address] ) 2023   • Splenic hemorrhage 2023   • Paroxysmal atrial fibrillation ([facility] [address] ) 2023      LOS (days): 4  Geometric Mean LOS (GMLOS) (days):   Days to GMLOS:     OBJECTIVE:  Risk of Unplanned Readmission Score: 22 67         Current admission status: Inpatient   Preferred Pharmacy:   [facility] [id number]  Phone: [phone] Fax: [phone]    Primary Care Provider: Yasemin Gregory DO    Primary Insurance: [facility]  Secondary Insurance:     DISCHARGE DETAILS:  CM spoke with patient's spouse at the bedside for 45 minutes  CM introduced self and role  CM reviewed with spouse at the bedside per PT, the recommendation at this time is inpatient rehab  CM discussed acute, subacute and HHC at bedside  Patient's spouse discussed with CM past history of hospitalizations  Patient's spouse discussed with CM her current OP therapy experience  CM discussed with spouse having PT see patient again for treat at bedside  Per spouse, last PT treat patient had to stop due to his numbers  CM will f/u with patient/spouse re:PT treat  Spouse stated she is primary contact, not her daughter       CM sent blanket referrals to inpatient rehab, acute rehab and home health for options

## 2023-05-08 ENCOUNTER — APPOINTMENT (INPATIENT)
Dept: ULTRASOUND IMAGING | Facility: HOSPITAL | Age: 73
End: 2023-05-08

## 2023-05-08 ENCOUNTER — APPOINTMENT (INPATIENT)
Dept: RADIOLOGY | Facility: HOSPITAL | Age: 73
End: 2023-05-08

## 2023-05-08 PROBLEM — E80.6 HYPERBILIRUBINEMIA: Status: ACTIVE | Noted: 2023-05-08

## 2023-05-08 LAB
ALBUMIN SERPL BCP-MCNC: 3.1 G/DL (ref 3.5–5)
ALP SERPL-CCNC: 277 U/L (ref 34–104)
ALT SERPL W P-5'-P-CCNC: 93 U/L (ref 7–52)
AMMONIA PLAS-SCNC: 24 UMOL/L (ref 18–72)
ANION GAP SERPL CALCULATED.3IONS-SCNC: 6 MMOL/L (ref 4–13)
ANISOCYTOSIS BLD QL SMEAR: PRESENT
AST SERPL W P-5'-P-CCNC: 107 U/L (ref 13–39)
ATRIAL RATE: 109 BPM
BACTERIA BLD CULT: NORMAL
BACTERIA BLD CULT: NORMAL
BASOPHILS # BLD MANUAL: 0 THOUSAND/UL (ref 0–0.1)
BASOPHILS NFR MAR MANUAL: 0 % (ref 0–1)
BILIRUB DIRECT SERPL-MCNC: 6.05 MG/DL (ref 0–0.2)
BILIRUB SERPL-MCNC: 8.94 MG/DL (ref 0.2–1)
BUN SERPL-MCNC: 37 MG/DL (ref 5–25)
CALCIUM SERPL-MCNC: 8.2 MG/DL (ref 8.4–10.2)
CHLORIDE SERPL-SCNC: 100 MMOL/L (ref 96–108)
CO2 SERPL-SCNC: 31 MMOL/L (ref 21–32)
CREAT SERPL-MCNC: 1.41 MG/DL (ref 0.6–1.3)
EOSINOPHIL # BLD MANUAL: 0 THOUSAND/UL (ref 0–0.4)
EOSINOPHIL NFR BLD MANUAL: 0 % (ref 0–6)
ERYTHROCYTE [DISTWIDTH] IN BLOOD BY AUTOMATED COUNT: 14.8 % (ref 11.6–15.1)
GFR SERPL CREATININE-BSD FRML MDRD: 49 ML/MIN/1.73SQ M
GGT SERPL-CCNC: 171 U/L (ref 5–85)
GLUCOSE SERPL-MCNC: 121 MG/DL (ref 65–140)
GLUCOSE SERPL-MCNC: 122 MG/DL (ref 65–140)
GLUCOSE SERPL-MCNC: 126 MG/DL (ref 65–140)
GLUCOSE SERPL-MCNC: 136 MG/DL (ref 65–140)
GLUCOSE SERPL-MCNC: 136 MG/DL (ref 65–140)
GLUCOSE SERPL-MCNC: 137 MG/DL (ref 65–140)
GLUCOSE SERPL-MCNC: 139 MG/DL (ref 65–140)
HCT VFR BLD AUTO: 25.2 % (ref 36.5–49.3)
HGB BLD-MCNC: 8.3 G/DL (ref 12–17)
HYPERCHROMIA BLD QL SMEAR: PRESENT
LIPASE SERPL-CCNC: 27 U/L (ref 11–82)
LYMPHOCYTES # BLD AUTO: 0.7 THOUSAND/UL (ref 0.6–4.47)
LYMPHOCYTES # BLD AUTO: 5 % (ref 14–44)
MCH RBC QN AUTO: 32 PG (ref 26.8–34.3)
MCHC RBC AUTO-ENTMCNC: 32.9 G/DL (ref 31.4–37.4)
MCV RBC AUTO: 97 FL (ref 82–98)
MONOCYTES # BLD AUTO: 1.39 THOUSAND/UL (ref 0–1.22)
MONOCYTES NFR BLD: 10 % (ref 4–12)
MYELOCYTES NFR BLD MANUAL: 3 % (ref 0–1)
NEUTROPHILS # BLD MANUAL: 11.44 THOUSAND/UL (ref 1.85–7.62)
NEUTS SEG NFR BLD AUTO: 82 % (ref 43–75)
P AXIS: 28 DEGREES
PLATELET # BLD AUTO: 128 THOUSANDS/UL (ref 149–390)
PLATELET BLD QL SMEAR: ABNORMAL
PMV BLD AUTO: 10.5 FL (ref 8.9–12.7)
POTASSIUM SERPL-SCNC: 4.1 MMOL/L (ref 3.5–5.3)
PR INTERVAL: 168 MS
PROT SERPL-MCNC: 5.4 G/DL (ref 6.4–8.4)
QRS AXIS: 104 DEGREES
QRSD INTERVAL: 114 MS
QT INTERVAL: 336 MS
QTC INTERVAL: 452 MS
RBC # BLD AUTO: 2.59 MILLION/UL (ref 3.88–5.62)
RBC MORPH BLD: PRESENT
SODIUM SERPL-SCNC: 137 MMOL/L (ref 135–147)
T WAVE AXIS: -55 DEGREES
VENTRICULAR RATE: 109 BPM
WBC # BLD AUTO: 13.95 THOUSAND/UL (ref 4.31–10.16)

## 2023-05-08 RX ORDER — PANTOPRAZOLE SODIUM 40 MG/1
40 TABLET, DELAYED RELEASE ORAL EVERY 12 HOURS SCHEDULED
Status: DISCONTINUED | OUTPATIENT
Start: 2023-05-08 | End: 2023-05-15 | Stop reason: HOSPADM

## 2023-05-08 RX ORDER — LEVALBUTEROL INHALATION SOLUTION 1.25 MG/3ML
1.25 SOLUTION RESPIRATORY (INHALATION)
Status: DISCONTINUED | OUTPATIENT
Start: 2023-05-08 | End: 2023-05-14

## 2023-05-08 RX ORDER — ACETAMINOPHEN 325 MG/1
650 TABLET ORAL EVERY 6 HOURS PRN
Status: DISCONTINUED | OUTPATIENT
Start: 2023-05-08 | End: 2023-05-15 | Stop reason: HOSPADM

## 2023-05-08 RX ADMIN — ACETAMINOPHEN 975 MG: 325 TABLET ORAL at 15:15

## 2023-05-08 RX ADMIN — METHOCARBAMOL TABLETS 500 MG: 500 TABLET, COATED ORAL at 21:45

## 2023-05-08 RX ADMIN — LACOSAMIDE 100 MG: 100 TABLET, FILM COATED ORAL at 11:20

## 2023-05-08 RX ADMIN — SENNOSIDES 8.6 MG: 8.6 TABLET, FILM COATED ORAL at 21:46

## 2023-05-08 RX ADMIN — METHOCARBAMOL TABLETS 500 MG: 500 TABLET, COATED ORAL at 06:44

## 2023-05-08 RX ADMIN — LABETALOL HYDROCHLORIDE 20 MG: 5 INJECTION, SOLUTION INTRAVENOUS at 11:08

## 2023-05-08 RX ADMIN — LEVALBUTEROL HYDROCHLORIDE 1.25 MG: 1.25 SOLUTION RESPIRATORY (INHALATION) at 14:04

## 2023-05-08 RX ADMIN — EZETIMIBE 10 MG: 10 TABLET ORAL at 16:37

## 2023-05-08 RX ADMIN — DOCUSATE SODIUM 100 MG: 100 CAPSULE, LIQUID FILLED ORAL at 17:32

## 2023-05-08 RX ADMIN — CHLORHEXIDINE GLUCONATE 0.12% ORAL RINSE 15 ML: 1.2 LIQUID ORAL at 11:20

## 2023-05-08 RX ADMIN — CHLORHEXIDINE GLUCONATE 0.12% ORAL RINSE 15 ML: 1.2 LIQUID ORAL at 21:46

## 2023-05-08 RX ADMIN — Medication 2.5 MG: at 16:37

## 2023-05-08 RX ADMIN — PANTOPRAZOLE SODIUM 40 MG: 40 TABLET, DELAYED RELEASE ORAL at 21:50

## 2023-05-08 RX ADMIN — DOCUSATE SODIUM 100 MG: 100 CAPSULE, LIQUID FILLED ORAL at 11:20

## 2023-05-08 RX ADMIN — HEPARIN SODIUM 5000 UNITS: 5000 INJECTION INTRAVENOUS; SUBCUTANEOUS at 06:44

## 2023-05-08 RX ADMIN — HEPARIN SODIUM 5000 UNITS: 5000 INJECTION INTRAVENOUS; SUBCUTANEOUS at 21:46

## 2023-05-08 RX ADMIN — LIDOCAINE 5% 1 PATCH: 700 PATCH TOPICAL at 11:20

## 2023-05-08 RX ADMIN — ACETAMINOPHEN 975 MG: 325 TABLET ORAL at 06:44

## 2023-05-08 RX ADMIN — METHOCARBAMOL TABLETS 500 MG: 500 TABLET, COATED ORAL at 15:16

## 2023-05-08 RX ADMIN — PANTOPRAZOLE SODIUM 40 MG: 40 TABLET, DELAYED RELEASE ORAL at 11:20

## 2023-05-08 RX ADMIN — HEPARIN SODIUM 5000 UNITS: 5000 INJECTION INTRAVENOUS; SUBCUTANEOUS at 15:26

## 2023-05-08 RX ADMIN — METOPROLOL TARTRATE 25 MG: 25 TABLET, FILM COATED ORAL at 21:46

## 2023-05-08 RX ADMIN — METOPROLOL TARTRATE 25 MG: 25 TABLET, FILM COATED ORAL at 11:08

## 2023-05-08 RX ADMIN — PRAVASTATIN SODIUM 80 MG: 80 TABLET ORAL at 16:37

## 2023-05-08 RX ADMIN — LACOSAMIDE 100 MG: 100 TABLET, FILM COATED ORAL at 21:46

## 2023-05-08 NOTE — ASSESSMENT & PLAN NOTE
-requiring 3L NC oxygen to maintain SpO2>92%  -Lung sounds with bilateral upper wheezing, not resolved with levalbuterol, atrovent, racemic epinephrine, or lasix  -CT chest with moderate left pleural effusion, small right pleural effusion  -consider thoracentesis  -wean oxygen as tolerated

## 2023-05-08 NOTE — PROGRESS NOTES
The pantoprazole has been converted to Oral per [facility] IV-to-PO Auto-Conversion Protocol for Adults as approved by the Pharmacy and Therapeutics Committee  The patient met all eligible criteria:    3 Age = 25years old   2) Received at least one dose of the IV form   3) Receiving at least one other scheduled oral/enteral medication   4) Tolerating an oral/enteral diet     and did not have any exclusions:     1) Critical care patient   2) Active GI bleed (IF assessing H2RAs or PPIs)   3) Continuous tube feeding (IF assessing cipro, doxycycline, levofloxacin, minocycline, rifampin, or voriconazole)   4) Receiving PO vancomycin (IF assessing metronidazole)   5) Persistent nausea and/or vomiting   6) Ileus or gastrointestinal obstruction   7) Delroy/nasogastric tube set for continuous suction   8) Specific order not to automatically convert to PO (in the order's comments or if discussed in the most recent Infectious Disease or primary team's progress notes)

## 2023-05-08 NOTE — PLAN OF CARE
Problem: OCCUPATIONAL THERAPY ADULT  Goal: Performs self-care activities at highest level of function for planned discharge setting  See evaluation for individualized goals  Description: Treatment Interventions: ADL retraining, Functional transfer training, UE strengthening/ROM, Endurance training, Patient/family training, Equipment evaluation/education, Fine motor coordination activities, Compensatory technique education, Energy conservation          See flowsheet documentation for full assessment, interventions and recommendations  5/8/2023 1235 by Brittany Chau OT  Outcome: Not Progressing  Note: Limitation: Decreased ADL status, Decreased endurance, Decreased high-level ADLs, Decreased self-care trans (dec'd IADL, dec'd functional reach, dec'd standing tolearnce, generalized deconditioning,)  Prognosis: Good  Assessment: Patient seen for OT treatment on 5/8/2023 s/p admission for Kidney capsule rupture, left, initial encounter Patient presents with active orders for OT eval and treat  Upon arrival, pt found resting in bed showing no signs of distress  Patient agreeable to OT session and eager for OOB mobility  Patient participated in bed mobility  , but unable to participate in OOB mobility , LB dressing 2/2 elevated BP readings (190/91), RN made aware  Freddie Nye II  is continuing to perform below baseline due to the following deficits: endurance , decreased activity tolerance  and (+) pain ; further limited by medical status  From OT standpoint, patient would benefit from skilled intervention to maximize independence with ADLs and functional mobility  Plan to f/u with pt following improved BP  Goals remain appropriate, continue POC   At this time, recommending D/C to: post-acute rehabilitation     OT Discharge Recommendation: Post acute rehabilitation services     Brittany Chau

## 2023-05-08 NOTE — RESPIRATORY THERAPY NOTE
RT Note  Pilo Samuel II 72 y o  male MRN: [id number]  Unit/Bed#: W -01 Encounter: [id number]        Resp Comments: (P) trauma requested that pt get tx due to resp status  pt given tx, no changes  Pt being upgraded to Sue Atkinson

## 2023-05-08 NOTE — ASSESSMENT & PLAN NOTE
· INR 4 33 on arrival -- likely falsely elevated 2/2 Xarelto    Also received ASA by EMS prior to arrival   · Reversed on admission with [facility] and DDAVP  · Holding AC/AP due to hemorrhagic shock  · INR most recently 1 67  · Hgb stable    Lab Results   Component Value Date    INR 1 67 (H) 05/04/2023    HGB 7 9 (L) 05/07/2023    HGB 8 0 (L) 05/06/2023    HGB 7 6 (L) 05/06/2023    HGB 8 6 (L) 05/05/2023

## 2023-05-08 NOTE — PLAN OF CARE
Problem: MOBILITY - ADULT  Goal: Maintain or return to baseline ADL function  Description: INTERVENTIONS:  -  Assess patient's ability to carry out ADLs; assess patient's baseline for ADL function and identify physical deficits which impact ability to perform ADLs (bathing, care of mouth/teeth, toileting, grooming, dressing, etc )  - Assess/evaluate cause of self-care deficits   - Assess range of motion  - Assess patient's mobility; develop plan if impaired  - Assess patient's need for assistive devices and provide as appropriate  - Encourage maximum independence but intervene and supervise when necessary  - Involve family in performance of ADLs  - Assess for home care needs following discharge   - Consider OT consult to assist with ADL evaluation and planning for discharge  - Provide patient education as appropriate  Outcome: Progressing  Goal: Maintains/Returns to pre admission functional level  Description: INTERVENTIONS:  - Perform BMAT or MOVE assessment daily    - Set and communicate daily mobility goal to care team and patient/family/caregiver  - Collaborate with rehabilitation services on mobility goals if consulted  - Out of bed for toileting  - Record patient progress and toleration of activity level   Outcome: Progressing     Problem: Nutrition/Hydration-ADULT  Goal: Nutrient/Hydration intake appropriate for improving, restoring or maintaining nutritional needs  Description: Monitor and assess patient's nutrition/hydration status for malnutrition  Collaborate with interdisciplinary team and initiate plan and interventions as ordered  Monitor patient's weight and dietary intake as ordered or per policy  Utilize nutrition screening tool and intervene as necessary  Determine patient's food preferences and provide high-protein, high-caloric foods as appropriate       INTERVENTIONS:  - Monitor oral intake, urinary output, labs, and treatment plans  - Assess nutrition and hydration status and recommend course of action  - Evaluate amount of meals eaten  - Assist patient with eating if necessary   - Allow adequate time for meals  - Recommend/ encourage appropriate diets, oral nutritional supplements, and vitamin/mineral supplements  - Order, calculate, and assess calorie counts as needed  - Recommend, monitor, and adjust tube feedings and TPN/PPN based on assessed needs  - Assess need for intravenous fluids  - Provide specific nutrition/hydration education as appropriate  - Include patient/family/caregiver in decisions related to nutrition  Outcome: Progressing     Problem: Prexisting or High Potential for Compromised Skin Integrity  Goal: Skin integrity is maintained or improved  Description: INTERVENTIONS:  - Identify patients at risk for skin breakdown  - Assess and monitor skin integrity  - Assess and monitor nutrition and hydration status  - Monitor labs   - Assess for incontinence   - Turn and reposition patient  - Assist with mobility/ambulation  - Relieve pressure over bony prominences  - Avoid friction and shearing  - Provide appropriate hygiene as needed including keeping skin clean and dry  - Evaluate need for skin moisturizer/barrier cream  - Collaborate with interdisciplinary team   - Patient/family teaching  - Consider wound care consult   Outcome: Progressing     Problem: SAFETY,RESTRAINT: NV/NON-SELF DESTRUCTIVE BEHAVIOR  Goal: Remains free of harm/injury (restraint for non violent/non self-detsructive behavior)  Description: INTERVENTIONS:  - Instruct patient/family regarding restraint use   - Assess and monitor physiologic and psychological status   - Provide interventions and comfort measures to meet assessed patient needs   - Identify and implement measures to help patient regain control  - Assess readiness for release of restraint   Outcome: Progressing  Goal: Returns to optimal restraint-free functioning  Description: INTERVENTIONS:  - Assess the patient's behavior and symptoms that indicate continued need for restraint  - Identify and implement measures to help patient regain control  - Assess readiness for release of restraint   Outcome: Progressing

## 2023-05-08 NOTE — PROGRESS NOTES
[facility]  Progress Note  Name: Anne Rodas  MRN: [id number]  Unit/Bed#: W -01 I Date of Admission: 5/3/2023   Date of Service: 5/8/2023 I Hospital Day: 5    Assessment/Plan   * Kidney capsule rupture, left, initial encounter  Assessment & Plan  · Non-traumatic  · Patient was reportedly in his usual state of health when he awoke with sudden onset left abdominal/flank pain  Review of notes from [facility] -- he does have history of renal cysts/stones in the past  He is on Xarelto, and was given ASA by his wife prior to arrival to hospital   · CT A/P:  Large subcapsular hematoma compressing the left kidney measuring 13 5 x 15 8 x 9 7 cm and up to 6 2 cm in thickness  Linear hyperdensities within the posterior aspect of the hematoma compatible with active extravasation  There is surrounding perinephric and retroperitoneal hemorrhage  · Received Kcentra and DDAVP for reversal  · Resuscitated with blood products as below  · IR consulted for embolization / bleeding control  · Lower pole initially embolized and patient redeveloped shock  Returned to IR for repeat embolization of lower pole  · Now remains with stable hemodynamics and h/h    Plan:   · Continue 3 way potter, will need CBI if bloody UO noted  · If re-bleeds will likely need emergent nephrectomy for bleeding control  · Serial abdominal exams  · Trend hgb as below  · Urology consulted - input appreciated   · Urology recommends outpatient MRI of left kidney, f/u in 6 weeks with urology  · Monitor UO and renal indices closely      Acute respiratory insufficiency  Assessment & Plan  -requiring 3L NC oxygen to maintain SpO2>92%  -Lung sounds with bilateral upper wheezing, not resolved with levalbuterol, atrovent, racemic epinephrine, or lasix  -CT chest with moderate left pleural effusion, small right pleural effusion  -consider thoracentesis  -wean oxygen as tolerated    Hyperbilirubinemia  Assessment & Plan  -noted 5/8  -no history of hepatitis, cirrhosis, alcohol use, or LFT abnormalities  -denies RUQ abdominal pain  -direct T bili 8 94, D bili 6 05  -, , ALT 93  -RUQ US pending  -will obtain GGT, ammonia, lipase  -repeat CMP in am      Acute blood loss anemia  Assessment & Plan  Lab Results   Component Value Date    HGB 7 9 (L) 05/07/2023    HGB 8 0 (L) 05/06/2023    HGB 7 6 (L) 05/06/2023    HGB 8 6 (L) 05/05/2023       · 2/2 kidney capsule rupture in the setting of coagulopathy on home Xarelto and ASA in route to ER  · Reversed with Freddrick Swain and DDAVP  · Total Blood products to date  · PRBCs 4 units  · FFP 4 units  · Transfuse for HGB < 7 or hemodynamic instability  · Required levophed for short period of time in IR -- weaned off prior to completion of procedure  · Maintain MAP > 65 mmHg  · Follow H&H    Coagulopathy (HCC)  Assessment & Plan  · INR 4 33 on arrival -- likely falsely elevated 2/2 Xarelto    Also received ASA by EMS prior to arrival   · Reversed on admission with Freddrick Swain and DDAVP  · Holding AC/AP due to hemorrhagic shock  · INR most recently 1 67  · Hgb stable    Lab Results   Component Value Date    INR 1 67 (H) 05/04/2023    HGB 7 9 (L) 05/07/2023    HGB 8 0 (L) 05/06/2023    HGB 7 6 (L) 05/06/2023    HGB 8 6 (L) 05/05/2023         SHELIA (acute kidney injury) [facility])  Assessment & Plan  Lab Results   Component Value Date    CREATININE 1 76 (H) 05/07/2023    CREATININE 2 29 (H) 05/06/2023    CREATININE 2 39 (H) 05/05/2023     · 2/2 hemorrhagic shock, contrast exposure, left kidney capsule rupture  · Baseline creatinine 0 9, on admission 1 18 - improving  · 3-way potter placed in ER in anticipation of possible hematuria - none noted today 5/7/23    Plan:  · IVF discontinued  · CXR overnight with mild vascular congestion likely 2/2 volume resuscitation with blood products and IVF  · Maintain 3 way potter  · Monitor UO and renal indices  · Avoid hypotension, nephrotoxins  · Monitor fluid status     Paroxysmal atrial fibrillation (HCC)  Assessment & Plan  · Home regimen: Xarelto and metoprolol  · Holding Xarelto for now, in the setting of hemorrhagic shock/kidney capsule rupture  · Continue telemetry  · Patient is now hemodynamically stable s/p volume resuscitation with blood products and IR procedure for embolization  Was on vasopressors for short period of time during volume resuscitation  No longer on vasopressors  · Continue metoprolol  · Holding AC/AP 2/2 ABLA with shock on admission in the setting of renal capsule rupture    Leukemia ([facility] [address] )  Assessment & Plan  · S/p chemotherapy -- currently in remission for 14 years  Seizure disorder [facility])  Assessment & Plan  · Last seizure: July 2022 (was off medication at time of seizure due to cost)  · Currently well controlled with Vimpat  · Continue home Vimpat  CAD (coronary artery disease)  Assessment & Plan  · Hx CABG 2020  · Hold AC/AP for now  · Hold Xarelto  · Continue metoprolol   · Continue statin  Continue labetalol as needed for hypertension    Type 2 diabetes mellitus [facility])  Assessment & Plan  Lab Results   Component Value Date    HGBA1C 5 9 (H) 04/20/2023       Recent Labs     05/07/23  1147 05/07/23  1756 05/08/23  0039 05/08/23  0651   POCGLU 109 130 139 126       Blood Sugar Average: Last 72 hrs:  (P) 016 [id number]- Hold home medications   - Start sliding scale insulin q6h with q6h glucose checks           Bowel Regimen: Senna, colace  VTE Prophylaxis:Heparin     Disposition: Pending clinical stability, resolution of respiratory insufficiency, resolution of SHELIA, voiding trial, and workup for conjugated hyperbilirubinemia    Subjective   Chief Complaint: Left lower abdominal pain    Subjective:   Pt awake, pleasant, states that he has continued LLQ hip pain however that it is improved somewhat    Also notes continued shortness of breath this admission, states that he never had breathing issues in the past, is not a smoker and has not had any occupational exposures which would contribute to potential lung disease  Is jaundiced, however denies history of prior liver disease, does not drink alcohol, no previous episodes of jaundice  Objective   Vitals:   Temp:  [98 °F (36 7 °C)-98 6 °F (37 °C)] 98 °F (36 7 °C)  HR:  [] 86  Resp:  [18-19] 19  BP: (144-194)/() 163/89    I/O       05/06 0701  05/07 0700 05/07 0701  05/08 0700 05/08 0701  05/09 0700    P  O  180 240     Total Intake(mL/kg) 180 (1 6) 240 (2 2)     Urine (mL/kg/hr) 2950 (1 1) 900 (0 3)     Total Output 2950 900     Net -6800 -855                   Physical Exam:   GENERAL APPEARANCE: Awake, alert, pleasant  NEURO: Alert and oriented x4  HEENT: Normocephalic, PERRL, mild scleral icterus, moist oral mucosa  CV: RRR, normal S1/S2, no murmurs, rubs, or gallops, 2+ radial and DP pulses bilaterally  LUNGS: Tachypnea with mild increased work of breathing, faint biphasic wheezes appreciated in the bilateral upper lung fields, no rhonchi or rales appreciated  GI: Mild tenderness to palpation in the LLQ without rebound or guarding, BS normoactive throughout, ecchymosis noted over left flank, no CVA tenderness bilaterally  : Huggins catheter in place,draining yellow urine without cas blood  MSK: Moving all extremities spontaneously, no evidence of trauma  SKIN: Warm, dry, intact, no diaphoresis or rash  Jaundice noted throughout face, upper trunk, and BLE  Invasive Devices     Peripheral Intravenous Line  Duration           Peripheral IV 05/08/23 Distal;Dorsal (posterior); Right Forearm <1 day          Drain  Duration           Urethral Catheter Non-latex 18 Fr  5 days                      Lab Results:   Results: I have personally reviewed all pertinent laboratory/tests results, BMP/CMP:   Lab Results   Component Value Date    SODIUM 137 05/08/2023    K 4 1 05/08/2023     05/08/2023    CO2 31 05/08/2023    BUN 37 (H) 05/08/2023    CREATININE 1 41 (H) 05/08/2023    CALCIUM 8 2 (L) 05/08/2023     (H) 05/08/2023    ALT 93 (H) 05/08/2023    ALKPHOS 277 (H) 05/08/2023    EGFR 49 05/08/2023   , CBC:   Lab Results   Component Value Date    WBC 13 95 (H) 05/08/2023    HGB 8 3 (L) 05/08/2023    HCT 25 2 (L) 05/08/2023    MCV 97 05/08/2023     (L) 05/08/2023    MCH 32 0 05/08/2023    MCHC 32 9 05/08/2023    RDW 14 8 05/08/2023    MPV 10 5 05/08/2023   , AST:   Lab Results   Component Value Date     (H) 05/08/2023   , ALT:   Lab Results   Component Value Date    ALT 93 (H) 05/08/2023    and   D bili: 6 05  T Bili: 8 94  ALP: 277  Imaging: I have personally reviewed pertinent reports  and I have personally reviewed pertinent films in PACS

## 2023-05-08 NOTE — ASSESSMENT & PLAN NOTE
· Non-traumatic  · Patient was reportedly in his usual state of health when he awoke with sudden onset left abdominal/flank pain  Review of notes from [facility] -- he does have history of renal cysts/stones in the past  He is on Xarelto, and was given ASA by his wife prior to arrival to hospital   · CT A/P:  Large subcapsular hematoma compressing the left kidney measuring 13 5 x 15 8 x 9 7 cm and up to 6 2 cm in thickness  Linear hyperdensities within the posterior aspect of the hematoma compatible with active extravasation  There is surrounding perinephric and retroperitoneal hemorrhage  · Received Kcentra and DDAVP for reversal  · Resuscitated with blood products as below  · IR consulted for embolization / bleeding control  · Lower pole initially embolized and patient redeveloped shock  Returned to IR for repeat embolization of lower pole  · Now remains with stable hemodynamics and h/h    Plan:   · Continue 3 way potter, will need CBI if bloody UO noted  · If re-bleeds will likely need emergent nephrectomy for bleeding control  · Serial abdominal exams  · Trend hgb as below  · Urology consulted - input appreciated   · Urology recommends outpatient MRI of left kidney, f/u in 6 weeks with urology  · Monitor UO and renal indices closely

## 2023-05-08 NOTE — PHYSICAL THERAPY NOTE
PHYSICAL THERAPY TREATMENT NOTE          Patient Name: Larry Garcia Date: 23 1050   PT Last Visit   PT Visit Date 23   Note Type   Note Type Treatment for insurance authorization   Pain Assessment   Pain Assessment Tool FLACC   Pain Location/Orientation Orientation: Left; Location: Hip   Pain Onset/Description Onset: Ongoing; Descriptor: Aching  (pt reports arthritis)   Effect of Pain on Daily Activities limits ease of mobility   Hospital Pain Intervention(s) Repositioned; Ambulation/increased activity   Pain Rating: FLACC (Rest) - Face 0   Pain Rating: FLACC (Rest) - Legs 0   Pain Rating: FLACC (Rest) - Activity 0   Pain Rating: FLACC (Rest) - Cry 0   Pain Rating: FLACC (Rest) - Consolability 0   Score: FLACC (Rest) 0   Pain Rating: FLACC (Activity) - Face 1   Pain Rating: FLACC (Activity) - Legs 0   Pain Rating: FLACC (Activity) - Activity 1   Pain Rating: FLACC (Activity) - Cry 1   Pain Rating: FLACC (Activity) - Consolability 1   Score: FLACC (Activity) 4   Restrictions/Precautions   Weight Bearing Precautions Per Order No   Other Precautions Cognitive; Chair Alarm; Bed Alarm;O2;Hard of hearing  (3L O2 via NC)   General   Chart Reviewed Yes   Additional Pertinent History (S)  Pt c/o lightheadedness/dizziness while sitting at EOB, BP readin/109, 193/109, and 194/108  Fruther mobility deferred, PANCHO Hinojosa notified and arrived to room, [facility] AP [address] [place] [address] notified via TT   Family/Caregiver Present No   Cognition   Overall Cognitive Status Impaired   Arousal/Participation Alert; Cooperative   Attention Attends with cues to redirect   Orientation Level Oriented X4   Following Commands Follows one step commands without difficulty   Comments pt ID via name and ; pt agreeable and motivated for OOB mobility   Bed Mobility   Supine to Sit 3  Moderate assistance   Additional items Assist x 1;HOB elevated; Bedrails; Increased time required   Sit to Supine 3  Moderate assistance   Additional items Assist x 1;HOB elevated; Bedrails; Increased time required;Verbal cues   Additional Comments pt able to maintain sitting balance at EOB w/ supervision  further mobility deferred at this time due to pt's BP readings   Transfers   Additional Comments OOB mobility deferred due to hypertension w/ pt c/o lightheadedness/dizziness   Balance   Static Sitting Fair +   Dynamic Sitting Fair   Activity Tolerance   Activity Tolerance Treatment limited secondary to medical complications (Comment)  (BP)   Nurse Made Aware RN Micaela   Assessment   Prognosis Fair   Problem List Decreased strength;Decreased endurance; Impaired balance;Decreased mobility;Pain   Assessment Pt seen for PT treatment session today w/ pt agreeable to participate  Pt able to performed supine<>sit mobility w/ mod Ax1, an improvement from previous sessions  However, OOB mobility deferred at this time due to pt's BP readings >190/100 w/ pt c/o lightheadedness/dizziness while sitting at EOB  RN notified and arrived to room  Pt continues to be functioning below baseline level, and remains limited in functional mobility due to medical complications, impaired balance, decreased overall activity tolerance  Pt will continue to benefit from skilled PT intervention to promote pt's independence w/ functional mobility and progress towards set goals  Continue to recommend DC post acute rehabilitation services when medically cleared  Barriers to Discharge Inaccessible home environment   Goals   Patient Goals to get up and walk around   STG Expiration Date 05/14/23   Short Term Goal #1 In 10 days pt will be able to: 1  Demonstrate ability to perform all aspects of bed mobility independently to improve functional safety  2  Perform functional transfers independently to facilitate safe return to previous living environment    3   Ambulate 150 ft with LRAD and supervision with stable vitals to improve safety with household distances and reduce fall risk  4  Improve LE strength grades by 1 to increase ease of functional mobility with transfers and gait  5  Pt will demonstrate improved balance by one grade in order to decrease risk of falls  6  Climb 7 steps with 1 HR with Sandeep to simulate entrance to home  PT Treatment Day 2   Plan   Treatment/Interventions Functional transfer training;LE strengthening/ROM; Elevations; Therapeutic exercise;Patient/family training;Equipment eval/education; Bed mobility;Gait training; Compensatory technique education   Progress Slow progress, medical status limitations   PT Frequency 3-5x/wk   Recommendation   PT Discharge Recommendation Post acute rehabilitation services   AM-PAC Basic Mobility Inpatient   Turning in Flat Bed Without Bedrails 3   Lying on Back to Sitting on Edge of Flat Bed Without Bedrails 2   Moving Bed to Chair 2   Standing Up From Chair Using Arms 2   Walk in Room 1   Climb 3-5 Stairs With Railing 1   Basic Mobility Inpatient Raw Score 11   Basic Mobility Standardized Score 30 25   Highest Level Of Mobility   -HL Goal 4: Move to chair/commode   JH-HL Achieved 3: Sit at edge of bed   Education   Education Provided Mobility training   Patient Demonstrates acceptance/verbal understanding;Reinforcement needed   End of Consult   Patient Position at End of Consult Supine;Bed/Chair alarm activated; All needs within reach  [facility], RN present in room)         The patient's AM-PAC Basic Mobility Inpatient Short Form Raw Score is 11  A Raw score of less than or equal to 16 suggests the patient may benefit from discharge to home  Please also refer to the recommendation of the Physical Therapist for safe discharge planning  Pt will continue to benefit from skilled inpatient PT during this admission in order to facilitate progress towards goals and to maximize pt's independence w/ functional mobility      DC rec: post acute rehab      Robert Mckee, PT, DPT  05/08/23

## 2023-05-08 NOTE — ASSESSMENT & PLAN NOTE
Lab Results   Component Value Date    HGBA1C 5 9 (H) 04/20/2023       Recent Labs     05/07/23  1147 05/07/23  1756 05/08/23  0039 05/08/23  0651   POCGLU 109 130 139 126       Blood Sugar Average: Last 72 hrs:  (P) 185 [id number]- Hold home medications   - Start sliding scale insulin q6h with q6h glucose checks

## 2023-05-08 NOTE — OCCUPATIONAL THERAPY NOTE
Occupational Therapy Treatment Note     Patient Name: Julissa Baez Date: 5/8/2023  Problem List  Principal Problem:    Kidney capsule rupture, left, initial encounter  Active Problems:    Type 2 diabetes mellitus (HCC)    CAD (coronary artery disease)    Seizure disorder (HCC)    Leukemia (HCC)    Splenic hemorrhage    Paroxysmal atrial fibrillation (HCC)    Coagulopathy (HCC)    SHELIA (acute kidney injury) ([facility] [address] )    Acute respiratory insufficiency    Acute blood loss anemia    Hyperbilirubinemia       05/08/23 1051   OT Last Visit   OT Visit Date 05/08/23   Note Type   Note Type Treatment for insurance authorization   Pain Assessment   Pain Assessment Tool 0-10   Pain Score 5   Pain Location/Orientation Orientation: Left; Location: Hip   Pain Onset/Description Onset: Ongoing;Frequency: Intermittent  (pt reports d/t arthritis, onset upon sitting EOB)   Effect of Pain on Daily Activities limits functional reach during LB AD:s   Hospital Pain Intervention(s) Repositioned  (unable to ambulate 2/2 BPs)   Multiple Pain Sites No   Restrictions/Precautions   Weight Bearing Precautions Per Order No   Other Precautions Cognitive; Chair Alarm; Bed Alarm; Fall Risk;Pain;O2  (3L o2 via NC, not worn at baseline )   Lifestyle   Autonomy PTA, pt (I) with all ADLs and IADLs   Reciprocal Relationships spouse, Don Elvermer   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 to assess  (Anticipate Mod A based on functional performance, however Unable to further assess LB ADLs 2/2 elevated BPs; OOB assessment deferred)   LB Dressing Comments total A to don socks 2/2 pain/difficulty pending forward for LB ADLs, reports pain in hip and abdomin  Toileting Assistance  Unable to assess   Bed Mobility   Supine to Sit 3  Moderate assistance   Additional items Assist x 1; Increased time "required;Verbal cues;LE management;HOB elevated; Bedrails   Sit to Supine 3  Moderate assistance   Additional items Assist x 1; Increased time required;Verbal cues;LE management   Additional Comments Pt sat EOB x8 minutes without LOB, overall supervision sitting balance  Reports non progressing lightheaded/dizziness seated EOB, BPs 190/109, checked 3x on BUE  RN made aware and at bedside at end of session  Further treatment deferred 2/2 HTN   Transfers   Sit to Stand Unable to assess   Stand to Sit Unable to assess   Additional Comments OOB mobility deferred due to hypertension w/ pt c/o lightheadedness/dizziness   Subjective   Subjective \" I feel good\"   Cognition   Overall Cognitive Status WFL  (suspect higher level deficits, will continue to assess)   Arousal/Participation Alert; Cooperative   Attention Attends with cues to redirect   Orientation Level Oriented X4   Memory Within functional limits   Following Commands Follows one step commands without difficulty   Comments Pt agreeable to therapy and eager for OOB mobility  Activity Tolerance   Activity Tolerance Treatment limited secondary to medical complications (Comment)  (HTN)   Medical Staff Made Aware PT, RN , CM   Assessment   Assessment Patient seen for OT treatment on 5/8/2023 s/p admission for Kidney capsule rupture, left, initial encounter Patient presents with active orders for OT eval and treat  Upon arrival, pt found resting in bed showing no signs of distress  Patient agreeable to OT session and eager for OOB mobility  Patient participated in bed mobility  , but unable to participate in OOB mobility , LB dressing 2/2 elevated BP readings (190/91), RN made aware  Drake Layton II  is continuing to perform below baseline due to the following deficits: endurance , decreased activity tolerance  and (+) pain ; further limited by medical status   From OT standpoint, patient would benefit from skilled intervention to maximize independence with ADLs and " functional mobility  Plan to f/u with pt following improved BP  Goals remain appropriate, continue POC  At this time, recommending D/C to: post-acute rehabilitation   Plan   Treatment Interventions ADL retraining;Functional transfer training;UE strengthening/ROM; Endurance training;Patient/family training;Equipment evaluation/education; Fine motor coordination activities; Compensatory technique education; Energy conservation   Goal Expiration Date 05/14/23   OT Treatment Day 1   OT Frequency 3-5x/wk   Recommendation   OT Discharge Recommendation Post acute rehabilitation services   Additional Comments  The patient's raw score on the AM-PAC Daily Activity Inpatient Short Form is 17  A raw score of less than 19 suggests the patient may benefit from discharge to post-acute rehabilitation services  Please refer to the recommendation of the Occupational Therapist for safe discharge planning     AM-PAC Daily Activity Inpatient   Lower Body Dressing 2   Bathing 2   Toileting 2   Upper Body Dressing 3   Grooming 4   Eating 4   Daily Activity Raw Score 17   Daily Activity Standardized Score (Calc for Raw Score >=11) 37 26   AM-[facility] Applied Cognition Inpatient   Following a Speech/Presentation 4   Understanding Ordinary Conversation 4   Taking Medications 4   Remembering Where Things Are Placed or Put Away 3   Remembering List of 4-5 Errands 3   Taking Care of Complicated Tasks 4   Applied Cognition Raw Score 22   Applied Cognition Standardized Score 47 83   Dixon Rosales

## 2023-05-08 NOTE — ASSESSMENT & PLAN NOTE
-noted 5/8  -no history of hepatitis, cirrhosis, alcohol use, or LFT abnormalities  -denies RUQ abdominal pain  -direct T bili 8 94, D bili 6 05  -, , ALT 93  -RUQ US pending  -will obtain GGT, ammonia, lipase  -repeat CMP in am

## 2023-05-08 NOTE — PLAN OF CARE
Problem: PHYSICAL THERAPY ADULT  Goal: Performs mobility at highest level of function for planned discharge setting  See evaluation for individualized goals  Description: Treatment/Interventions: Functional transfer training, LE strengthening/ROM, Therapeutic exercise, Cognitive reorientation, Patient/family training, Equipment eval/education, Bed mobility, Gait training, Spoke to nursing, Spoke to case management  Equipment Recommended: Julien Dewitt       See flowsheet documentation for full assessment, interventions and recommendations  Outcome: Not Progressing  Note: Prognosis: Fair  Problem List: Decreased strength, Decreased endurance, Impaired balance, Decreased mobility, Pain  Assessment: Pt seen for PT treatment session today w/ pt agreeable to participate  Pt able to performed supine<>sit mobility w/ mod Ax1, an improvement from previous sessions  However, OOB mobility deferred at this time due to pt's BP readings >190/100 w/ pt c/o lightheadedness/dizziness while sitting at EOB  RN notified and arrived to room  Pt continues to be functioning below baseline level, and remains limited in functional mobility due to medical complications, impaired balance, decreased overall activity tolerance  Pt will continue to benefit from skilled PT intervention to promote pt's independence w/ functional mobility and progress towards set goals  Continue to recommend DC post acute rehabilitation services when medically cleared  Barriers to Discharge: Inaccessible home environment  Barriers to Discharge Comments: 7+7 JESICA  PT Discharge Recommendation: Post acute rehabilitation services    See flowsheet documentation for full assessment

## 2023-05-09 ENCOUNTER — APPOINTMENT (INPATIENT)
Dept: MRI IMAGING | Facility: HOSPITAL | Age: 73
End: 2023-05-09

## 2023-05-09 PROBLEM — N17.9 AKI (ACUTE KIDNEY INJURY) (HCC): Status: RESOLVED | Noted: 2023-05-04 | Resolved: 2023-05-09

## 2023-05-09 LAB
ALBUMIN SERPL BCP-MCNC: 3.1 G/DL (ref 3.5–5)
ALP SERPL-CCNC: 266 U/L (ref 34–104)
ALT SERPL W P-5'-P-CCNC: 74 U/L (ref 7–52)
ANION GAP SERPL CALCULATED.3IONS-SCNC: 7 MMOL/L (ref 4–13)
ANION GAP SERPL CALCULATED.3IONS-SCNC: 8 MMOL/L (ref 4–13)
AST SERPL W P-5'-P-CCNC: 66 U/L (ref 13–39)
BASOPHILS # BLD MANUAL: 0 THOUSAND/UL (ref 0–0.1)
BASOPHILS NFR MAR MANUAL: 0 % (ref 0–1)
BILIRUB DIRECT SERPL-MCNC: 4.66 MG/DL (ref 0–0.2)
BILIRUB SERPL-MCNC: 7.76 MG/DL (ref 0.2–1)
BUN SERPL-MCNC: 25 MG/DL (ref 5–25)
BUN SERPL-MCNC: 29 MG/DL (ref 5–25)
CALCIUM ALBUM COR SERPL-MCNC: 8.6 MG/DL (ref 8.3–10.1)
CALCIUM SERPL-MCNC: 7.9 MG/DL (ref 8.4–10.2)
CALCIUM SERPL-MCNC: 8.4 MG/DL (ref 8.4–10.2)
CHLORIDE SERPL-SCNC: 102 MMOL/L (ref 96–108)
CHLORIDE SERPL-SCNC: 99 MMOL/L (ref 96–108)
CO2 SERPL-SCNC: 29 MMOL/L (ref 21–32)
CO2 SERPL-SCNC: 29 MMOL/L (ref 21–32)
CREAT SERPL-MCNC: 1.2 MG/DL (ref 0.6–1.3)
CREAT SERPL-MCNC: 1.21 MG/DL (ref 0.6–1.3)
EOSINOPHIL # BLD MANUAL: 0.14 THOUSAND/UL (ref 0–0.4)
EOSINOPHIL NFR BLD MANUAL: 1 % (ref 0–6)
ERYTHROCYTE [DISTWIDTH] IN BLOOD BY AUTOMATED COUNT: 14.6 % (ref 11.6–15.1)
GFR SERPL CREATININE-BSD FRML MDRD: 59 ML/MIN/1.73SQ M
GFR SERPL CREATININE-BSD FRML MDRD: 60 ML/MIN/1.73SQ M
GLUCOSE SERPL-MCNC: 122 MG/DL (ref 65–140)
GLUCOSE SERPL-MCNC: 122 MG/DL (ref 65–140)
GLUCOSE SERPL-MCNC: 128 MG/DL (ref 65–140)
GLUCOSE SERPL-MCNC: 154 MG/DL (ref 65–140)
GLUCOSE SERPL-MCNC: 154 MG/DL (ref 65–140)
GLUCOSE SERPL-MCNC: 167 MG/DL (ref 65–140)
HAV IGM SER QL: NORMAL
HBV CORE IGM SER QL: NORMAL
HBV SURFACE AG SER QL: NORMAL
HCT VFR BLD AUTO: 27 % (ref 36.5–49.3)
HCV AB SER QL: NORMAL
HGB BLD-MCNC: 8.6 G/DL (ref 12–17)
HYPERCHROMIA BLD QL SMEAR: PRESENT
INR PPP: 1.21 (ref 0.84–1.19)
LYMPHOCYTES # BLD AUTO: 1.15 THOUSAND/UL (ref 0.6–4.47)
LYMPHOCYTES # BLD AUTO: 8 % (ref 14–44)
MAGNESIUM SERPL-MCNC: 2 MG/DL (ref 1.9–2.7)
MAGNESIUM SERPL-MCNC: 2.2 MG/DL (ref 1.9–2.7)
MCH RBC QN AUTO: 31.2 PG (ref 26.8–34.3)
MCHC RBC AUTO-ENTMCNC: 31.9 G/DL (ref 31.4–37.4)
MCV RBC AUTO: 98 FL (ref 82–98)
METAMYELOCYTES NFR BLD MANUAL: 2 % (ref 0–1)
MONOCYTES # BLD AUTO: 0.43 THOUSAND/UL (ref 0–1.22)
MONOCYTES NFR BLD: 3 % (ref 4–12)
MYELOCYTES NFR BLD MANUAL: 1 % (ref 0–1)
NEUTROPHILS # BLD MANUAL: 12.2 THOUSAND/UL (ref 1.85–7.62)
NEUTS BAND NFR BLD MANUAL: 7 % (ref 0–8)
NEUTS SEG NFR BLD AUTO: 78 % (ref 43–75)
PHOSPHATE SERPL-MCNC: 1.8 MG/DL (ref 2.3–4.1)
PLATELET # BLD AUTO: 157 THOUSANDS/UL (ref 149–390)
PLATELET BLD QL SMEAR: ADEQUATE
PMV BLD AUTO: 10.6 FL (ref 8.9–12.7)
POLYCHROMASIA BLD QL SMEAR: PRESENT
POTASSIUM SERPL-SCNC: 3.9 MMOL/L (ref 3.5–5.3)
POTASSIUM SERPL-SCNC: 4.3 MMOL/L (ref 3.5–5.3)
PROT SERPL-MCNC: 5.3 G/DL (ref 6.4–8.4)
PROTHROMBIN TIME: 15.6 SECONDS (ref 11.6–14.5)
RBC # BLD AUTO: 2.76 MILLION/UL (ref 3.88–5.62)
RBC MORPH BLD: PRESENT
SODIUM SERPL-SCNC: 136 MMOL/L (ref 135–147)
SODIUM SERPL-SCNC: 138 MMOL/L (ref 135–147)
WBC # BLD AUTO: 14.35 THOUSAND/UL (ref 4.31–10.16)

## 2023-05-09 RX ORDER — FUROSEMIDE 10 MG/ML
40 INJECTION INTRAMUSCULAR; INTRAVENOUS ONCE
Status: COMPLETED | OUTPATIENT
Start: 2023-05-09 | End: 2023-05-09

## 2023-05-09 RX ORDER — ALBUTEROL SULFATE 2.5 MG/3ML
2.5 SOLUTION RESPIRATORY (INHALATION) EVERY 6 HOURS PRN
Status: DISCONTINUED | OUTPATIENT
Start: 2023-05-09 | End: 2023-05-15 | Stop reason: HOSPADM

## 2023-05-09 RX ADMIN — PANTOPRAZOLE SODIUM 40 MG: 40 TABLET, DELAYED RELEASE ORAL at 09:19

## 2023-05-09 RX ADMIN — INSULIN LISPRO 1 UNITS: 100 INJECTION, SOLUTION INTRAVENOUS; SUBCUTANEOUS at 19:41

## 2023-05-09 RX ADMIN — FUROSEMIDE 40 MG: 10 INJECTION, SOLUTION INTRAMUSCULAR; INTRAVENOUS at 12:35

## 2023-05-09 RX ADMIN — METHOCARBAMOL TABLETS 500 MG: 500 TABLET, COATED ORAL at 05:08

## 2023-05-09 RX ADMIN — LEVALBUTEROL HYDROCHLORIDE 1.25 MG: 1.25 SOLUTION RESPIRATORY (INHALATION) at 20:57

## 2023-05-09 RX ADMIN — METHOCARBAMOL TABLETS 500 MG: 500 TABLET, COATED ORAL at 14:28

## 2023-05-09 RX ADMIN — LACOSAMIDE 100 MG: 100 TABLET, FILM COATED ORAL at 09:19

## 2023-05-09 RX ADMIN — HEPARIN SODIUM 5000 UNITS: 5000 INJECTION INTRAVENOUS; SUBCUTANEOUS at 14:28

## 2023-05-09 RX ADMIN — PRAVASTATIN SODIUM 80 MG: 80 TABLET ORAL at 17:37

## 2023-05-09 RX ADMIN — HEPARIN SODIUM 5000 UNITS: 5000 INJECTION INTRAVENOUS; SUBCUTANEOUS at 05:08

## 2023-05-09 RX ADMIN — DOCUSATE SODIUM 100 MG: 100 CAPSULE, LIQUID FILLED ORAL at 17:37

## 2023-05-09 RX ADMIN — Medication 2.5 MG: at 12:46

## 2023-05-09 RX ADMIN — Medication 2.5 MG: at 18:00

## 2023-05-09 RX ADMIN — LEVALBUTEROL HYDROCHLORIDE 1.25 MG: 1.25 SOLUTION RESPIRATORY (INHALATION) at 13:03

## 2023-05-09 RX ADMIN — HEPARIN SODIUM 5000 UNITS: 5000 INJECTION INTRAVENOUS; SUBCUTANEOUS at 21:14

## 2023-05-09 RX ADMIN — INSULIN LISPRO 1 UNITS: 100 INJECTION, SOLUTION INTRAVENOUS; SUBCUTANEOUS at 12:36

## 2023-05-09 RX ADMIN — PANTOPRAZOLE SODIUM 40 MG: 40 TABLET, DELAYED RELEASE ORAL at 21:13

## 2023-05-09 RX ADMIN — DOCUSATE SODIUM 100 MG: 100 CAPSULE, LIQUID FILLED ORAL at 09:19

## 2023-05-09 RX ADMIN — SENNOSIDES 8.6 MG: 8.6 TABLET, FILM COATED ORAL at 21:13

## 2023-05-09 RX ADMIN — METOPROLOL TARTRATE 25 MG: 25 TABLET, FILM COATED ORAL at 09:19

## 2023-05-09 RX ADMIN — LEVALBUTEROL HYDROCHLORIDE 1.25 MG: 1.25 SOLUTION RESPIRATORY (INHALATION) at 07:12

## 2023-05-09 RX ADMIN — LIDOCAINE 5% 1 PATCH: 700 PATCH TOPICAL at 09:20

## 2023-05-09 RX ADMIN — METHOCARBAMOL TABLETS 500 MG: 500 TABLET, COATED ORAL at 21:13

## 2023-05-09 RX ADMIN — METOPROLOL TARTRATE 25 MG: 25 TABLET, FILM COATED ORAL at 21:13

## 2023-05-09 RX ADMIN — LACOSAMIDE 100 MG: 100 TABLET, FILM COATED ORAL at 21:13

## 2023-05-09 RX ADMIN — EZETIMIBE 10 MG: 10 TABLET ORAL at 17:37

## 2023-05-09 NOTE — ASSESSMENT & PLAN NOTE
-requiring 3L NC oxygen to maintain SpO2>92%  -Lung sounds with bilateral upper wheezing, not resolved with racemic epinephrine or lasix  -CT chest with moderate left pleural effusion, small right pleural effusion  -continue scheduled Xopenex neb treatments  -wean oxygen as tolerated

## 2023-05-09 NOTE — ASSESSMENT & PLAN NOTE
Lab Results   Component Value Date    HGB 8 6 (L) 05/09/2023    HGB 8 3 (L) 05/08/2023    HGB 7 9 (L) 05/07/2023    HGB 8 0 (L) 05/06/2023       · 2/2 kidney capsule rupture in the setting of coagulopathy on home Xarelto and ASA in route to ER  · Reversed with KCentra and DDAVP  · Total Blood products to date  · PRBCs 4 units  · FFP 4 units  · Transfuse for HGB < 7 or hemodynamic instability  · Required levophed for short period of time in IR -- weaned off prior to completion of procedure  · Maintain MAP > 65 mmHg  · Follow H&H

## 2023-05-09 NOTE — PROGRESS NOTES
[facility]  Progress Note  Name: Ksenia Rogers  MRN: [id number]  Unit/Bed#: S -01 I Date of Admission: 5/3/2023   Date of Service: 5/9/2023 I Hospital Day: 6    Assessment/Plan   * Kidney capsule rupture, left, initial encounter  Assessment & Plan  · Non-traumatic  · Patient was reportedly in his usual state of health when he awoke with sudden onset left abdominal/flank pain  Review of notes from [facility] -- he does have history of renal cysts/stones in the past  He is on Xarelto, and was given ASA by his wife prior to arrival to hospital   · CT A/P:  Large subcapsular hematoma compressing the left kidney measuring 13 5 x 15 8 x 9 7 cm and up to 6 2 cm in thickness  Linear hyperdensities within the posterior aspect of the hematoma compatible with active extravasation  There is surrounding perinephric and retroperitoneal hemorrhage  · Received Kcentra and DDAVP for reversal  · Resuscitated with blood products as below  · IR consulted for embolization / bleeding control  · Lower pole initially embolized and patient redeveloped shock  Returned to IR for repeat embolization of lower pole  · Now remains with stable hemodynamics and h/h    Plan:   · Continue 3 way potter, will need CBI if bloody UO noted  · If re-bleeds will likely need emergent nephrectomy for bleeding control  · Serial abdominal exams  · Trend hgb as below  · Urology consulted - input appreciated   · Urology recommends outpatient MRI of left kidney, f/u in 6 weeks with urology  · Monitor UO and renal indices closely      Acute respiratory insufficiency  Assessment & Plan  -requiring 3L NC oxygen to maintain SpO2>92%  -Lung sounds with bilateral upper wheezing, not resolved with racemic epinephrine or lasix  -CT chest with moderate left pleural effusion, small right pleural effusion  -continue scheduled Xopenex neb treatments  -wean oxygen as tolerated    Hyperbilirubinemia  Assessment & Plan  -noted 5/8  -no history of hepatitis, cirrhosis, alcohol use, or LFT abnormalities  -denies RUQ abdominal pain  -direct T bili 8 94->7 7, D bili 6 05-> 4 66  -->266, ->66, ALT 93->74  -RUQ US with cholelithiasis, no evidence of acute cholecystitis  -GGT elevated, 171  -lipase, ammonia WNL  -GI following, notes likely ischemic hepatitis, however does recommend MRCP  -repeat CMP in am      Acute blood loss anemia  Assessment & Plan  Lab Results   Component Value Date    HGB 8 6 (L) 05/09/2023    HGB 8 3 (L) 05/08/2023    HGB 7 9 (L) 05/07/2023    HGB 8 0 (L) 05/06/2023       · 2/2 kidney capsule rupture in the setting of coagulopathy on home Xarelto and ASA in route to ER  · Reversed with Garold Jumbo and DDAVP  · Total Blood products to date  · PRBCs 4 units  · FFP 4 units  · Transfuse for HGB < 7 or hemodynamic instability  · Required levophed for short period of time in IR -- weaned off prior to completion of procedure  · Maintain MAP > 65 mmHg  · Follow H&H    Coagulopathy (HCC)  Assessment & Plan  · INR 4 33 on arrival -- likely falsely elevated 2/2 Xarelto  Also received ASA by EMS prior to arrival   · Reversed on admission with Garold Jumbo and DDAVP  · Holding AC/AP due to hemorrhagic shock  · INR most recently 1 21  · Hgb stable    Lab Results   Component Value Date    INR 1 21 (H) 05/09/2023    HGB 8 6 (L) 05/09/2023    HGB 8 3 (L) 05/08/2023    HGB 7 9 (L) 05/07/2023    HGB 8 0 (L) 05/06/2023         Paroxysmal atrial fibrillation (HCC)  Assessment & Plan  · Home regimen: Xarelto and metoprolol  · Holding Xarelto for now, in the setting of hemorrhagic shock/kidney capsule rupture  · Continue telemetry  · Patient is now hemodynamically stable s/p volume resuscitation with blood products and IR procedure for embolization  Was on vasopressors for short period of time during volume resuscitation  No longer on vasopressors    · Continue metoprolol  · Holding AC/AP 2/2 ABLA with shock on admission in the setting of renal capsule "rupture    Leukemia ([facility] [address] )  Assessment & Plan  · S/p chemotherapy -- currently in remission for 14 years  Seizure disorder [facility])  Assessment & Plan  · Last seizure: July 2022 (was off medication at time of seizure due to cost)  · Currently well controlled with Vimpat  · Continue home Vimpat  CAD (coronary artery disease)  Assessment & Plan  · Hx CABG 2020  · Hold AC/AP for now  · Hold Xarelto  · Continue metoprolol   · Continue statin  · Continue prn labetalol for HTN      Type 2 diabetes mellitus [facility])  Assessment & Plan  Lab Results   Component Value Date    HGBA1C 5 9 (H) 04/20/2023       Recent Labs     05/08/23  1852 05/08/23  2347 05/09/23  0603 05/09/23  0737   POCGLU 121 137 128 122       Blood Sugar Average: Last 72 hrs:  - Hold home medications   - Start sliding scale insulin q6h with q6h glucose checks    SHELIA (acute kidney injury) (HCC)-resolved as of 5/9/2023  Assessment & Plan  Lab Results   Component Value Date    CREATININE 1 20 05/09/2023    CREATININE 1 41 (H) 05/08/2023    CREATININE 1 76 (H) 05/07/2023     · 2/2 hemorrhagic shock, contrast exposure, left kidney capsule rupture  · Baseline creatinine 0 9, on admission 1 18 - improving  · 3-way potter placed in ER in anticipation of possible hematuria - none noted today 5/7/23    Plan:  · IVF discontinued  · Maintain 3 way potter  · Monitor UO and renal indices  · Avoid hypotension, nephrotoxins  · Monitor fluid status   · Consider discontinuing potter catheter           Bowel Regimen: senna, colace, last BM 2 days ago  VTE Prophylaxis:Heparin     Disposition: pending clinical improvement, MRCP, PT/OT reccs    Subjective   Chief Complaint: \"I want this catheter out\"  Subjective:   Pt examined at bedside, is alert, pleasant, states that he feels much better than yesterday  Denies shortness of breath, feels that the nebulizer treatments are helping  States that his abdomen no longer feels uncomfortable    Is requesting catheter to be removed, " would like to be able to get up to ambulate  No acute events overnight per nursing  Objective   Vitals:   Temp:  [98 °F (36 7 °C)-99 4 °F (37 4 °C)] 99 1 °F (37 3 °C)  HR:  [] 94  Resp:  [18-19] 18  BP: (133-195)/() 133/89    I/O       05/07 0701 05/08 0700 05/08 0701  05/09 0700 05/09 0701  05/10 0700    P  O  240 480     Total Intake(mL/kg) 240 (2 2) 480 (4 4)     Urine (mL/kg/hr) 900 (0 3) 1500 (0 6)     Total Output 900 1500     Net -660 -1020                   Physical Exam:   GENERAL APPEARANCE: Alert, pleasant, in no apparent distress  NEURO: Oriented x4  HEENT: Scleral icterus  PERRL, NCAT, moist oral mucosa  CV: RRR, S1S2 without murmurs, gallops, or rubs  2+ radial and DP pulses bilaterally  No edema of the bilateral lower extremities  LUNGS: Mild tachypnea without accessory muscle use  Mild end expiratory wheezes in the upper lauren fields  No rales or rhonchi  GI: Abd soft, nontender, nondistended  : Huggins catheter in place, no gross hematuria  MSK: Moving all extremities spontaneously  SKIN: Jaundice over the face, trunk, and b/l UE  Warm, dry, intact, no rashes or petechiae  Invasive Devices     Peripheral Intravenous Line  Duration           Peripheral IV 05/08/23 Distal;Dorsal (posterior); Right Forearm <1 day          Drain  Duration           External Urinary Catheter <1 day                      Lab Results:   BMP/CMP:   Lab Results   Component Value Date    SODIUM 138 05/09/2023    K 4 3 05/09/2023     05/09/2023    CO2 29 05/09/2023    BUN 29 (H) 05/09/2023    CREATININE 1 20 05/09/2023    CALCIUM 7 9 (L) 05/09/2023    AST 66 (H) 05/09/2023    ALT 74 (H) 05/09/2023    ALKPHOS 266 (H) 05/09/2023    EGFR 60 05/09/2023    and CBC:   Lab Results   Component Value Date    WBC 14 35 (H) 05/09/2023    HGB 8 6 (L) 05/09/2023    HCT 27 0 (L) 05/09/2023    MCV 98 05/09/2023     05/09/2023    MCH 31 2 05/09/2023    MCHC 31 9 05/09/2023    RDW 14 6 05/09/2023 MPV 10 6 05/09/2023     Imaging: I have personally reviewed pertinent reports  and I have personally reviewed pertinent films in PACS   Other Studies: Pending MRCP

## 2023-05-09 NOTE — ASSESSMENT & PLAN NOTE
· Non-traumatic  · Patient was reportedly in his usual state of health when he awoke with sudden onset left abdominal/flank pain  Review of notes from [facility] -- he does have history of renal cysts/stones in the past  He is on Xarelto, and was given ASA by his wife prior to arrival to hospital   · CT A/P:  Large subcapsular hematoma compressing the left kidney measuring 13 5 x 15 8 x 9 7 cm and up to 6 2 cm in thickness  Linear hyperdensities within the posterior aspect of the hematoma compatible with active extravasation  There is surrounding perinephric and retroperitoneal hemorrhage  · Received Kcentra and DDAVP for reversal  · Resuscitated with blood products as below  · IR consulted for embolization / bleeding control  · Lower pole initially embolized and patient redeveloped shock  Returned to IR for repeat embolization of lower pole  · Now remains with stable hemodynamics and h/h    Plan:   · Continue 3 way potter, will need CBI if bloody UO noted  · If re-bleeds will likely need emergent nephrectomy for bleeding control  · Serial abdominal exams  · Trend hgb as below  · Urology consulted - input appreciated   · Urology recommends outpatient MRI of left kidney, f/u in 6 weeks with urology  · Monitor UO and renal indices closely

## 2023-05-09 NOTE — ASSESSMENT & PLAN NOTE
-noted 5/8  -no history of hepatitis, cirrhosis, alcohol use, or LFT abnormalities  -denies RUQ abdominal pain  -direct T bili 8 94->7 7, D bili 6 05-> 4 66  -->266, ->66, ALT 93->74  -RUQ US with cholelithiasis, no evidence of acute cholecystitis  -GGT elevated, 171  -lipase, ammonia WNL  -GI following, notes likely ischemic hepatitis, however does recommend MRCP  -repeat CMP in am

## 2023-05-09 NOTE — QUICK NOTE
Patient's wife requested a medical update at bedside and I discussed with her the current treatment plan including MRCP and further evaluation of lab work in the morning  She was appreciative of the update and all of her questions and concerns were answered to her satisfaction

## 2023-05-09 NOTE — NURSING NOTE
Patient continuously monitored by Radiology RN for MRI of the abdomen  Patient tolerated well  Vital signs stable throughout  No complaints per patient

## 2023-05-09 NOTE — CONSULTS
Consultation - [address] [facility]  Ioana Hayden II 72 y o  male MRN: [id number]  Unit/Bed#: S -01 Encounter: [id number]        Inpatient consult to gastroenterology  Consult performed by: Shelby Kent PA-C  Consult ordered by: Jackelyn Roth DO          Reason for Consult / Principal Problem: elevated LFTs    ASSESSMENT and PLAN:    Principal Problem:    Kidney capsule rupture, left, initial encounter  Active Problems:    Type 2 diabetes mellitus ([facility] )    CAD (coronary artery disease)    Seizure disorder ([facility] )    Leukemia ([facility] )    Splenic hemorrhage    Paroxysmal atrial fibrillation ([facility] )    Coagulopathy ([facility] )    SHELIA (acute kidney injury) ([facility] )    Acute respiratory insufficiency    Acute blood loss anemia    Hyperbilirubinemia    #1  Elevated LFTs: Primarily hyperbilirubinemia with mildly elevated AST and ALT  AST 66, ALT 74, alk phos 266, total bilirubin 7 7, all of these have improved since yesterday  Last LFT draw was done on 5/3 prior to this and LFTs were normal at that time  These labs were not checked for the last 5 days  Patient did come in with significant hypotension with hemorrhagic shock and suspect ischemic hepatitis as the source with a lagging bilirubin  Also consider drug-induced liver injury with a couple doses of cefepime  Patient denies any abdominal pain  Doubt for any biliary obstruction with normal ultrasound  -Can proceed with MRCP just to fully rule out biliary obstruction  Ultrasound not show any biliary ductal dilation but he does have gallstones on imaging and his gallbladder  -Continue to monitor LFTs closely  -no signs of liver failure  -Avoid hepatotoxic medications and hypotension    #2   Colon cancer screening  -recommend outpatient eval for colonoscopy after recovered   -------------------------------------------------------------------------------------------------------------------    HPI: This is a 46-year-old male with a history of coronary artery disease, diabetes, leukemia, and seizures who presented to the hospital several days ago secondary to hemorrhagic shock from a large subcapsular hematoma on the left kidney  Patient was hypotensive at that time  He had normal LFTs upon admission however on his most recent lab draw yesterday he was noted to have a significant increase in his LFTs  Unfortunately these had not been checked for this 5 days prior to this so it is unclear when this actually started  He was noted to have scleral icterus and jaundice starting within the last couple days  He denies any abdominal pain  He does have an ultrasound that was completed yesterday which showed a normal CBD without any biliary ductal dilation however he does have multiple gallstones within the gallbladder and some mild right upper quadrant abdominal ascites  Patient denies ever having any issues with his liver in the past   Denies any family history of liver disease  Denies any significant alcohol use  Patient reports having a bowel movement today and denies any blood in the stool or black tarry stools  He has never had a colonoscopy    REVIEW OF SYSTEMS:    CONSTITUTIONAL: Denies any fever, chills, or rigors  Decreased appetite, and no recent weight loss  HEENT: No earache or tinnitus  Denies hearing loss or visual disturbances  CARDIOVASCULAR: No chest pain or palpitations  RESPIRATORY: Denies any cough, hemoptysis, shortness of breath or dyspnea on exertion  GASTROINTESTINAL: As noted in the History of Present Illness  GENITOURINARY: No problems with urination  Denies any hematuria or dysuria  Dark urine  NEUROLOGIC: No dizziness or vertigo, denies headaches  MUSCULOSKELETAL: Denies any muscle or joint pain  SKIN: Denies skin rashes or itching  ENDOCRINE: Denies excessive thirst  Denies intolerance to heat or cold  PSYCHOSOCIAL: Denies depression or anxiety  Denies any recent memory loss         Historical Information   Past Medical History:   Diagnosis Date   • CAD (coronary artery disease)    • DM (diabetes mellitus) ([facility] )    • Leukemia ([facility] )    • Seizure disorder ([facility] )      Past Surgical History:   Procedure Laterality Date   • CORONARY ARTERY BYPASS GRAFT     • IR EMBOLIZATION (SPECIFY VESSEL OR SITE)  5/3/2023   • IR EMBOLIZATION (SPECIFY VESSEL OR SITE)  5/3/2023     Social History   Social History     Substance and Sexual Activity   Alcohol Use None     Social History     Substance and Sexual Activity   Drug Use Not on file     Social History     Tobacco Use   Smoking Status Not on file   Smokeless Tobacco Not on file     No family history on file      Meds/Allergies     Medications Prior to Admission   Medication   • ezetimibe-simvastatin (VYTORIN) 10-40 mg per tablet   • glimepiride (AMARYL) 2 mg tablet   • lacosamide (VIMPAT) 100 mg tablet   • metFORMIN (GLUCOPHAGE-XR) 500 mg 24 hr tablet   • metoprolol tartrate (LOPRESSOR) 25 mg tablet   • rivaroxaban (XARELTO) 10 mg tablet     Current Facility-Administered Medications   Medication Dose Route Frequency   • acetaminophen (TYLENOL) tablet 650 mg  650 mg Oral Q6H PRN   • albuterol inhalation solution 2 5 mg  2 5 mg Nebulization Q6H PRN   • chlorhexidine (PERIDEX) 0 12 % oral rinse 15 mL  15 mL Mouth/Throat Q12H [address]   • docusate sodium (COLACE) capsule 100 mg  100 mg Oral BID   • ezetimibe (ZETIA) tablet 10 mg  10 mg Oral Daily With Dinner    And   • pravastatin (PRAVACHOL) tablet 80 mg  80 mg Oral Daily With Dinner   • heparin (porcine) subcutaneous injection 5,000 Units  5,000 Units Subcutaneous Q8H [address]   • HYDROmorphone HCl (DILAUDID) injection 0 2 mg  0 2 mg Intravenous Q4H PRN   • insulin lispro (HumaLOG) 100 units/mL subcutaneous injection 1-6 Units  1-6 Units Subcutaneous Q6H [address]   • labetalol (NORMODYNE) injection 20 mg  20 mg Intravenous Q6H PRN   • lacosamide (VIMPAT) tablet 100 mg  100 mg Oral Q12H [address]   • levalbuterol (XOPENEX) inhalation solution 1 25 mg  1 25 mg Nebulization "TID   • lidocaine (LIDODERM) 5 % patch 1 patch  1 patch Topical Daily   • methocarbamol (ROBAXIN) tablet 500 mg  500 mg Oral Q8H [facility]   • metoclopramide (REGLAN) injection 10 mg  10 mg Intravenous Q6H PRN   • metoprolol tartrate (LOPRESSOR) tablet 25 mg  25 mg Oral Q12H [facility]   • ondansetron (ZOFRAN) injection 4 mg  4 mg Intravenous Q6H PRN   • oxyCODONE (ROXICODONE) split tablet 2 5 mg  2 5 mg Oral Q4H PRN    Or   • oxyCODONE (ROXICODONE) IR tablet 5 mg  5 mg Oral Q4H PRN   • pantoprazole (PROTONIX) EC tablet 40 mg  40 mg Oral Q12H JAD   • senna (SENOKOT) tablet 8 6 mg  1 tablet Oral HS       Allergies   Allergen Reactions   • Sucralose - Food Allergy Headache   • Phenytoin Rash     Red  Jason up arm to head             Objective     Blood pressure 166/100, pulse 100, temperature 98 5 °F (36 9 °C), resp  rate 18, height 5' 10\" (1 778 m), weight 110 kg (243 lb 9 7 oz), SpO2 98 %  Intake/Output Summary (Last 24 hours) at 5/9/2023 0948  Last data filed at 5/9/2023 0604  Gross per 24 hour   Intake 240 ml   Output 1500 ml   Net -1260 ml         PHYSICAL EXAM:      General Appearance:   Alert, cooperative, no distress, appears stated age    HEENT:   Normocephalic, atraumatic, scleral icterus present, no oropharyngeal thrush present      Neck:  Supple, symmetrical, trachea midline, no adenopathy;    thyroid: no enlargement/tenderness/nodules; no carotid  bruit or JVD    Lungs:   Clear to auscultation bilaterally; no rales, rhonchi or wheezing; respirations unlabored    Heart[de-identified]   S1 and S2 normal; regular rate and rhythm; no murmur, rub, or gallop     Abdomen:   Soft, non-tender, non-distended; normal bowel sounds; no masses, no organomegaly    Genitalia:   Deferred    Rectal:   Deferred    Extremities:  No cyanosis, clubbing or edema    Pulses:  2+ and symmetric all extremities    Skin:  Skin texture, turgor normal, no rashes or lesions; jaundice present   Lymph nodes:  No palpable cervical, axillary or inguinal " lymphadenopathy        Lab Results:   Results from last 7 days   Lab Units 05/09/23  0506 05/04/23  0113 05/03/23  1858   WBC Thousand/uL 14 35*   < > 22 63*   HEMOGLOBIN g/dL 8 6*   < > 11 0*   HEMATOCRIT % 27 0*   < > 32 5*   PLATELETS Thousands/uL 157   < > 130*   NEUTROS PCT %  --   --  85*   LYMPHS PCT %  --   --  3*   LYMPHO PCT % 8*   < >  --    MONOS PCT %  --   --  8   MONO PCT % 3*   < >  --    EOS PCT % 1   < > 3    < > = values in this interval not displayed  Results from last 7 days   Lab Units 05/09/23  0506 05/03/23  1858 05/03/23  1717   POTASSIUM mmol/L 4 3   < >  --    CHLORIDE mmol/L 102   < >  --    CO2 mmol/L 29   < >  --    CO2, I-STAT mmol/L  --   --  22   BUN mg/dL 29*   < >  --    CREATININE mg/dL 1 20   < >  --    CALCIUM mg/dL 7 9*   < >  --    ALK PHOS U/L 266*   < >  --    ALT U/L 74*   < >  --    AST U/L 66*   < >  --    GLUCOSE, ISTAT mg/dl  --   --  236*    < > = values in this interval not displayed  Results from last 7 days   Lab Units 05/09/23  0506   INR  1 21*     Results from last 7 days   Lab Units 05/08/23  0909   LIPASE u/L 27       Imaging Studies: I have personally reviewed pertinent imaging studies  XR chest portable    Result Date: 5/9/2023  Impression: No interval change  Workstation performed: [id number]     XR chest portable    Result Date: 5/9/2023  Impression: Bilateral pleural effusions, larger on the left and lower lobe compressive atelectasis versus infiltrates  Workstation performed: [id number]     XR chest portable    Result Date: 5/5/2023  Impression: Small left and trace right effusion  Workstation performed: [id number]     XR chest 1 view portable    Result Date: 5/3/2023  Impression: No free air is seen under the diaphragms   There is a small left effusion and mild bibasilar subsegmental atelectasis Workstation performed: [id number]     XR abdomen 1 view kub    Result Date: 5/3/2023  Impression: No evidence of bowel obstruction Workstation performed: [id number]     CT chest wo contrast    Result Date: 5/7/2023  Impression: Moderate left and small right effusion with compressive atelectasis of the lower lobes  No acute pulmonary disease  Partially imaged left perinephric hematoma with minimal ascites  Workstation performed: [id number]     X-ray chest 1 view    Result Date: 5/4/2023  Impression: Satisfactory position of the endotracheal tube  Workstation performed: [id number]     CTA chest (pe study) abdomen pelvis routine contrast    Result Date: 5/3/2023  Impression: Increased size of the known left perinephric hematoma status post left renal arterial embolization with a site of recurrent posterior perinephric active hemorrhage  Severe mass effect from the hematoma on the left kidney  There is also increased intra-abdominal hemorrhage  Small left and trace right pleural effusion; no acute pulmonary arterial embolism  Diffuse esophageal dilation with moderate circumferential thickening of the distal esophagus in keeping with a nonspecific esophagitis  I personally discussed this study with [facility] on 5/3/2023 4:50 PM  Workstation performed: [id number]     US right upper quadrant    Result Date: 5/8/2023  Impression: Visualized portion of CBD normal in caliber measuring 4 mm  No intrahepatic biliary ductal dilatation  Multiple gallstones within the gallbladder, however no evidence of acute cholecystitis  Right upper quadrant ascites  Pancreas not visualized due to overlying bowel gas  Workstation performed: [id number]     CT abdomen pelvis with contrast    Result Date: 5/3/2023  Impression: 1  Large subcapsular hematoma compressing the left kidney measuring approximately 13 5 x 15 8 x 9 7 cm and up to 6 2 cm in thickness  Linear hyperdensities within the posterior aspect of the hematoma compatible with active extravasation  There is surrounding perinephric and retroperitoneal hemorrhage   2   Vague hypodensities are seen within the superior aspect of the spleen measuring up to 4 cm and 1 9 cm respectively  These areas may reflect splenic infarction less likely laceration  There are foci of hyperdensity at the superior aspect of the spleen  which was present on prior exam and likely represent a hemangioma versus calcification  3   Flattening of the IVC compatible with hypovolemia  4   Cholelithiasis without evidence of cholecystitis  5   Stable 2 1 cm cystic lesion within the body of the pancreas  6   Dense opacity in the left lower lobe which may be due to atelectasis versus pneumonia  Small left pleural effusion  I personally discussed the left renal and splenic findings of this study with Demarcus Ley on 5/3/2023 3:17 AM  Workstation performed: [id number]     IR embolization (specify vessel or site)    Result Date: 5/4/2023  Impression: Impression: Active extravasation of contrast identified at the mid left kidney  Given patient's coagulopathy, the proximal and distal branches of the left renal artery was embolized using gelfoam and coils in alternating fashion  Completion left renal arteriogram showed no further perfusion of the left kidney  Completion aortogram and selective angiogram of the left L4 lumbar artery showed no evidence of active extravasation of contrast  Workstation performed: [id number]     IR embolization (specify vessel or site)    Result Date: 5/3/2023  Impression: Impression: Active bleeding from the lower pole of the left kidney, numerous small arterial branches feeding this bleeding which is likely venous in origin  Embolization of 4 feeding branches with Gelfoam and coils The kidney is compressed and distorted by the adjacent hematoma Workstation performed: [id number]           Patient was seen and examined by Dr Usman Biggs  All [place] medical decisions were made by Dr Usman Biggs  Thank you for allowing us to participate in the care of this present patient  We will follow-up with you closely

## 2023-05-09 NOTE — CONSULTS
Consultation - Geriatric Medicine   Bill Arguello II 72 y o  male MRN: [id number]  Unit/Bed#: S -01 Encounter: [id number]        Inpatient consult to Gerontology  Consult performed by: Chandra Munoz MD  Consult ordered by: Benja Mendenhall PA-C        No falls, uses hearing aid and viosual aids - a troke in the lt eye  CABG in 78    Assessment/Plan     1  Spontaneous non-traumatic left kidney capsular rupture  - Now status post 2 IR coil/gel embolization with resolution of hemorrhage   - In addition, he is status post 4 unit PRBC/4 unit FFP  Fortunately, hemoglobin has remained stable requiring no further transfusion   - Suspected etiology, renal cysts rupture  Vs coagulopathy in setting of DOAC  - Per urology, recommended repeat MRI in the next 4 to 6 weeks    2  Hemorrhagic shock- Resolved  - Status post large blood product transfusions  - Transiently required pressors following IR procedures, now off pressors  - BP of 156/90 mmHg on my assessment  Negative orthostatics  - Shock now resolved  Continue to monitor given tenuous cardiovascular situation  3   Increased work of breathing and hypoxia  - On 3 L NC O2 saturation greater than 97%  - Notable tachypnea however patient denies SOB  - Scattered expiratory wheezing on chest auscultation   - On my evaluation, supplemental oxygen was weaned down to 1 L/min with continued saturations greater than 97%  - CT chest from 5/7 showing bilateral pleural effusion L>R, with enlarged pulmonary artery  No appreciable pulmonary congestion   - Likely multifactorial in setting of pleural effusion, vs anemia   - Consider gentle diuresis  - If hypoxia worsens, consider obtaining a CTA PE study to rule out PE  D-dimer will likely be elevated in setting of recent hemorrhage with perinephric hematoma      4   Coronary artery disease s/p CABG and stents  - Notably, patient did have elevated troponin on presentation   - Per cardiology, likely nonischemic myocardial injury in setting of acute illness and acute bleed  2D echo showed LVEF of 65%, with LV concentric hypertrophy  - EKG changes with LPFB pattern which is new compared to prior obtained on admission   - He remains chest pain free however he is tachypneic    - Consider re-evaluation by cardiology      5  Acute kidney injury on CKD  - Creatinine of 1 2 today  Continues to improve  - Likely secondary to hemorrhagic shock  - Avoid nephrotoxic medications  Avoid hypotension  6   Acute blood loss anemia  - Hemoglobin of 8 6 today  Status post post 4 unit PRBC  Last transfusion of 05/03/2023  - bleeding source controlled following IR embolization x2    - Continue to monitor closely  - Consider transfusion with hemoglobin less than 7      7  Conjugated hyperbilirubinemia and transaminitis  - Conjugated hyperbilirubinemia with direct hemoglobin of 6 05  - Right upper quadrant ultrasound revealed multiple gallstones with no CBD dilation or acute cholecystitis  -Transaminitis likely secondary to hemorrhagic shock  Currently improving    - MRCP pending  8   Type 2 diabetes mellitus  - Blood glucose within in-hospital goal   - A1c of 5 9 2 weeks ago  - Home medications: Metformin and Glimeperide  - On SSI   - Avoid hyperglycemia and hypoglycemia  9   Seizure  - Patient does remain seizure-free  - Continue Vimpat    10  Paraoxysmal atrial fibrillation  - He is currently rate controlled at this time  - On Lopressor 25 mg every 12 hours  - Home medication of Xarelto still on hold  6  Parkinsonism  - Follows with [facility] neurologist team  He was recently seen on 04/18/2023    - On Sinemet at home, however unsure of the dose  - Patient advised to notify wife to bring his medication list to consider resuming the appropriate dose of his meds    12  Risk for delirium  - Risk factors include- - hard of hearing, parkinsonism, acute illness, age, seizure disorder and hospital devices    - On 3L NC O2 with durations greater than 97%  Apparently, seems to be tolerating lower oxygen supplementation - now on 1L NC O2 with sats > 97%  Recommend weaning off oxygen as tolerated  - Denies history of BPH or urinary disturbances at baseline  Consider discontinuation of Huggins catheter   - Recommend use of visual aid and hearing aid while in the hospital   - Continue delirium precautions  Case was discussed with the primary team   Thank you for this interesting consult  History of Present Illness   Physician Requesting Consult: Harman Sandifer, MD  Reason for Consult / Principal Problem: Renal hemorrhage, hyperbilirubinemia, acute respiratory insufficiency      HPI: Wilfred Aguiar is a 72y o  year old male who presented with spontaneous left renal capsular rupture with hemorrhagic shock on 5/03/2023  Past medical history significant for CAD, T2DM, seizure disorder, who presented to the ER on 5/3 following acute left-sided abdominal pain  CT imaging done on presentation did reveal spontaneous rupture of left kidney with active extravasation of contrast   He was noted to have hemorrhagic shock requiring transfusion with subsequent  He has received DDAVP and Kcentra for coagulopathy in setting of DOAC use  He did have IR angiogram and embolization with Gelfoam and coils of the feeding arterial branches and transferred to the ICU for supportive care  Second embolization was done to complete procedure  He did have ABLA requiring transfusion of 4u PRBC and 4u of FFP  He was evaluated by urology who recommended repeat MRI of the kidneys in the next 4 to 6 weeks when the hematoma resolved to exclude renal mass as a potential etiology of the bleed  He was noted to have SHELIA in setting of hemorrhagic shock which has progressively improved since his hospitalization  In addition, patient was evaluated by physical therapy/Occupational Therapy with the recommendation to be discharged to postacute rehabilitation center      Vital signs reviewed at this time shows elevated blood pressure 166/110 mmHg, afebrile, and required 3 L supplemental oxygen to keep saturations in the 90s  CBC with neutrophilic leukocytosis of 11 47, anemia with hemoglobin of 8 6  CMP with stable electrolytes, creatinine of 1 2, elevated alkaline phosphatase and transaminase and conjugated hyperbilirubinemia of 7 76  Blood cultures obtained on 5/3 showed no growth at 5 days  RUQ US with normal caliber CBD with no intrahepatic biliary ductal dilatation  Gallbladder showed multiple gallstones with evidence of acute cholecystitis  Subjectively, patient is seen and examined at bedside  There was no acute overnight events reported  He denies abdominal pain, shortness of breath, chest pain,  nausea, diarrhea, or lightheadedness or dizziness  He reports to be feeling better compared to his presentation status  He was able to ambulate from the bed to the bathroom with his walker today  He denies history of falls  Uses glasses  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  Also hard of hearing and uses a hearing aid at home  Review of Systems   Constitutional: Positive for fatigue  Negative for chills and fever  HENT: Negative for ear pain and sore throat  Eyes: Negative for pain and visual disturbance  Respiratory: Negative for cough and shortness of breath  Cardiovascular: Negative for chest pain and palpitations  Gastrointestinal: Negative for abdominal pain, diarrhea and vomiting  Genitourinary: Negative for dysuria and hematuria  Musculoskeletal: Negative for arthralgias and back pain  Skin: Negative for color change and rash  Neurological: Negative for seizures and syncope  All other systems reviewed and are negative  Memory/Cognitive screening:  Intact  Mobility: Self ambulating  Falls: No falls  Assistive Devices: Does not use assistive device at home    Now using rollator in the hospital   Chente Lyons: none  Nutrition/weight loss/grocery shopping/meal preparation: Done by him and spouse  Vision impairment: Yes - uses reading glasses  Had an ocular stroke following CABG 6 years ago  Hearing impairment: Yes  Incontinence: None  Delirium: None  Polypharmacy: None  Patients primary residence:[facility] Lives with:Spouse  iADL's:  Telephone: yes, Shopping: yes, Food: yes, Housekeeping: done by self, Laundry: done by self, Transport: done by self, Medications: difficulty recalling all his meds, Finances: done by self and spouse  ADL's:  Hygiene: All done by patient, Dressing: All done by patient, Feeding: All done by patient, Transferring: All done by patient, Toileting: All done by patient, Ambulation: self     Historical Information   Past medical history: T2DM, seizure disorder, paroxysmal atrial fibrillation, CAD status post CABG/stent  Past surgical history: CABG  Social history: Denies use of alcohol or smoking use  Family history: Good family and social support    Meds/Allergies   All current active meds have been reviewed  Allergies   Allergen Reactions   • Sucralose - Food Allergy Headache   • Phenytoin Rash     Red  Jason up arm to head         Objective   Vitals:    05/09/23 0747   BP: 166/100   Pulse:    Resp:    Temp:    SpO2:        Intake/Output Summary (Last 24 hours) at 5/9/2023 0857  Last data filed at 5/9/2023 0604  Gross per 24 hour   Intake 480 ml   Output 1500 ml   Net -1020 ml     Invasive Devices     Peripheral Intravenous Line  Duration           Peripheral IV 05/08/23 Distal;Dorsal (posterior); Right Forearm <1 day          Drain  Duration           Urethral Catheter Non-latex 18 Fr  6 days                Physical Exam  Vitals and nursing note reviewed  Constitutional:       Appearance: He is normal weight  He is diaphoretic  He is not toxic-appearing  HENT:      Head: Normocephalic and atraumatic  Nose: Nose normal       Mouth/Throat:      Mouth: Mucous membranes are dry     Eyes: General: Scleral icterus present  Cardiovascular:      Rate and Rhythm: Tachycardia present  Heart sounds: Normal heart sounds  No murmur heard  Pulmonary:      Effort: Respiratory distress present  Breath sounds: Wheezing present  Abdominal:      General: Bowel sounds are normal  There is no distension  Palpations: Abdomen is soft  Tenderness: There is no abdominal tenderness  Musculoskeletal:         General: Tenderness present  No swelling  Right lower leg: No edema  Left lower leg: No edema  Skin:     Coloration: Skin is jaundiced and pale  Findings: No bruising  Neurological:      Mental Status: He is oriented to person, place, and time  Mental status is at baseline  Lab Results:   I have personally reviewed pertinent lab and imaging results  VTE Prophylaxis: Reason for no pharmacologic prophylaxis Resolving hemorrhagic shock    Code Status: Level 1 - Full Code  Advance Directive and Living Will:      Power of :    POLST:      Family and Social Support: 2 daughters and 4 grandkids    Retired in 2014 [employer] [facility]()  No data recorded

## 2023-05-09 NOTE — ASSESSMENT & PLAN NOTE
· INR 4 33 on arrival -- likely falsely elevated 2/2 Xarelto    Also received ASA by EMS prior to arrival   · Reversed on admission with [facility] and DDAVP  · Holding AC/AP due to hemorrhagic shock  · INR most recently 1 21  · Hgb stable    Lab Results   Component Value Date    INR 1 21 (H) 05/09/2023    HGB 8 6 (L) 05/09/2023    HGB 8 3 (L) 05/08/2023    HGB 7 9 (L) 05/07/2023    HGB 8 0 (L) 05/06/2023

## 2023-05-09 NOTE — ASSESSMENT & PLAN NOTE
Lab Results   Component Value Date    CREATININE 1 20 05/09/2023    CREATININE 1 41 (H) 05/08/2023    CREATININE 1 76 (H) 05/07/2023     · 2/2 hemorrhagic shock, contrast exposure, left kidney capsule rupture  · Baseline creatinine 0 9, on admission 1 18 - improving  · 3-way potter placed in ER in anticipation of possible hematuria - none noted today 5/7/23    Plan:  · IVF discontinued  · Maintain 3 way potter  · Monitor UO and renal indices  · Avoid hypotension, nephrotoxins  · Monitor fluid status   · Consider discontinuing potter catheter

## 2023-05-09 NOTE — ASSESSMENT & PLAN NOTE
Lab Results   Component Value Date    HGBA1C 5 9 (H) 04/20/2023       Recent Labs     05/08/23  1852 05/08/23  2347 05/09/23  0603 05/09/23  0737   POCGLU 121 137 128 122       Blood Sugar Average: Last 72 hrs:  - Hold home medications   - Start sliding scale insulin q6h with q6h glucose checks

## 2023-05-09 NOTE — ASSESSMENT & PLAN NOTE
· Hx CABG 2020  · Hold AC/AP for now  · Hold Xarelto  · Continue metoprolol   · Continue statin  · Continue prn labetalol for HTN

## 2023-05-10 ENCOUNTER — TELEPHONE (OUTPATIENT)
Dept: GASTROENTEROLOGY | Facility: AMBULARY SURGERY CENTER | Age: 73
End: 2023-05-10

## 2023-05-10 LAB
ALBUMIN SERPL BCP-MCNC: 3.3 G/DL (ref 3.5–5)
ALP SERPL-CCNC: 235 U/L (ref 34–104)
ALT SERPL W P-5'-P-CCNC: 61 U/L (ref 7–52)
ANA SER QL IA: NEGATIVE
ANION GAP SERPL CALCULATED.3IONS-SCNC: 4 MMOL/L (ref 4–13)
AST SERPL W P-5'-P-CCNC: 47 U/L (ref 13–39)
BASOPHILS # BLD MANUAL: 0 THOUSAND/UL (ref 0–0.1)
BASOPHILS NFR MAR MANUAL: 0 % (ref 0–1)
BILIRUB DIRECT SERPL-MCNC: 3.6 MG/DL (ref 0–0.2)
BILIRUB SERPL-MCNC: 6.42 MG/DL (ref 0.2–1)
BUN SERPL-MCNC: 23 MG/DL (ref 5–25)
CALCIUM ALBUM COR SERPL-MCNC: 8.8 MG/DL (ref 8.3–10.1)
CALCIUM SERPL-MCNC: 8.2 MG/DL (ref 8.4–10.2)
CHLORIDE SERPL-SCNC: 99 MMOL/L (ref 96–108)
CO2 SERPL-SCNC: 33 MMOL/L (ref 21–32)
CREAT SERPL-MCNC: 1.18 MG/DL (ref 0.6–1.3)
EOSINOPHIL # BLD MANUAL: 0 THOUSAND/UL (ref 0–0.4)
EOSINOPHIL NFR BLD MANUAL: 0 % (ref 0–6)
ERYTHROCYTE [DISTWIDTH] IN BLOOD BY AUTOMATED COUNT: 14.4 % (ref 11.6–15.1)
GFR SERPL CREATININE-BSD FRML MDRD: 61 ML/MIN/1.73SQ M
GLUCOSE SERPL-MCNC: 120 MG/DL (ref 65–140)
GLUCOSE SERPL-MCNC: 133 MG/DL (ref 65–140)
GLUCOSE SERPL-MCNC: 135 MG/DL (ref 65–140)
GLUCOSE SERPL-MCNC: 136 MG/DL (ref 65–140)
GLUCOSE SERPL-MCNC: 144 MG/DL (ref 65–140)
GLUCOSE SERPL-MCNC: 147 MG/DL (ref 65–140)
HCT VFR BLD AUTO: 28.9 % (ref 36.5–49.3)
HGB BLD-MCNC: 9.4 G/DL (ref 12–17)
LG PLATELETS BLD QL SMEAR: PRESENT
LYMPHOCYTES # BLD AUTO: 0.82 THOUSAND/UL (ref 0.6–4.47)
LYMPHOCYTES # BLD AUTO: 5 % (ref 14–44)
MCH RBC QN AUTO: 31.5 PG (ref 26.8–34.3)
MCHC RBC AUTO-ENTMCNC: 32.5 G/DL (ref 31.4–37.4)
MCV RBC AUTO: 97 FL (ref 82–98)
MONOCYTES # BLD AUTO: 0.82 THOUSAND/UL (ref 0–1.22)
MONOCYTES NFR BLD: 5 % (ref 4–12)
MYELOCYTES NFR BLD MANUAL: 2 % (ref 0–1)
NEUTROPHILS # BLD MANUAL: 14.34 THOUSAND/UL (ref 1.85–7.62)
NEUTS BAND NFR BLD MANUAL: 1 % (ref 0–8)
NEUTS SEG NFR BLD AUTO: 87 % (ref 43–75)
PLATELET # BLD AUTO: 142 THOUSANDS/UL (ref 149–390)
PLATELET BLD QL SMEAR: ADEQUATE
PMV BLD AUTO: 10.3 FL (ref 8.9–12.7)
POTASSIUM SERPL-SCNC: 4.1 MMOL/L (ref 3.5–5.3)
PROT SERPL-MCNC: 5.7 G/DL (ref 6.4–8.4)
RBC # BLD AUTO: 2.98 MILLION/UL (ref 3.88–5.62)
RBC MORPH BLD: NORMAL
SODIUM SERPL-SCNC: 136 MMOL/L (ref 135–147)
WBC # BLD AUTO: 16.3 THOUSAND/UL (ref 4.31–10.16)

## 2023-05-10 RX ORDER — INSULIN LISPRO 100 [IU]/ML
1-6 INJECTION, SOLUTION INTRAVENOUS; SUBCUTANEOUS
Status: DISCONTINUED | OUTPATIENT
Start: 2023-05-10 | End: 2023-05-15 | Stop reason: HOSPADM

## 2023-05-10 RX ORDER — ACETAZOLAMIDE 250 MG/1
500 TABLET ORAL EVERY 12 HOURS SCHEDULED
Status: DISCONTINUED | OUTPATIENT
Start: 2023-05-10 | End: 2023-05-15 | Stop reason: HOSPADM

## 2023-05-10 RX ORDER — POLYETHYLENE GLYCOL 3350 17 G/17G
17 POWDER, FOR SOLUTION ORAL DAILY PRN
Status: DISCONTINUED | OUTPATIENT
Start: 2023-05-10 | End: 2023-05-15 | Stop reason: HOSPADM

## 2023-05-10 RX ADMIN — OXYCODONE HYDROCHLORIDE 5 MG: 5 TABLET ORAL at 05:32

## 2023-05-10 RX ADMIN — METOPROLOL TARTRATE 25 MG: 25 TABLET, FILM COATED ORAL at 22:29

## 2023-05-10 RX ADMIN — PANTOPRAZOLE SODIUM 40 MG: 40 TABLET, DELAYED RELEASE ORAL at 08:28

## 2023-05-10 RX ADMIN — HEPARIN SODIUM 5000 UNITS: 5000 INJECTION INTRAVENOUS; SUBCUTANEOUS at 22:35

## 2023-05-10 RX ADMIN — METHOCARBAMOL TABLETS 500 MG: 500 TABLET, COATED ORAL at 22:30

## 2023-05-10 RX ADMIN — LEVALBUTEROL HYDROCHLORIDE 1.25 MG: 1.25 SOLUTION RESPIRATORY (INHALATION) at 13:46

## 2023-05-10 RX ADMIN — PRAVASTATIN SODIUM 80 MG: 80 TABLET ORAL at 17:24

## 2023-05-10 RX ADMIN — EZETIMIBE 10 MG: 10 TABLET ORAL at 17:24

## 2023-05-10 RX ADMIN — METHOCARBAMOL TABLETS 500 MG: 500 TABLET, COATED ORAL at 05:18

## 2023-05-10 RX ADMIN — PANTOPRAZOLE SODIUM 40 MG: 40 TABLET, DELAYED RELEASE ORAL at 22:30

## 2023-05-10 RX ADMIN — LACOSAMIDE 100 MG: 100 TABLET, FILM COATED ORAL at 22:29

## 2023-05-10 RX ADMIN — LIDOCAINE 5% 1 PATCH: 700 PATCH TOPICAL at 08:27

## 2023-05-10 RX ADMIN — SENNOSIDES 8.6 MG: 8.6 TABLET, FILM COATED ORAL at 22:29

## 2023-05-10 RX ADMIN — METOPROLOL TARTRATE 25 MG: 25 TABLET, FILM COATED ORAL at 08:28

## 2023-05-10 RX ADMIN — HEPARIN SODIUM 5000 UNITS: 5000 INJECTION INTRAVENOUS; SUBCUTANEOUS at 13:39

## 2023-05-10 RX ADMIN — METHOCARBAMOL TABLETS 500 MG: 500 TABLET, COATED ORAL at 13:39

## 2023-05-10 RX ADMIN — LACOSAMIDE 100 MG: 100 TABLET, FILM COATED ORAL at 08:28

## 2023-05-10 RX ADMIN — ACETAZOLAMIDE 500 MG: 250 TABLET ORAL at 22:29

## 2023-05-10 RX ADMIN — DOCUSATE SODIUM 100 MG: 100 CAPSULE, LIQUID FILLED ORAL at 17:24

## 2023-05-10 RX ADMIN — HEPARIN SODIUM 5000 UNITS: 5000 INJECTION INTRAVENOUS; SUBCUTANEOUS at 05:18

## 2023-05-10 RX ADMIN — ACETAMINOPHEN 650 MG: 325 TABLET ORAL at 11:21

## 2023-05-10 RX ADMIN — POLYETHYLENE GLYCOL 3350 17 G: 17 POWDER, FOR SOLUTION ORAL at 09:53

## 2023-05-10 RX ADMIN — DOCUSATE SODIUM 100 MG: 100 CAPSULE, LIQUID FILLED ORAL at 08:28

## 2023-05-10 NOTE — PROGRESS NOTES
[facility]  Progress Note  Name: Cinthia Villalobos  MRN: [id number]  Unit/Bed#: S -01 I Date of Admission: 5/3/2023   Date of Service: 5/10/2023 I Hospital Day: 7    Assessment/Plan   * Kidney capsule rupture, left, initial encounter  Assessment & Plan  · Non-traumatic  · Patient was reportedly in his usual state of health when he awoke with sudden onset left abdominal/flank pain  Review of notes from [facility] -- he does have history of renal cysts/stones in the past  He is on Xarelto, and was given ASA by his wife prior to arrival to hospital   · CT A/P:  Large subcapsular hematoma compressing the left kidney measuring 13 5 x 15 8 x 9 7 cm and up to 6 2 cm in thickness  Linear hyperdensities within the posterior aspect of the hematoma compatible with active extravasation  There is surrounding perinephric and retroperitoneal hemorrhage  · Received Kcentra and DDAVP for reversal  · Resuscitated with blood products as below  · IR consulted for embolization / bleeding control  · Lower pole initially embolized and patient redeveloped shock  Returned to IR for repeat embolization of lower pole  · Now remains with stable hemodynamics and h/h    Plan:   · Continue 3 way potter, will need CBI if bloody UO noted  · If re-bleeds will likely need emergent nephrectomy for bleeding control  · Serial abdominal exams  · Trend hgb as below  · Urology consulted - input appreciated   · Urology recommends outpatient MRI of left kidney, f/u in 6 weeks with urology  · Monitor UO and renal indices closely      Acute respiratory insufficiency  Assessment & Plan  -requiring 3L NC oxygen to maintain SpO2>92%  -Lung sounds with bilateral upper wheezing, not resolved with racemic epinephrine or lasix  -CT chest with moderate left pleural effusion, small right pleural effusion  -continue scheduled Xopenex neb treatments  -wean oxygen as tolerated    Hyperbilirubinemia  Assessment & Plan  -noted 5/8  -no history of hepatitis, cirrhosis, alcohol use, or LFT abnormalities  -denies RUQ abdominal pain  - T bili 8 94->7 7->6 42, D bili 6 05-> 4 66->4 66  -->266->235, ->66->47, ALT 93->74->61  -RUQ US with cholelithiasis, no evidence of acute cholecystitis  -GGT elevated, 171  -lipase, ammonia WNL  -GI following, notes likely ischemic hepatitis, however does recommend MRCP  -pending MRCP results  -repeat CMP in am      Acute blood loss anemia  Assessment & Plan  Lab Results   Component Value Date    HGB 9 4 (L) 05/10/2023    HGB 8 6 (L) 05/09/2023    HGB 8 3 (L) 05/08/2023    HGB 7 9 (L) 05/07/2023       · 2/2 kidney capsule rupture in the setting of coagulopathy on home Xarelto and ASA in route to ER  · Reversed with Suzie Matar and DDAVP  · Total Blood products to date  · PRBCs 4 units  · FFP 4 units  · Transfuse for HGB < 7 or hemodynamic instability  · Required levophed for short period of time in IR -- weaned off prior to completion of procedure  · Maintain MAP > 65 mmHg  · Follow H&H    Coagulopathy (HCC)  Assessment & Plan  · INR 4 33 on arrival -- likely falsely elevated 2/2 Xarelto  Also received ASA by EMS prior to arrival   · Reversed on admission with Suzie Matar and DDAVP  · Holding AC/AP due to hemorrhagic shock  · INR most recently 1 21  · Hgb stable    Lab Results   Component Value Date    INR 1 21 (H) 05/09/2023    HGB 9 4 (L) 05/10/2023    HGB 8 6 (L) 05/09/2023    HGB 8 3 (L) 05/08/2023    HGB 7 9 (L) 05/07/2023         Paroxysmal atrial fibrillation (HCC)  Assessment & Plan  · Home regimen: Xarelto and metoprolol  · Holding Xarelto for now, in the setting of hemorrhagic shock/kidney capsule rupture  · Continue telemetry  · Patient is now hemodynamically stable s/p volume resuscitation with blood products and IR procedure for embolization  Was on vasopressors for short period of time during volume resuscitation  No longer on vasopressors    · Continue metoprolol  · Holding AC/AP 2/2 ABLA with shock on admission in the setting of renal capsule rupture    Leukemia ([facility] [address] )  Assessment & Plan  · S/p chemotherapy -- currently in remission for 14 years  Seizure disorder [facility])  Assessment & Plan  · Last seizure: July 2022 (was off medication at time of seizure due to cost)  · Currently well controlled with Vimpat  · Continue home Vimpat  CAD (coronary artery disease)  Assessment & Plan  · Hx CABG 2020  · Hold AC/AP for now  · Hold Xarelto  · Continue metoprolol   · Continue statin  · Continue prn labetalol for HTN      Type 2 diabetes mellitus [facility])  Assessment & Plan  Lab Results   Component Value Date    HGBA1C 5 9 (H) 04/20/2023       Recent Labs     05/10/23  0004 05/10/23  0537 05/10/23  1157 05/10/23  1605   POCGLU 135 133 147* 136       Blood Sugar Average: Last 72 hrs:  - Hold home medications   - Start sliding scale insulin q6h with q6h glucose checks    SHELIA (acute kidney injury) (HCC)-resolved as of 5/9/2023  Assessment & Plan  Lab Results   Component Value Date    CREATININE 1 18 05/10/2023    CREATININE 1 21 05/09/2023    CREATININE 1 20 05/09/2023     · 2/2 hemorrhagic shock, contrast exposure, left kidney capsule rupture  · Baseline creatinine 0 9, on admission 1 18 - improving  · 3-way potter placed in ER in anticipation of possible hematuria - none noted today 5/7/23    Plan:  · IVF discontinued  · Potter catheter discontinued  · Monitor UO and renal indices  · Avoid hypotension, nephrotoxins  · Monitor fluid status                Bowel Regimen: senokot, colace  VTE Prophylaxis:Heparin     Disposition: pending MRCP, clinical improvement    Subjective   Chief Complaint: Slight abdominal bloating    Subjective:   Pt alert, conversant, states he slept well overnight  Does not have much of an appetite today, however denies shortness of breath, abdominal pain, nausea, or vomiting  Feels his abdomen is slightly bloated from not having bowel movement for past several days    Per nursing, no acute events overnight  Objective   Vitals:   Temp:  [97 9 °F (36 6 °C)-99 2 °F (37 3 °C)] 99 2 °F (37 3 °C)  HR:  [86-94] 91  Resp:  [16-18] 16  BP: (170-180)/(82-98) 180/88    I/O       05/09 0701  05/10 0700 05/10 0701 05/11 0700    P  O  718 340    Total Intake(mL/kg) 718 (7 3) 340 (3 5)    Urine (mL/kg/hr) 3325 (1 4) 800 (0 6)    Total Output 3325 800    Net -1154 -032                 Physical Exam:   GENERAL APPEARANCE: Alert, in no apparent distress  NEURO: Oriented x4  HEENT: Mild scleral icterus, EOMI  Moist oral mucosa  CV: RRR, S1 S2 without murmurs, gallops, or rubs  LUNGS: Mild end expiratory wheezing in the upper fields, no rales or rhonchi appreciated  No increased work of breathing, no evidence of respiratory distress  GI: Abdomen soft but mildly distended, nontender to palpation  : Voiding spontaneously  MSK: Moving all extremities spontaneously  SKIN: Warm, dry, intact with mild jaundice  No rashes appreciated    Invasive Devices     Peripheral Intravenous Line  Duration           Peripheral IV 05/08/23 Distal;Dorsal (posterior); Right Forearm 2 days          Drain  Duration           External Urinary Catheter 1 day                      Lab Results:   Results: I have personally reviewed all pertinent laboratory/tests results, BMP/CMP:   Lab Results   Component Value Date    SODIUM 136 05/10/2023    K 4 1 05/10/2023    CL 99 05/10/2023    CO2 33 (H) 05/10/2023    BUN 23 05/10/2023    CREATININE 1 18 05/10/2023    CALCIUM 8 2 (L) 05/10/2023    AST 47 (H) 05/10/2023    ALT 61 (H) 05/10/2023    ALKPHOS 235 (H) 05/10/2023    EGFR 61 05/10/2023    and CBC:   Lab Results   Component Value Date    WBC 16 30 (H) 05/10/2023    HGB 9 4 (L) 05/10/2023    HCT 28 9 (L) 05/10/2023    MCV 97 05/10/2023     (L) 05/10/2023    MCH 31 5 05/10/2023    MCHC 32 5 05/10/2023    RDW 14 4 05/10/2023    MPV 10 3 05/10/2023     Imaging: I have personally reviewed pertinent reports     and I have personally reviewed pertinent films in PACS

## 2023-05-10 NOTE — ASSESSMENT & PLAN NOTE
· Non-traumatic  · Patient was reportedly in his usual state of health when he awoke with sudden onset left abdominal/flank pain  Review of notes from [facility] -- he does have history of renal cysts/stones in the past  He is on Xarelto, and was given ASA by his wife prior to arrival to hospital   · CT A/P:  Large subcapsular hematoma compressing the left kidney measuring 13 5 x 15 8 x 9 7 cm and up to 6 2 cm in thickness  Linear hyperdensities within the posterior aspect of the hematoma compatible with active extravasation  There is surrounding perinephric and retroperitoneal hemorrhage  · Received Kcentra and DDAVP for reversal  · Resuscitated with blood products as below  · IR consulted for embolization / bleeding control  · Lower pole initially embolized and patient redeveloped shock  Returned to IR for repeat embolization of lower pole  · Now remains with stable hemodynamics and h/h    Plan:   · Continue 3 way potter, will need CBI if bloody UO noted  · If re-bleeds will likely need emergent nephrectomy for bleeding control  · Serial abdominal exams  · Trend hgb as below  · Urology consulted - input appreciated   · Urology recommends outpatient MRI of left kidney, f/u in 6 weeks with urology  · Monitor UO and renal indices closely

## 2023-05-10 NOTE — PLAN OF CARE
Problem: MOBILITY - ADULT  Goal: Maintain or return to baseline ADL function  Description: INTERVENTIONS:  -  Assess patient's ability to carry out ADLs; assess patient's baseline for ADL function and identify physical deficits which impact ability to perform ADLs (bathing, care of mouth/teeth, toileting, grooming, dressing, etc )  - Assess/evaluate cause of self-care deficits   - Assess range of motion  - Assess patient's mobility; develop plan if impaired  - Assess patient's need for assistive devices and provide as appropriate  - Encourage maximum independence but intervene and supervise when necessary  - Involve family in performance of ADLs  - Assess for home care needs following discharge   - Consider OT consult to assist with ADL evaluation and planning for discharge  - Provide patient education as appropriate  Outcome: Progressing     Problem: Prexisting or High Potential for Compromised Skin Integrity  Goal: Skin integrity is maintained or improved  Description: INTERVENTIONS:  - Identify patients at risk for skin breakdown  - Assess and monitor skin integrity  - Assess and monitor nutrition and hydration status  - Monitor labs   - Assess for incontinence   - Turn and reposition patient  - Assist with mobility/ambulation  - Relieve pressure over bony prominences  - Avoid friction and shearing  - Provide appropriate hygiene as needed including keeping skin clean and dry  - Evaluate need for skin moisturizer/barrier cream  - Collaborate with interdisciplinary team   - Patient/family teaching  - Consider wound care consult   Outcome: Progressing     Problem: SAFETY,RESTRAINT: NV/NON-SELF DESTRUCTIVE BEHAVIOR  Goal: Remains free of harm/injury (restraint for non violent/non self-detsructive behavior)  Description: INTERVENTIONS:  - Instruct patient/family regarding restraint use   - Assess and monitor physiologic and psychological status   - Provide interventions and comfort measures to meet assessed patient needs   - Identify and implement measures to help patient regain control  - Assess readiness for release of restraint   Outcome: Progressing  Goal: Returns to optimal restraint-free functioning  Description: INTERVENTIONS:  - Assess the patient's behavior and symptoms that indicate continued need for restraint  - Identify and implement measures to help patient regain control  - Assess readiness for release of restraint   Outcome: Progressing     Problem: Nutrition/Hydration-ADULT  Goal: Nutrient/Hydration intake appropriate for improving, restoring or maintaining nutritional needs  Description: Monitor and assess patient's nutrition/hydration status for malnutrition  Collaborate with interdisciplinary team and initiate plan and interventions as ordered  Monitor patient's weight and dietary intake as ordered or per policy  Utilize nutrition screening tool and intervene as necessary  Determine patient's food preferences and provide high-protein, high-caloric foods as appropriate       INTERVENTIONS:  - Monitor oral intake, urinary output, labs, and treatment plans  - Assess nutrition and hydration status and recommend course of action  - Evaluate amount of meals eaten  - Assist patient with eating if necessary   - Allow adequate time for meals  - Recommend/ encourage appropriate diets, oral nutritional supplements, and vitamin/mineral supplements  - Order, calculate, and assess calorie counts as needed  - Recommend, monitor, and adjust tube feedings and TPN/PPN based on assessed needs  - Assess need for intravenous fluids  - Provide specific nutrition/hydration education as appropriate  - Include patient/family/caregiver in decisions related to nutrition  Outcome: Not Progressing

## 2023-05-10 NOTE — ASSESSMENT & PLAN NOTE
Lab Results   Component Value Date    HGBA1C 5 9 (H) 04/20/2023       Recent Labs     05/10/23  0004 05/10/23  0537 05/10/23  1157 05/10/23  1605   POCGLU 135 133 147* 136       Blood Sugar Average: Last 72 hrs:  - Hold home medications   - Start sliding scale insulin q6h with q6h glucose checks

## 2023-05-10 NOTE — ASSESSMENT & PLAN NOTE
-noted 5/8  -no history of hepatitis, cirrhosis, alcohol use, or LFT abnormalities  -denies RUQ abdominal pain  - T bili 8 94->7 7->6 42, D bili 6 05-> 4 66->4 66  -->266->235, ->66->47, ALT 93->74->61  -RUQ US with cholelithiasis, no evidence of acute cholecystitis  -GGT elevated, 171  -lipase, ammonia WNL  -GI following, notes likely ischemic hepatitis, however does recommend MRCP  -pending MRCP results  -repeat CMP in am

## 2023-05-10 NOTE — PROGRESS NOTES
Progress Note - Geriatric Medicine   Manuel Gonzalez II 72 y o  male MRN: [id number]  Unit/Bed#: S -01 Encounter: [id number]      Assessment/Plan:  1 -Multiple pancreatic cystic lesions most of which communicate with the dorsal pancreatic duct  -  The largest lesion is seen in the body of the pancreas which measures about 2 5 cm  Lesions seen are of concern for pancreatic mucinous neoplasm  Largest lesion measures 2 5 cm in the mid body of the pancreas with suggestion of nodularity within it  Consider GI consult for endoscopic ultrasound of the given area when his acute illness subsides  2 -  Large left perinephric hematoma encasing the left kidney  -    3 --  Lesions in the L1 vertebra and T11 vertebra  -  A 1 2 cm lesion seen within the L1 vertebra and T11 vertebra  Could be secondary to Schmorl's nodes  4 -  Moderate left effusion    5 -  Normochromic normocytic anemia -  Patient's hemoglobin of 9 4 with a hematocrit of 28 9 at present  6 -  Hyperbilirubinemia  -  This was noted on May 8 no previous history of hepatitis, cirrhosis or alcohol use  Direct total bili has been declining and assessed direct bilirubin has been improving slowly  7 -  Acute blood loss anemia -This was reversed with Kcentra and DDAVP    8 -  Paroxysmal atrial fibrillation -Patient has been on Xarelto in the past currently on hold due to bleed  9 -  History of seizure disorder -Patient had been on Vimpat in the past    10 -  Parkinsonism -Patient follows with [facility] neurology he was recently seen on April 18, 2023's been taking Sinemet at home would resume medications  Wife will be bringing medications in     11 -  Early satiety -Patient states that he feels full after a few bites he does complain of discomfort in the mid abdomen radiating to his back  12 -  Large left pleural effusion -  Patient is to continue with diuretic usage      13 -  Diabetes mellitus type 2 -Last hemoglobin A1c 5 9 medications currently "on hold  Subjective:   Patient states that he is less short of breath today  He has had a total of 3 bowel movements since admission  Denies any active pain at present  He tells me he was up and walking today  Review of Systems   Constitutional: Negative  HENT: Negative  Eyes: Negative  Respiratory: Positive for shortness of breath  Cardiovascular: Negative  Gastrointestinal: Positive for abdominal distention  Endocrine:        History of diabetes mellitus type 2   Genitourinary: Negative  Musculoskeletal: Negative  Skin: Negative  Neurological: Negative  Psychiatric/Behavioral: Negative  Objective:     Vitals: Blood pressure 170/82, pulse 94, temperature 97 9 °F (36 6 °C), resp  rate 18, height 5' 10\" (1 778 m), weight 97 9 kg (215 lb 12 8 oz), SpO2 96 %  ,Body mass index is 30 96 kg/m²  Intake/Output Summary (Last 24 hours) at 5/10/2023 0934  Last data filed at 5/10/2023 0830  Gross per 24 hour   Intake 718 ml   Output 3825 ml   Net -3107 ml       Current Medications: Reviewed    Physical Exam:   Physical Exam  HENT:      Mouth/Throat:      Mouth: Mucous membranes are moist    Eyes:      Pupils: Pupils are equal, round, and reactive to light  Cardiovascular:      Rate and Rhythm: Normal rate  Rhythm irregular  Pulses: Normal pulses  Heart sounds: Normal heart sounds  Pulmonary:      Effort: Pulmonary effort is normal       Comments: Patient with decreased breath sounds in bases  Abdominal:      General: There is distension  Musculoskeletal:      Cervical back: Neck supple  Skin:     General: Skin is dry  Neurological:      Mental Status: He is alert and oriented to person, place, and time  Psychiatric:         Mood and Affect: Mood normal           Invasive Devices     Peripheral Intravenous Line  Duration           Peripheral IV 05/08/23 Distal;Dorsal (posterior); Right Forearm 1 day          Drain  Duration           External Urinary " Catheter <1 day                Lab, Imaging and other studies: I have personally reviewed pertinent reports

## 2023-05-10 NOTE — QUICK NOTE
After updating patient about his MRI from GI standpoint, he asked me to call his wife Christine Quispe with an update  This was my first interaction with patient's wife  I called and provided update from GI standpoint and asked if she had any questions  From that point forward she continued to escalate, get more aggressive, and raise her voice at me in regards to her complaints with her 's care  She informed me that the food was poor, he could not order what he wanted and has not been eating, that she had an altercation with a nurse a few days ago which she was unhappy with, and that she has not been receiving updates from the team in regards to his care  I apologized for the above and advised the patient's wife multiple times that I was not necessarily the appropriate party to address these concerns directly but that I would be happy to relay the message to his primary team and the  to call her and address  She continued to raise her voice, get more angry, and threaten to go to higher ups in [facility] as well as take her  out of the hospital to somewhere else  I attempted to de-escalate the encounter multiple times  She stated that we don't want to see her angry in a threatening tone  I eventually advised patient's wife that I would be hanging up as I did not appreciate her demeanor and that I would relay the information to the appropriate teams  Sent tiger text summarizing the above to 2nd  Sebastian Dupree and primary team MAGNUS Conte as well as patient's current nurse today

## 2023-05-10 NOTE — ASSESSMENT & PLAN NOTE
· INR 4 33 on arrival -- likely falsely elevated 2/2 Xarelto    Also received ASA by EMS prior to arrival   · Reversed on admission with [facility] and DDAVP  · Holding AC/AP due to hemorrhagic shock  · INR most recently 1 21  · Hgb stable    Lab Results   Component Value Date    INR 1 21 (H) 05/09/2023    HGB 9 4 (L) 05/10/2023    HGB 8 6 (L) 05/09/2023    HGB 8 3 (L) 05/08/2023    HGB 7 9 (L) 05/07/2023

## 2023-05-10 NOTE — ASSESSMENT & PLAN NOTE
Lab Results   Component Value Date    CREATININE 1 18 05/10/2023    CREATININE 1 21 05/09/2023    CREATININE 1 20 05/09/2023     · 2/2 hemorrhagic shock, contrast exposure, left kidney capsule rupture  · Baseline creatinine 0 9, on admission 1 18 - improving  · 3-way potter placed in ER in anticipation of possible hematuria - none noted today 5/7/23    Plan:  · IVF discontinued  · Potter catheter discontinued  · Monitor UO and renal indices  · Avoid hypotension, nephrotoxins  · Monitor fluid status

## 2023-05-10 NOTE — PROGRESS NOTES
"Progress Note - Ioana Hayden II 72 y o  male MRN: [id number]    Unit/Bed#: S -01 Encounter: [id number]    Assessment and Plan:   Principal Problem:    Kidney capsule rupture, left, initial encounter  Active Problems:    Type 2 diabetes mellitus (HCC)    CAD (coronary artery disease)    Seizure disorder (HCC)    Leukemia ([address] )    Splenic hemorrhage    Paroxysmal atrial fibrillation (HCC)    Coagulopathy (HCC)    Acute respiratory insufficiency    Acute blood loss anemia    Hyperbilirubinemia    #1  Elevated LFTs: suspected to be ischemic hepatitis versus drug induced liver injury, slowly improving  -continue to monitor LFTs, can take weeks for these to normalize, can continue to monitor with conservative management as long as they are not worsening  -MRCP without any obstruction  #2  Pancreatic lesion: MRI notable for mid body pancreas lesion of 2 5 cm with some worrisome stigmata as well as smaller pancreatic cysts   -patient will need endoscopic ultrasound, this will likely be pursued in the outpatient setting in the next few weeks once patient's acute illness has improved  Will discuss this further with patient as the results of the MRI came back after I saw patient this morning    ----------------------------------------------------------------------------------------------------------------    Subjective:     Reports being tired, some abdominal discomfort  Objective:     Vitals: Blood pressure (!) 173/98, pulse 86, temperature 98 6 °F (37 °C), resp  rate 18, height 5' 10\" (1 778 m), weight 97 9 kg (215 lb 12 8 oz), SpO2 95 %  ,Body mass index is 30 96 kg/m²        Intake/Output Summary (Last 24 hours) at 5/10/2023 1055  Last data filed at 5/10/2023 0830  Gross per 24 hour   Intake 598 ml   Output 3825 ml   Net -3227 ml       Physical Exam:     General Appearance: Alert, appears stated age and cooperative; jaundice and scleral icterus present  Lungs: decreased breath sounds at bases bilaterally, no " rales or rhonchi, no labored breathing/accessory muscle use  Heart: Regular rate, irregular rhythm, S1, S2 normal, no murmur, click, rub or gallop  Abdomen: Soft, non-tender, distended; bowel sounds normal; no masses or no organomegaly  Extremities: No cyanosis, clubbing; minimal LE edema    Invasive Devices     Peripheral Intravenous Line  Duration           Peripheral IV 05/08/23 Distal;Dorsal (posterior); Right Forearm 1 day          Drain  Duration           External Urinary Catheter <1 day                Lab Results:  Results from last 7 days   Lab Units 05/10/23  0429 05/04/23  0113 05/03/23  1858   WBC Thousand/uL 16 30*   < > 22 63*   HEMOGLOBIN g/dL 9 4*   < > 11 0*   HEMATOCRIT % 28 9*   < > 32 5*   PLATELETS Thousands/uL 142*   < > 130*   NEUTROS PCT %  --   --  85*   LYMPHS PCT %  --   --  3*   LYMPHO PCT % 5*   < >  --    MONOS PCT %  --   --  8   MONO PCT % 5   < >  --    EOS PCT % 0   < > 3    < > = values in this interval not displayed  Results from last 7 days   Lab Units 05/10/23  0429 05/03/23  1858 05/03/23  1717   POTASSIUM mmol/L 4 1   < >  --    CHLORIDE mmol/L 99   < >  --    CO2 mmol/L 33*   < >  --    CO2, I-STAT mmol/L  --   --  22   BUN mg/dL 23   < >  --    CREATININE mg/dL 1 18   < >  --    CALCIUM mg/dL 8 2*   < >  --    ALK PHOS U/L 235*   < >  --    ALT U/L 61*   < >  --    AST U/L 47*   < >  --    GLUCOSE, ISTAT mg/dl  --   --  236*    < > = values in this interval not displayed  Invalid input(s): BILI  Results from last 7 days   Lab Units 05/09/23  0506   INR  1 21*     Results from last 7 days   Lab Units 05/08/23  0909   LIPASE u/L 27       Imaging Studies: I have personally reviewed pertinent imaging studies  XR chest portable    Result Date: 5/9/2023  Impression: No interval change   Workstation performed: [id number]     XR chest portable    Result Date: 5/9/2023  Impression: Bilateral pleural effusions, larger on the left and lower lobe compressive atelectasis versus infiltrates  Workstation performed: [id number]     XR chest portable    Result Date: 5/5/2023  Impression: Small left and trace right effusion  Workstation performed: [id number]     XR chest 1 view portable    Result Date: 5/3/2023  Impression: No free air is seen under the diaphragms  There is a small left effusion and mild bibasilar subsegmental atelectasis Workstation performed: [id number]     XR abdomen 1 view kub    Result Date: 5/3/2023  Impression: No evidence of bowel obstruction Workstation performed: [id number]     CT chest wo contrast    Result Date: 5/7/2023  Impression: Moderate left and small right effusion with compressive atelectasis of the lower lobes  No acute pulmonary disease  Partially imaged left perinephric hematoma with minimal ascites  Workstation performed: [id number]     X-ray chest 1 view    Result Date: 5/4/2023  Impression: Satisfactory position of the endotracheal tube  Workstation performed: [id number]     CTA chest (pe study) abdomen pelvis routine contrast    Result Date: 5/3/2023  Impression: Increased size of the known left perinephric hematoma status post left renal arterial embolization with a site of recurrent posterior perinephric active hemorrhage  Severe mass effect from the hematoma on the left kidney  There is also increased intra-abdominal hemorrhage  Small left and trace right pleural effusion; no acute pulmonary arterial embolism  Diffuse esophageal dilation with moderate circumferential thickening of the distal esophagus in keeping with a nonspecific esophagitis  I personally discussed this study with [facility] on 5/3/2023 4:50 PM  Workstation performed: [id number]     US right upper quadrant    Result Date: 5/8/2023  Impression: Visualized portion of CBD normal in caliber measuring 4 mm  No intrahepatic biliary ductal dilatation  Multiple gallstones within the gallbladder, however no evidence of acute cholecystitis  Right upper quadrant ascites   Pancreas not visualized due to overlying bowel gas  Workstation performed: [id number]     CT abdomen pelvis with contrast    Result Date: 5/3/2023  Impression: 1  Large subcapsular hematoma compressing the left kidney measuring approximately 13 5 x 15 8 x 9 7 cm and up to 6 2 cm in thickness  Linear hyperdensities within the posterior aspect of the hematoma compatible with active extravasation  There is surrounding perinephric and retroperitoneal hemorrhage  2   Vague hypodensities are seen within the superior aspect of the spleen measuring up to 4 cm and 1 9 cm respectively  These areas may reflect splenic infarction less likely laceration  There are foci of hyperdensity at the superior aspect of the spleen  which was present on prior exam and likely represent a hemangioma versus calcification  3   Flattening of the IVC compatible with hypovolemia  4   Cholelithiasis without evidence of cholecystitis  5   Stable 2 1 cm cystic lesion within the body of the pancreas  6   Dense opacity in the left lower lobe which may be due to atelectasis versus pneumonia  Small left pleural effusion  I personally discussed the left renal and splenic findings of this study with Seamus Vasquez on 5/3/2023 3:17 AM  Workstation performed: [id number]     IR embolization (specify vessel or site)    Result Date: 5/4/2023  Impression: Impression: Active extravasation of contrast identified at the mid left kidney  Given patient's coagulopathy, the proximal and distal branches of the left renal artery was embolized using gelfoam and coils in alternating fashion  Completion left renal arteriogram showed no further perfusion of the left kidney   Completion aortogram and selective angiogram of the left L4 lumbar artery showed no evidence of active extravasation of contrast  Workstation performed: [id number]     IR embolization (specify vessel or site)    Result Date: 5/3/2023  Impression: Impression: Active bleeding from the lower pole of the left kidney, numerous small arterial branches feeding this bleeding which is likely venous in origin   Embolization of 4 feeding branches with Gelfoam and coils The kidney is compressed and distorted by the adjacent hematoma Workstation performed: [id number]

## 2023-05-10 NOTE — PHYSICAL THERAPY NOTE
PHYSICAL THERAPY NOTE          Patient Name: Larry Garcia Date: 5/10/2023         05/10/23 0930   PT Last Visit   PT Visit Date 05/10/23   Note Type   Note Type Treatment   Pain Assessment   Pain Assessment Tool 0-10   Pain Score No Pain   Pain Location/Orientation Location: Back   Effect of Pain on Daily Activities limits activity and ambulation distance   Patient's Stated Pain Goal No pain   Hospital Pain Intervention(s) Repositioned; Ambulation/increased activity; Emotional support; Rest   Multiple Pain Sites No   Pain Rating: FLACC (Rest) - Face 0   Pain Rating: FLACC (Rest) - Legs 0   Pain Rating: FLACC (Rest) - Activity 0   Pain Rating: FLACC (Rest) - Cry 0   Pain Rating: FLACC (Rest) - Consolability 0   Score: FLACC (Rest) 0   Pain Rating: FLACC (Activity) - Face 1   Pain Rating: FLACC (Activity) - Legs 0   Pain Rating: FLACC (Activity) - Activity 1   Pain Rating: FLACC (Activity) - Cry 1   Pain Rating: FLACC (Activity) - Consolability 1   Score: FLACC (Activity) 4   Restrictions/Precautions   Weight Bearing Precautions Per Order No   Other Precautions Cognitive; Chair Alarm; Bed Alarm; Fall Risk;Pain   General   Chart Reviewed Yes   Additional Pertinent History pt continues to stated dizziness and light headedness after STS transfers   Response to Previous Treatment Patient with no complaints from previous session  Family/Caregiver Present No   Cognition   Overall Cognitive Status WFL   Arousal/Participation Alert; Responsive; Cooperative   Attention Attends with cues to redirect   Orientation Level Oriented X4   Memory Within functional limits   Following Commands Follows one step commands without difficulty   Comments pt was pleasant and cooperative throughout tx session   Subjective   Subjective pt was agreeable to participate in PT intervention   Bed Mobility   Rolling R Unable to assess   Rolling L Unable to assess Supine to Sit Unable to assess   Sit to Supine 4  Minimal assistance   Additional items Assist x 1;HOB elevated; Bedrails; Increased time required;Verbal cues;LE management   Additional Comments pt began tx session seated OOB in the recliner and finished up tx session supine in the bed   Transfers   Sit to Stand 4  Minimal assistance   Additional items Assist x 1; Armrests; Increased time required;Verbal cues  (w/ RW)   Stand to Sit 4  Minimal assistance   Additional items Assist x 1; Armrests; Increased time required;Verbal cues  (w/ RW)   Stand pivot 4  Minimal assistance   Additional items Armrests;Assist x 1; Increased time required;Verbal cues  (w/ RW)   Additional Comments pt continues tyo require RW for all functional transfers to and from RW for increased safety and balance   Ambulation/Elevation   Gait pattern Decreased foot clearance; Short stride; Foward flexed; Excessively slow   Gait Assistance 4  Minimal assist   Additional items Assist x 1;Verbal cues   Assistive Device Rolling walker   Distance 60'x1 RW   Stair Management Assistance Not tested   Ambulation/Elevation Additional Comments (S)  stair trials not attempted due to fatigue after ambulation   Balance   Static Sitting Fair +   Dynamic Sitting Fair   Static Standing Poor +   Dynamic Standing Poor   Ambulatory Poor +  (w/ RW)   Activity Tolerance   Activity Tolerance Patient limited by fatigue   Nurse Made Aware Spoke to RN   Exercises   Hip Abduction Sitting;10 reps;AROM; Bilateral   Hip Adduction Sitting;10 reps;AROM; Bilateral  (pillow squqeezes)   Knee AROM Long Arc Quad Sitting;10 reps;AROM; Bilateral   Ankle Pumps Sitting;20 reps;AROM; Bilateral   Marching Sitting;10 reps;AROM; Bilateral   Assessment   Prognosis Fair   Problem List Decreased strength; Impaired balance;Decreased mobility; Decreased endurance;Pain   Assessment pt began tx session seated OOB in the recliner and was agreeable to participate in PT intervention  pt progressing with skilled PT intervention and demonstrated the ability to perform multiple functional transfers to and from RW and ambulation with RW all with min ax1 for safety and balance  pt did require VC's for hand placement while ascending to RW and descending to recliner/EOB  pt was able to increase ambulation distance to 60'x1 RW with min Ax1, no LOB  Additional not possible due to fatigue  pt was able to participate in TE activities in order to strengthen LE's and increase static/dynamic sitting balance  pt would benefit from continued skilled PT intervention in order to increase activity tolerance , functional mobility and LE strength  Continue to recommend post acute rehab services at the [facility] D/C in order to maximize pt functional independence and safety with all OOB mobility   Barriers to Discharge Inaccessible home environment   Goals   Patient Goals to get back into bed   STG Expiration Date 05/14/23   PT Treatment Day 3   Plan   Treatment/Interventions Functional transfer training;LE strengthening/ROM; Elevations; Therapeutic exercise; Endurance training;Patient/family training;Equipment eval/education; Bed mobility;Gait training;Spoke to nursing   Progress Slow progress, decreased activity tolerance   PT Frequency 3-5x/wk   Recommendation   PT Discharge Recommendation Post acute rehabilitation services   Equipment Recommended [facility] Recommended Wheeled walker   AM-PAC Basic Mobility Inpatient   Turning in Flat Bed Without Bedrails 3   Lying on Back to Sitting on Edge of Flat Bed Without Bedrails 3   Moving Bed to Chair 3   Standing Up From Chair Using Arms 3   Walk in Room 3   Climb 3-5 Stairs With Railing 1   Basic Mobility Inpatient Raw Score 16   Basic Mobility Standardized Score 38 32   Highest Level Of Mobility   JH-HLM Goal 5: Stand one or more mins   JH-HLM Achieved 7: Walk 25 feet or more   Education   Education Provided Mobility training;Assistive device   Patient Demonstrates acceptance/verbal understanding   End of Consult   Patient Position at End of Consult Supine;Bed/Chair alarm activated; All needs within reach   The patient's AM-PAC Basic Mobility Inpatient Short Form Raw Score is 16  A Raw score of less than or equal to 16 suggests the patient may benefit from discharge to post-acute rehabilitation services  Please also refer to the recommendation of the Physical Therapist for safe discharge planning       Melvin Cook

## 2023-05-10 NOTE — PLAN OF CARE
Problem: PHYSICAL THERAPY ADULT  Goal: Performs mobility at highest level of function for planned discharge setting  See evaluation for individualized goals  Description: Treatment/Interventions: Functional transfer training, LE strengthening/ROM, Therapeutic exercise, Cognitive reorientation, Patient/family training, Equipment eval/education, Bed mobility, Gait training, Spoke to nursing, Spoke to case management  Equipment Recommended: Eros Keita       See flowsheet documentation for full assessment, interventions and recommendations  Outcome: Progressing  Note: Prognosis: Fair  Problem List: Decreased strength, Impaired balance, Decreased mobility, Decreased endurance, Pain  Assessment: pt began tx session seated OOB in the recliner and was agreeable to participate in PT intervention  pt progressing with skilled PT intervention and demonstrated the ability to perform multiple functional transfers to and from RW and ambulation with RW all with min ax1 for safety and balance  pt did require VC's for hand placement while ascending to RW and descending to recliner/EOB  pt was able to increase ambulation distance to 60'x1 RW with min Ax1, no LOB  Additional not possible due to fatigue  pt was able to participate in TE activities in order to strengthen LE's and increase static/dynamic sitting balance  pt would benefit from continued skilled PT intervention in order to increase activity tolerance , functional mobility and LE strength  Continue to recommend post acute rehab services at the [facility] of D/C in order to maximize pt functional independence and safety with all OOB mobility  Barriers to Discharge: Inaccessible home environment  Barriers to Discharge Comments: 7+7 JESICA  PT Discharge Recommendation: Post acute rehabilitation services    See flowsheet documentation for full assessment

## 2023-05-11 LAB
ACTIN IGG SERPL-ACNC: 5 UNITS (ref 0–19)
ALBUMIN SERPL BCP-MCNC: 3.5 G/DL (ref 3.5–5)
ALP SERPL-CCNC: 225 U/L (ref 34–104)
ALT SERPL W P-5'-P-CCNC: 57 U/L (ref 7–52)
ANION GAP SERPL CALCULATED.3IONS-SCNC: 8 MMOL/L (ref 4–13)
AST SERPL W P-5'-P-CCNC: 47 U/L (ref 13–39)
BILIRUB SERPL-MCNC: 6.32 MG/DL (ref 0.2–1)
BUN SERPL-MCNC: 20 MG/DL (ref 5–25)
CALCIUM SERPL-MCNC: 8.6 MG/DL (ref 8.4–10.2)
CHLORIDE SERPL-SCNC: 101 MMOL/L (ref 96–108)
CMV DNA SERPL NAA+PROBE-ACNC: NEGATIVE IU/ML
CMV DNA SERPL NAA+PROBE-LOG IU: NORMAL LOG10 IU/ML
CO2 SERPL-SCNC: 26 MMOL/L (ref 21–32)
CREAT SERPL-MCNC: 1.17 MG/DL (ref 0.6–1.3)
EBV NA IGG SER IA-ACNC: 89.7 U/ML (ref 0–17.9)
EBV VCA IGG SER IA-ACNC: 190 U/ML (ref 0–17.9)
EBV VCA IGM SER IA-ACNC: <36 U/ML (ref 0–35.9)
ERYTHROCYTE [DISTWIDTH] IN BLOOD BY AUTOMATED COUNT: 14.5 % (ref 11.6–15.1)
GFR SERPL CREATININE-BSD FRML MDRD: 62 ML/MIN/1.73SQ M
GLUCOSE SERPL-MCNC: 130 MG/DL (ref 65–140)
GLUCOSE SERPL-MCNC: 132 MG/DL (ref 65–140)
GLUCOSE SERPL-MCNC: 138 MG/DL (ref 65–140)
GLUCOSE SERPL-MCNC: 149 MG/DL (ref 65–140)
GLUCOSE SERPL-MCNC: 179 MG/DL (ref 65–140)
HCT VFR BLD AUTO: 33.7 % (ref 36.5–49.3)
HGB BLD-MCNC: 10.8 G/DL (ref 12–17)
INR PPP: 1.3 (ref 0.84–1.19)
INTERPRETATION: ABNORMAL
MCH RBC QN AUTO: 31.7 PG (ref 26.8–34.3)
MCHC RBC AUTO-ENTMCNC: 32 G/DL (ref 31.4–37.4)
MCV RBC AUTO: 99 FL (ref 82–98)
MITOCHONDRIA M2 IGG SER-ACNC: <20 UNITS (ref 0–20)
NRBC BLD AUTO-RTO: 0 /100 WBCS
PLATELET # BLD AUTO: 154 THOUSANDS/UL (ref 149–390)
PMV BLD AUTO: 10.6 FL (ref 8.9–12.7)
POTASSIUM SERPL-SCNC: 3.8 MMOL/L (ref 3.5–5.3)
PROT SERPL-MCNC: 6.1 G/DL (ref 6.4–8.4)
PROTHROMBIN TIME: 16.4 SECONDS (ref 11.6–14.5)
RBC # BLD AUTO: 3.41 MILLION/UL (ref 3.88–5.62)
SODIUM SERPL-SCNC: 135 MMOL/L (ref 135–147)
WBC # BLD AUTO: 19.5 THOUSAND/UL (ref 4.31–10.16)

## 2023-05-11 RX ADMIN — PANTOPRAZOLE SODIUM 40 MG: 40 TABLET, DELAYED RELEASE ORAL at 09:10

## 2023-05-11 RX ADMIN — INSULIN LISPRO 1 UNITS: 100 INJECTION, SOLUTION INTRAVENOUS; SUBCUTANEOUS at 09:11

## 2023-05-11 RX ADMIN — EZETIMIBE 10 MG: 10 TABLET ORAL at 17:57

## 2023-05-11 RX ADMIN — DOCUSATE SODIUM 100 MG: 100 CAPSULE, LIQUID FILLED ORAL at 17:57

## 2023-05-11 RX ADMIN — HEPARIN SODIUM 5000 UNITS: 5000 INJECTION INTRAVENOUS; SUBCUTANEOUS at 21:25

## 2023-05-11 RX ADMIN — LABETALOL HYDROCHLORIDE 20 MG: 5 INJECTION, SOLUTION INTRAVENOUS at 05:01

## 2023-05-11 RX ADMIN — LIDOCAINE 5% 1 PATCH: 700 PATCH TOPICAL at 09:11

## 2023-05-11 RX ADMIN — METOPROLOL TARTRATE 25 MG: 25 TABLET, FILM COATED ORAL at 21:28

## 2023-05-11 RX ADMIN — SENNOSIDES 8.6 MG: 8.6 TABLET, FILM COATED ORAL at 21:25

## 2023-05-11 RX ADMIN — ACETAZOLAMIDE 500 MG: 250 TABLET ORAL at 21:25

## 2023-05-11 RX ADMIN — HEPARIN SODIUM 5000 UNITS: 5000 INJECTION INTRAVENOUS; SUBCUTANEOUS at 05:03

## 2023-05-11 RX ADMIN — METHOCARBAMOL TABLETS 500 MG: 500 TABLET, COATED ORAL at 05:03

## 2023-05-11 RX ADMIN — LACOSAMIDE 100 MG: 100 TABLET, FILM COATED ORAL at 21:25

## 2023-05-11 RX ADMIN — CHLORHEXIDINE GLUCONATE 0.12% ORAL RINSE 15 ML: 1.2 LIQUID ORAL at 21:25

## 2023-05-11 RX ADMIN — LACOSAMIDE 100 MG: 100 TABLET, FILM COATED ORAL at 09:10

## 2023-05-11 RX ADMIN — ACETAZOLAMIDE 500 MG: 250 TABLET ORAL at 09:10

## 2023-05-11 RX ADMIN — DOCUSATE SODIUM 100 MG: 100 CAPSULE, LIQUID FILLED ORAL at 09:10

## 2023-05-11 RX ADMIN — LEVALBUTEROL HYDROCHLORIDE 1.25 MG: 1.25 SOLUTION RESPIRATORY (INHALATION) at 07:43

## 2023-05-11 RX ADMIN — CHLORHEXIDINE GLUCONATE 0.12% ORAL RINSE 15 ML: 1.2 LIQUID ORAL at 09:10

## 2023-05-11 RX ADMIN — METHOCARBAMOL TABLETS 500 MG: 500 TABLET, COATED ORAL at 14:40

## 2023-05-11 RX ADMIN — LEVALBUTEROL HYDROCHLORIDE 1.25 MG: 1.25 SOLUTION RESPIRATORY (INHALATION) at 14:01

## 2023-05-11 RX ADMIN — PANTOPRAZOLE SODIUM 40 MG: 40 TABLET, DELAYED RELEASE ORAL at 21:25

## 2023-05-11 RX ADMIN — LEVALBUTEROL HYDROCHLORIDE 1.25 MG: 1.25 SOLUTION RESPIRATORY (INHALATION) at 19:28

## 2023-05-11 RX ADMIN — METOPROLOL TARTRATE 25 MG: 25 TABLET, FILM COATED ORAL at 09:10

## 2023-05-11 RX ADMIN — HEPARIN SODIUM 5000 UNITS: 5000 INJECTION INTRAVENOUS; SUBCUTANEOUS at 14:40

## 2023-05-11 RX ADMIN — METHOCARBAMOL TABLETS 500 MG: 500 TABLET, COATED ORAL at 21:25

## 2023-05-11 RX ADMIN — PRAVASTATIN SODIUM 80 MG: 80 TABLET ORAL at 17:57

## 2023-05-11 NOTE — PROGRESS NOTES
Progress Note - Geriatric Medicine   Drake Layton II 72 y o  male MRN: [id number]  Unit/Bed#: S -01 Encounter: [id number]      Assessment/Plan:  1 -Multiple pancreatic cystic lesions most of which communicate with a dorsal pancreatic duct  -  Report of MRI was reviewed with his wife in detail  Given the history that his mother had pancreatic cancer this is of greater concern  Gastroenterology has seen the patient they were consulted for endoscopic ultrasound  We will also order a serum carbohydrate antigen 19-9  Patient had recent onset of diabetes which in essence has resolved  This is also sometimes of concern as somebody develops diabetes suddenly one has to be concerned about an underlying neoplasm  2 -  Large left perinephric hematoma encasing the left kidney    3 -  Elevated total bilirubin of 6 32-elevated alkaline phosphatase of 225 -I will order alkaline phosphatase isoenzymes to determine source of elevated number  4 -  Chronic renal insufficiency stage II -  Patient's GFR is 62 it has improved since May 7     5 -  Hearing loss    6 -  Paroxysmal atrial fibrillation    7 -  Parkinsonism -  Patient follows with [facility] neurology Sinemet dosage was changed will ask wife to bring medications    8 -  Large left pleural effusion    9 -  Large left pleural effusion    Recommendations    1 -We will order CA 19-9    2 -  Alkaline phosphatase isoenzymes      Subjective:   Patient's appetite is poor, he was able today to walk in the hallway with physical therapy, has abdominal discomfort at times  Usually localized in mid abdomen  Review of Systems   Constitutional: Negative  HENT: Positive for hearing loss  Eyes: Negative  Respiratory: Negative  Gastrointestinal: Positive for abdominal distention  Endocrine: Negative  Genitourinary: Negative  Musculoskeletal: Negative  Skin: Positive for color change          Jaundiced         Objective:     Vitals: Blood pressure (!) "171/93, pulse 90, temperature 98 °F (36 7 °C), resp  rate 16, height 5' 10\" (1 778 m), weight 98 5 kg (217 lb 2 5 oz), SpO2 94 %  ,Body mass index is 31 16 kg/m²  Intake/Output Summary (Last 24 hours) at 5/11/2023 2094  Last data filed at 5/11/2023 0458  Gross per 24 hour   Intake --   Output 1400 ml   Net -1400 ml       Current Medications: Reviewed    Physical Exam:   Physical Exam  Cardiovascular:      Rate and Rhythm: Normal rate  Rhythm irregular  Heart sounds: Normal heart sounds  Pulmonary:      Breath sounds: Normal breath sounds  Comments: Decreased breath sounds in bases  Abdominal:      General: There is distension  Musculoskeletal:      Cervical back: Neck supple  Skin:     General: Skin is dry  Comments: Patient appears jaundiced   Neurological:      Mental Status: He is alert  Psychiatric:         Mood and Affect: Mood normal           Invasive Devices     Peripheral Intravenous Line  Duration           Peripheral IV 05/08/23 Distal;Dorsal (posterior); Right Forearm 2 days          Drain  Duration           External Urinary Catheter 2 days                Lab, Imaging and other studies: I have personally reviewed pertinent reports      "

## 2023-05-11 NOTE — PLAN OF CARE
Problem: PHYSICAL THERAPY ADULT  Goal: Performs mobility at highest level of function for planned discharge setting  See evaluation for individualized goals  Description: Treatment/Interventions: Functional transfer training, LE strengthening/ROM, Therapeutic exercise, Cognitive reorientation, Patient/family training, Equipment eval/education, Bed mobility, Gait training, Spoke to nursing, Spoke to case management  Equipment Recommended: Leana Doty       See flowsheet documentation for full assessment, interventions and recommendations  Outcome: Progressing  Note: Prognosis: Fair  Problem List: Decreased strength, Decreased endurance, Impaired balance, Decreased mobility, Pain  Assessment: pt began tx session lying supine in the bed and was agreeable to participate in PT intervention  progression with bed mobility in todays tx session as pt completed a supine<>sit EOB transfer with a decrease in level of assistance required /s with VC's for use of bed rail to assist with transfer  pt cotninues to remain consistant with requiring min Ax1 for all functional transfesr to and from rw with VC's for hand placement while ascending to RW and descending to EOB  pt was able to increase ambulation distance compared to previous tx sessions but continues to remain limited with activity tolerance and ambulation distance as additional distance was not possible after 130'x1 RW min Ax1  pt educated on all safe stair trials prior to initiate steps  pt completed 4 steps with bilateral hand rails and min Ax1 for safety and balance  pt was able to participate in TE activities w/o increases in pain   Continue to recommend post acute rehab services at [facility] time of D/C in order to maximize pt functional independence and safety with all OOB mobility  Barriers to Discharge: Inaccessible home environment  Barriers to Discharge Comments: 7+7 JESICA  PT Discharge Recommendation: Post acute rehabilitation services    See flowsheet documentation for full assessment

## 2023-05-11 NOTE — ASSESSMENT & PLAN NOTE
Lab Results   Component Value Date    HGB 10 8 (L) 05/11/2023    HGB 9 4 (L) 05/10/2023    HGB 8 6 (L) 05/09/2023    HGB 8 3 (L) 05/08/2023       · 2/2 kidney capsule rupture in the setting of coagulopathy on home Xarelto and ASA in route to ER  · Reversed with KCentra and DDAVP  · Total Blood products to date  · PRBCs 4 units  · FFP 4 units  · Transfuse for HGB < 7 or hemodynamic instability  · H/H is stable  No signs of ongoing bleeding

## 2023-05-11 NOTE — PLAN OF CARE
"  Problem: OCCUPATIONAL THERAPY ADULT  Goal: Performs self-care activities at highest level of function for planned discharge setting  See evaluation for individualized goals  Description: Treatment Interventions: ADL retraining, Functional transfer training, UE strengthening/ROM, Endurance training, Patient/family training, Equipment evaluation/education, Fine motor coordination activities, Compensatory technique education, Energy conservation          See flowsheet documentation for full assessment, interventions and recommendations  Outcome: Progressing  Note: Limitation: Decreased ADL status, Decreased endurance, Decreased high-level ADLs, Decreased self-care trans (dec'd IADL, dec'd functional reach, dec'd standing tolearnce, generalized deconditioning,)  Prognosis: Good  Assessment: Pt seen on this date for skilled OT treatment session  At start of session pt supine in bed  Pt agreeable and motivated to participate in session stating \"I really just want to take a shower\"  Pt required increased time for bed mobility and functional mobility  Able to complete functional mobility into bathroom to complete toileting and showering  Pt requiring A with LB tasks due to limited functional reach, and encouragement to remain seated during showering due to fatigue  Pt noted with elevated WOB, although O2 sats remain WFL  Pt edu on energy conservation and pacing of tasks  Sitting in chair outside shower for dressing with incereased time for rest breaks  Pt reports extreme fatigue s/p shower and requesting rest before participation in PT session with SHAREE Blount  Pt noted to make progress with overall activity tolerance and functional ADL status  Pt would continue to benefit from skilled OT treatment sessions in order to address remaining deficits and continue to recommend d/c to rehab when medically stable       OT Discharge Recommendation: Post acute rehabilitation services          "

## 2023-05-11 NOTE — OCCUPATIONAL THERAPY NOTE
Occupational Therapy Progress Note     Patient Name: Pablo Griffiths  [id number] Date: 5/11/2023  Problem List  Principal Problem:    Kidney capsule rupture, left, initial encounter  Active Problems:    Type 2 diabetes mellitus (HCC)    CAD (coronary artery disease)    Seizure disorder (HCC)    Leukemia (HCC)    Splenic hemorrhage    Paroxysmal atrial fibrillation (HCC)    Coagulopathy (HCC)    Acute respiratory insufficiency    Acute blood loss anemia    Hyperbilirubinemia              05/11/23 0801   OT Last Visit   OT Visit Date 05/11/23   Note Type   Note Type Treatment   Pain Assessment   Pain Assessment Tool 0-10   Pain Score No Pain   Restrictions/Precautions   Weight Bearing Precautions Per Order No   Other Precautions Cognitive; Bed Alarm; Chair Alarm; Fall Risk   ADL   Eating Assistance 6  Modified independent   Eating Comments eating breakfast at end of session   Grooming Assistance 5  Supervision/Setup   Grooming Deficit Increased time to complete;Supervision/safety;Verbal cueing   Grooming Comments washing face and hair sitting in shower   UB Bathing Assistance 4  Minimal Assistance   UB Bathing Deficit Supervision/safety; Increased time to complete;Verbal cueing   UB Bathing Comments A with underarms   LB Bathing Assistance 3  Moderate Assistance   LB Bathing Deficit Increased time to complete;Supervision/safety;Verbal cueing;Buttocks;Right lower leg including foot; Left lower leg including foot   LB Bathing Comments A with buttocks in standing   UB Dressing Assistance 5  Supervision/Setup   UB Dressing Deficit Increased time to complete;Supervision/safety;Verbal cueing   LB Dressing Assistance 2  Maximal Assistance   LB Dressing Deficit Don/doff R sock; Don/doff L sock; Thread RLE into pants; Thread LLE into pants;Pull up over hips   LB Dressing Comments A with doff/suraj B socks, and threading pants over feet due to limited forward functional reach   [address] Deficit "Clothing management down;Perineal hygiene;Clothing management up   Bed Mobility   Supine to Sit 4  Minimal assistance   Additional items Assist x 1; Increased time required;Verbal cues;LE management   Transfers   Sit to Stand 4  Minimal assistance   Additional items Assist x 1; Increased time required;Verbal cues   Stand to Sit 4  Minimal assistance   Additional items Assist x 1; Increased time required;Verbal cues   Toilet transfer 4  Minimal assistance   Additional items Assist x 1; Increased time required;Verbal cues;Standard toilet  (grab bar)   Additional Comments use of RW   Functional Mobility   Functional Mobility 4  Minimal assistance   Additional Comments Ax1, bed > bathroom > chair  Seated rest breaks throughout   Additional items Rolling walker   Shower Transfers   Shower Transfer From [employer] Type To and From   Shower Transfer to [email or web address] without back   Shower Transfer Technique Ambulating  (side stepping into shower)   Shower Transfers Minimal assistance   Shower Transfers Comments use of grab bar   Subjective   Subjective \"I just want to take a shower\"   Cognition   Overall Cognitive Status Impaired  (will continue to assess)   Arousal/Participation Alert; Cooperative   Attention Attends with cues to redirect   Orientation Level Oriented X4   Memory Decreased short term memory;Decreased recall of recent events   Following Commands Follows one step commands with increased time or repetition   Comments Pt requiring VC for encouragement to continue to participate   Activity Tolerance   Activity Tolerance Patient limited by fatigue  (O2 >90% throughout session, HR ranging  with activity, BP at start of session 171/93, s/p shower 163/86)   Medical Staff Made Aware PANCHO Park, spoke to SHAREE Blount and ALEXANDRA Coelho   Assessment   Assessment Pt seen on this date for skilled OT treatment session  At start of session pt supine in bed   Pt agreeable and motivated to participate in session stating \"I " "really just want to take a shower\"  Pt required increased time for bed mobility and functional mobility  Able to complete functional mobility into bathroom to complete toileting and showering  Pt requiring A with LB tasks due to limited functional reach, and encouragement to remain seated during showering due to fatigue  Pt noted with elevated WOB, although O2 sats remain WFL  Pt edu on energy conservation and pacing of tasks  Sitting in chair outside shower for dressing with incereased time for rest breaks  Pt reports extreme fatigue s/p shower and requesting rest before participation in PT session with SHAREE Blount  Pt noted to make progress with overall activity tolerance and functional ADL status  Pt would continue to benefit from skilled OT treatment sessions in order to address remaining deficits and continue to recommend d/c to rehab when medically stable  Plan   Goal Expiration Date 05/14/23   OT Treatment Day 2   OT Frequency 3-5x/wk   Recommendation   OT Discharge Recommendation Post acute rehabilitation services   AM-PAC Daily Activity Inpatient   Lower Body Dressing 2   Bathing 3   Toileting 3   Upper Body Dressing 3   Grooming 3   Eating 4   Daily Activity Raw Score 18   Daily Activity Standardized Score (Calc for Raw Score >=11) 38 66   AM-[facility] Applied Cognition Inpatient   Following a Speech/Presentation 4   Understanding Ordinary Conversation 4   Taking Medications 4   Remembering Where Things Are Placed or Put Away 3   Remembering List of 4-5 Errands 3   Taking Care of Complicated Tasks 3   Applied Cognition Raw Score 21   Applied Cognition Standardized Score 44 3   End of Consult   Patient Position at End of Consult Bedside chair;Bed/Chair alarm activated; All needs within reach     The patient's raw score on the AM-PAC Daily Activity Inpatient Short Form is 18  A raw score of less than 19 suggests the patient may benefit from discharge to post-acute rehabilitation services   Please refer to the " recommendation of the Occupational Therapist for safe discharge planning        Lulu Cast MS, OTR/L

## 2023-05-11 NOTE — PLAN OF CARE
Problem: MOBILITY - ADULT  Goal: Maintain or return to baseline ADL function  Description: INTERVENTIONS:  -  Assess patient's ability to carry out ADLs; assess patient's baseline for ADL function and identify physical deficits which impact ability to perform ADLs (bathing, care of mouth/teeth, toileting, grooming, dressing, etc )  - Assess/evaluate cause of self-care deficits   - Assess range of motion  - Assess patient's mobility; develop plan if impaired  - Assess patient's need for assistive devices and provide as appropriate  - Encourage maximum independence but intervene and supervise when necessary  - Involve family in performance of ADLs  - Assess for home care needs following discharge   - Consider OT consult to assist with ADL evaluation and planning for discharge  - Provide patient education as appropriate  Outcome: Progressing  Goal: Maintains/Returns to pre admission functional level  Description: INTERVENTIONS:  - Perform BMAT or MOVE assessment daily    - Set and communicate daily mobility goal to care team and patient/family/caregiver  - Collaborate with rehabilitation services on mobility goals if consulted  - Ambulate patient 3 times a day  - Out of bed for meals 3 times a day  - Out of bed for toileting  - Record patient progress and toleration of activity level   Outcome: Progressing     Problem: Nutrition/Hydration-ADULT  Goal: Nutrient/Hydration intake appropriate for improving, restoring or maintaining nutritional needs  Description: Monitor and assess patient's nutrition/hydration status for malnutrition  Collaborate with interdisciplinary team and initiate plan and interventions as ordered  Monitor patient's weight and dietary intake as ordered or per policy  Utilize nutrition screening tool and intervene as necessary  Determine patient's food preferences and provide high-protein, high-caloric foods as appropriate       INTERVENTIONS:  - Monitor oral intake, urinary output, labs, and treatment plans  - Assess nutrition and hydration status and recommend course of action  - Evaluate amount of meals eaten  - Assist patient with eating if necessary   - Allow adequate time for meals  - Recommend/ encourage appropriate diets, oral nutritional supplements, and vitamin/mineral supplements  - Order, calculate, and assess calorie counts as needed  - Recommend, monitor, and adjust tube feedings and TPN/PPN based on assessed needs  - Assess need for intravenous fluids  - Provide specific nutrition/hydration education as appropriate  - Include patient/family/caregiver in decisions related to nutrition  Outcome: Progressing     Problem: Prexisting or High Potential for Compromised Skin Integrity  Goal: Skin integrity is maintained or improved  Description: INTERVENTIONS:  - Identify patients at risk for skin breakdown  - Assess and monitor skin integrity  - Assess and monitor nutrition and hydration status  - Monitor labs   - Assess for incontinence   - Turn and reposition patient  - Assist with mobility/ambulation  - Relieve pressure over bony prominences  - Avoid friction and shearing  - Provide appropriate hygiene as needed including keeping skin clean and dry  - Evaluate need for skin moisturizer/barrier cream  - Collaborate with interdisciplinary team   - Patient/family teaching  - Consider wound care consult   Outcome: Progressing     Problem: SAFETY,RESTRAINT: NV/NON-SELF DESTRUCTIVE BEHAVIOR  Goal: Remains free of harm/injury (restraint for non violent/non self-detsructive behavior)  Description: INTERVENTIONS:  - Instruct patient/family regarding restraint use   - Assess and monitor physiologic and psychological status   - Provide interventions and comfort measures to meet assessed patient needs   - Identify and implement measures to help patient regain control  - Assess readiness for release of restraint   Outcome: Progressing  Goal: Returns to optimal restraint-free functioning  Description: INTERVENTIONS:  - Assess the patient's behavior and symptoms that indicate continued need for restraint  - Identify and implement measures to help patient regain control  - Assess readiness for release of restraint   Outcome: Progressing

## 2023-05-11 NOTE — PHYSICAL THERAPY NOTE
PHYSICAL THERAPY NOTE          Patient Name: Luisito Brown Date: 5/11/2023 05/11/23 1420   PT Last Visit   PT Visit Date 05/11/23   Note Type   Note Type Treatment   Pain Assessment   Pain Assessment Tool 0-10   Pain Score No Pain   Pain Onset/Description Onset: Ongoing   Effect of Pain on Daily Activities limited ambulation distance   Patient's Stated Pain Goal No pain   Hospital Pain Intervention(s) Repositioned; Ambulation/increased activity; Rest   Multiple Pain Sites No   Pain Rating: FLACC (Rest) - Face 0   Pain Rating: FLACC (Rest) - Legs 0   Pain Rating: FLACC (Rest) - Activity 0   Pain Rating: FLACC (Rest) - Cry 0   Pain Rating: FLACC (Rest) - Consolability 0   Score: FLACC (Rest) 0   Pain Rating: FLACC (Activity) - Face 0   Pain Rating: FLACC (Activity) - Legs 0   Pain Rating: FLACC (Activity) - Activity 0   Pain Rating: FLACC (Activity) - Cry 0   Pain Rating: FLACC (Activity) - Consolability 0   Score: FLACC (Activity) 0   Restrictions/Precautions   Weight Bearing Precautions Per Order No   Other Precautions Chair Alarm;Cognitive; Fall Risk;Pain   General   Chart Reviewed Yes   Response to Previous Treatment Patient with no complaints from previous session  Family/Caregiver Present No   Cognition   Overall Cognitive Status Impaired   Arousal/Participation Alert; Responsive; Cooperative   Attention Attends with cues to redirect   Orientation Level Oriented X4   Memory Decreased short term memory;Decreased recall of recent events   Following Commands Follows one step commands with increased time or repetition   Comments pt was pleasant and cooperetiave throughout tx session   Subjective   Subjective pt was agreeable to participate in PT intervention   Bed Mobility   Supine to Sit 5  Supervision   Additional items Assist x 1;HOB elevated; Increased time required;Verbal cues   Sit to Supine Unable to assess Additional Comments ptseated EOB post tx session with respiratory therapist   Transfers   Sit to Stand 4  Minimal assistance   Additional items Assist x 1; Increased time required;Verbal cues  (w/ RW)   Stand to Sit 4  Minimal assistance   Additional items Assist x 1; Armrests; Increased time required;Verbal cues  (w/ RW)   Stand pivot 4  Minimal assistance   Additional items Assist x 1; Armrests; Increased time required;Verbal cues  (w/ RW)   Additional Comments pt continues to require RW and min Ax1 for all functional transfers in todays tx session for increased safety and balance   Ambulation/Elevation   Gait pattern Decreased foot clearance; Short stride; Foward flexed   Gait Assistance 4  Minimal assist   Additional items Assist x 1;Verbal cues   Assistive Device Rolling walker   Distance 130'x1 RW  (additional not possible due to fatigue)   Stair Management Assistance 4  Minimal assist   Additional items Assist x 1;Verbal cues; Tactile cues; Increased time required   Stair Management Technique Two rails; Step to pattern; Foreward;Backward   Number of Stairs 4   Balance   Static Sitting Fair +   Dynamic Sitting Fair   Static Standing Poor +   Dynamic Standing Poor +   Ambulatory Poor +   Endurance Deficit   Endurance Deficit Yes   Endurance Deficit Description limited activity tolerance and ambulation distance   Activity Tolerance   Activity Tolerance Patient limited by fatigue   Nurse Made Aware Spoke to RN   Exercises   Hip Abduction Sitting;10 reps;AROM; Bilateral   Hip Adduction Sitting;10 reps;AROM; Bilateral  (pillow squeezes)   Knee AROM Long Arc Quad Sitting;10 reps;AROM; Bilateral   Ankle Pumps Sitting;20 reps;AROM; Bilateral   Marching Sitting;10 reps;AROM; Bilateral   Assessment   Prognosis Fair   Problem List Decreased strength;Decreased endurance; Impaired balance;Decreased mobility;Pain   Assessment pt began tx session lying supine in the bed and was agreeable to participate in PT intervention   progression with bed mobility in todays tx session as pt completed a supine<>sit EOB transfer with a decrease in level of assistance required /s with VC's for use of bed rail to assist with transfer  pt cotninues to remain consistant with requiring min Ax1 for all functional transfesr to and from rw with VC's for hand placement while ascending to RW and descending to EOB  pt was able to increase ambulation distance compared to previous tx sessions but continues to remain limited with activity tolerance and ambulation distance as additional distance was not possible after 130'x1 RW min Ax1  pt educated on all safe stair trials prior to initiate steps  pt completed 4 steps with bilateral hand rails and min Ax1 for safety and balance  pt was able to participate in TE activities w/o increases in pain  Continue to recommend post acute rehab services at [facility] time of D/C in order to maximize pt functional independence and safety with all OOB mobility   Barriers to Discharge Inaccessible home environment   Barriers to Discharge Comments 7+7 [place]   Goals   Patient Goals none stated this tx session   STG Expiration Date 05/14/23   PT Treatment Day 4   Plan   Treatment/Interventions Functional transfer training;LE strengthening/ROM; Elevations; Therapeutic exercise; Endurance training;Patient/family training;Equipment eval/education; Bed mobility;Gait training;Spoke to nursing   Progress Slow progress, decreased activity tolerance   PT Frequency 3-5x/wk   Recommendation   PT Discharge Recommendation Post acute rehabilitation services   Equipment Recommended [address] [facility] Recommended Wheeled walker   AM-PAC Basic Mobility Inpatient   Turning in Flat Bed Without Bedrails 3   Lying on Back to Sitting on Edge of Flat Bed Without Bedrails 3   Moving Bed to Chair 3   Standing Up From Chair Using Arms 3   Walk in Room 3   Climb 3-5 Stairs With Railing 3   Basic Mobility Inpatient Raw Score 18   Basic Mobility Standardized Score 41 05   Highest Level Of Mobility   -[facility] Goal 6: Walk 10 steps or more   Education   Education Provided Mobility training;Assistive device  (stair trials)   Patient Demonstrates acceptance/verbal understanding   End of Consult   Patient Position at End of Consult Seated edge of bed;Bed/Chair alarm activated; All needs within reach   The patient's AM-PAC Basic Mobility Inpatient Short Form Raw Score is 18  A Raw score of greater than 16 suggests the patient may benefit from discharge to home  Please also refer to the recommendation of the Physical Therapist for safe discharge planning       Pt continues to be limited with activity tolerance and ambulation distance and would benefit from continued skilled PT intervention in order to maximize pt functional independence and safety with all OOB Mobility      [employer]

## 2023-05-12 ENCOUNTER — HOME HEALTH ADMISSION (OUTPATIENT)
Dept: HOME HEALTH SERVICES | Facility: HOME HEALTHCARE | Age: 73
End: 2023-05-12

## 2023-05-12 LAB
ALBUMIN SERPL BCP-MCNC: 3.4 G/DL (ref 3.5–5)
ALP SERPL-CCNC: 202 U/L (ref 34–104)
ALT SERPL W P-5'-P-CCNC: 49 U/L (ref 7–52)
ANION GAP SERPL CALCULATED.3IONS-SCNC: 6 MMOL/L (ref 4–13)
AST SERPL W P-5'-P-CCNC: 43 U/L (ref 13–39)
BASOPHILS # BLD MANUAL: 0 THOUSAND/UL (ref 0–0.1)
BASOPHILS NFR MAR MANUAL: 0 % (ref 0–1)
BILIRUB DIRECT SERPL-MCNC: 2.55 MG/DL (ref 0–0.2)
BILIRUB SERPL-MCNC: 4.9 MG/DL (ref 0.2–1)
BUN SERPL-MCNC: 19 MG/DL (ref 5–25)
CALCIUM ALBUM COR SERPL-MCNC: 8.7 MG/DL (ref 8.3–10.1)
CALCIUM SERPL-MCNC: 8.2 MG/DL (ref 8.4–10.2)
CHLORIDE SERPL-SCNC: 107 MMOL/L (ref 96–108)
CO2 SERPL-SCNC: 21 MMOL/L (ref 21–32)
CREAT SERPL-MCNC: 1.25 MG/DL (ref 0.6–1.3)
EOSINOPHIL # BLD MANUAL: 0 THOUSAND/UL (ref 0–0.4)
EOSINOPHIL NFR BLD MANUAL: 0 % (ref 0–6)
ERYTHROCYTE [DISTWIDTH] IN BLOOD BY AUTOMATED COUNT: 14.7 % (ref 11.6–15.1)
GFR SERPL CREATININE-BSD FRML MDRD: 57 ML/MIN/1.73SQ M
GLUCOSE SERPL-MCNC: 119 MG/DL (ref 65–140)
GLUCOSE SERPL-MCNC: 132 MG/DL (ref 65–140)
GLUCOSE SERPL-MCNC: 139 MG/DL (ref 65–140)
GLUCOSE SERPL-MCNC: 151 MG/DL (ref 65–140)
GLUCOSE SERPL-MCNC: 205 MG/DL (ref 65–140)
HCT VFR BLD AUTO: 33.6 % (ref 36.5–49.3)
HGB BLD-MCNC: 10.7 G/DL (ref 12–17)
LYMPHOCYTES # BLD AUTO: 1.59 THOUSAND/UL (ref 0.6–4.47)
LYMPHOCYTES # BLD AUTO: 10 % (ref 14–44)
MCH RBC QN AUTO: 31.8 PG (ref 26.8–34.3)
MCHC RBC AUTO-ENTMCNC: 31.8 G/DL (ref 31.4–37.4)
MCV RBC AUTO: 100 FL (ref 82–98)
MONOCYTES # BLD AUTO: 0.79 THOUSAND/UL (ref 0–1.22)
MONOCYTES NFR BLD: 5 % (ref 4–12)
NEUTROPHILS # BLD MANUAL: 13.49 THOUSAND/UL (ref 1.85–7.62)
NEUTS SEG NFR BLD AUTO: 85 % (ref 43–75)
PLATELET # BLD AUTO: 117 THOUSANDS/UL (ref 149–390)
PLATELET BLD QL SMEAR: ABNORMAL
PMV BLD AUTO: 10.4 FL (ref 8.9–12.7)
POTASSIUM SERPL-SCNC: 4.2 MMOL/L (ref 3.5–5.3)
PROT SERPL-MCNC: 5.7 G/DL (ref 6.4–8.4)
RBC # BLD AUTO: 3.36 MILLION/UL (ref 3.88–5.62)
RBC MORPH BLD: NORMAL
SODIUM SERPL-SCNC: 134 MMOL/L (ref 135–147)
WBC # BLD AUTO: 15.87 THOUSAND/UL (ref 4.31–10.16)

## 2023-05-12 RX ADMIN — DOCUSATE SODIUM 100 MG: 100 CAPSULE, LIQUID FILLED ORAL at 08:21

## 2023-05-12 RX ADMIN — EZETIMIBE 10 MG: 10 TABLET ORAL at 17:00

## 2023-05-12 RX ADMIN — ACETAZOLAMIDE 500 MG: 250 TABLET ORAL at 21:17

## 2023-05-12 RX ADMIN — INSULIN LISPRO 1 UNITS: 100 INJECTION, SOLUTION INTRAVENOUS; SUBCUTANEOUS at 21:18

## 2023-05-12 RX ADMIN — LACOSAMIDE 100 MG: 100 TABLET, FILM COATED ORAL at 08:21

## 2023-05-12 RX ADMIN — HEPARIN SODIUM 5000 UNITS: 5000 INJECTION INTRAVENOUS; SUBCUTANEOUS at 14:32

## 2023-05-12 RX ADMIN — METHOCARBAMOL TABLETS 500 MG: 500 TABLET, COATED ORAL at 14:32

## 2023-05-12 RX ADMIN — METOPROLOL TARTRATE 25 MG: 25 TABLET, FILM COATED ORAL at 21:16

## 2023-05-12 RX ADMIN — PRAVASTATIN SODIUM 80 MG: 80 TABLET ORAL at 17:00

## 2023-05-12 RX ADMIN — HEPARIN SODIUM 5000 UNITS: 5000 INJECTION INTRAVENOUS; SUBCUTANEOUS at 21:18

## 2023-05-12 RX ADMIN — LACOSAMIDE 100 MG: 100 TABLET, FILM COATED ORAL at 21:17

## 2023-05-12 RX ADMIN — METOPROLOL TARTRATE 25 MG: 25 TABLET, FILM COATED ORAL at 08:20

## 2023-05-12 RX ADMIN — CARBIDOPA AND LEVODOPA 1.5 TABLET: 25; 100 TABLET ORAL at 17:00

## 2023-05-12 RX ADMIN — HEPARIN SODIUM 5000 UNITS: 5000 INJECTION INTRAVENOUS; SUBCUTANEOUS at 05:03

## 2023-05-12 RX ADMIN — LEVALBUTEROL HYDROCHLORIDE 1.25 MG: 1.25 SOLUTION RESPIRATORY (INHALATION) at 08:35

## 2023-05-12 RX ADMIN — CARBIDOPA AND LEVODOPA 1.5 TABLET: 25; 100 TABLET ORAL at 21:16

## 2023-05-12 RX ADMIN — INSULIN LISPRO 2 UNITS: 100 INJECTION, SOLUTION INTRAVENOUS; SUBCUTANEOUS at 12:28

## 2023-05-12 RX ADMIN — LEVALBUTEROL HYDROCHLORIDE 1.25 MG: 1.25 SOLUTION RESPIRATORY (INHALATION) at 13:11

## 2023-05-12 RX ADMIN — PANTOPRAZOLE SODIUM 40 MG: 40 TABLET, DELAYED RELEASE ORAL at 08:20

## 2023-05-12 RX ADMIN — PANTOPRAZOLE SODIUM 40 MG: 40 TABLET, DELAYED RELEASE ORAL at 21:17

## 2023-05-12 RX ADMIN — METHOCARBAMOL TABLETS 500 MG: 500 TABLET, COATED ORAL at 21:18

## 2023-05-12 RX ADMIN — METHOCARBAMOL TABLETS 500 MG: 500 TABLET, COATED ORAL at 05:03

## 2023-05-12 RX ADMIN — ACETAZOLAMIDE 500 MG: 250 TABLET ORAL at 08:19

## 2023-05-12 RX ADMIN — DOCUSATE SODIUM 100 MG: 100 CAPSULE, LIQUID FILLED ORAL at 17:00

## 2023-05-12 NOTE — PLAN OF CARE
Problem: MOBILITY - ADULT  Goal: Maintain or return to baseline ADL function  Description: INTERVENTIONS:  -  Assess patient's ability to carry out ADLs; assess patient's baseline for ADL function and identify physical deficits which impact ability to perform ADLs (bathing, care of mouth/teeth, toileting, grooming, dressing, etc )  - Assess/evaluate cause of self-care deficits   - Assess range of motion  - Assess patient's mobility; develop plan if impaired  - Assess patient's need for assistive devices and provide as appropriate  - Encourage maximum independence but intervene and supervise when necessary  - Involve family in performance of ADLs  - Assess for home care needs following discharge   - Consider OT consult to assist with ADL evaluation and planning for discharge  - Provide patient education as appropriate  Outcome: Progressing  Goal: Maintains/Returns to pre admission functional level  Description: INTERVENTIONS:  - Perform BMAT or MOVE assessment daily    - Set and communicate daily mobility goal to care team and patient/family/caregiver  - Collaborate with rehabilitation services on mobility goals if consulted  - Perform Range of Motion *** times a day  - Reposition patient every *** hours  - Dangle patient *** times a day  - Stand patient *** times a day  - Ambulate patient *** times a day  - Out of bed to chair *** times a day   - Out of bed for meals *** times a day  - Out of bed for toileting  - Record patient progress and toleration of activity level   Outcome: Progressing     Problem: Nutrition/Hydration-ADULT  Goal: Nutrient/Hydration intake appropriate for improving, restoring or maintaining nutritional needs  Description: Monitor and assess patient's nutrition/hydration status for malnutrition  Collaborate with interdisciplinary team and initiate plan and interventions as ordered  Monitor patient's weight and dietary intake as ordered or per policy   Utilize nutrition screening tool and intervene as necessary  Determine patient's food preferences and provide high-protein, high-caloric foods as appropriate       INTERVENTIONS:  - Monitor oral intake, urinary output, labs, and treatment plans  - Assess nutrition and hydration status and recommend course of action  - Evaluate amount of meals eaten  - Assist patient with eating if necessary   - Allow adequate time for meals  - Recommend/ encourage appropriate diets, oral nutritional supplements, and vitamin/mineral supplements  - Order, calculate, and assess calorie counts as needed  - Recommend, monitor, and adjust tube feedings and TPN/PPN based on assessed needs  - Assess need for intravenous fluids  - Provide specific nutrition/hydration education as appropriate  - Include patient/family/caregiver in decisions related to nutrition  Outcome: Progressing     Problem: Prexisting or High Potential for Compromised Skin Integrity  Goal: Skin integrity is maintained or improved  Description: INTERVENTIONS:  - Identify patients at risk for skin breakdown  - Assess and monitor skin integrity  - Assess and monitor nutrition and hydration status  - Monitor labs   - Assess for incontinence   - Turn and reposition patient  - Assist with mobility/ambulation  - Relieve pressure over bony prominences  - Avoid friction and shearing  - Provide appropriate hygiene as needed including keeping skin clean and dry  - Evaluate need for skin moisturizer/barrier cream  - Collaborate with interdisciplinary team   - Patient/family teaching  - Consider wound care consult   Outcome: Progressing     Problem: SAFETY,RESTRAINT: NV/NON-SELF DESTRUCTIVE BEHAVIOR  Goal: Remains free of harm/injury (restraint for non violent/non self-detsructive behavior)  Description: INTERVENTIONS:  - Instruct patient/family regarding restraint use   - Assess and monitor physiologic and psychological status   - Provide interventions and comfort measures to meet assessed patient needs   - Identify and implement measures to help patient regain control  - Assess readiness for release of restraint   Outcome: Progressing  Goal: Returns to optimal restraint-free functioning  Description: INTERVENTIONS:  - Assess the patient's behavior and symptoms that indicate continued need for restraint  - Identify and implement measures to help patient regain control  - Assess readiness for release of restraint   Outcome: Progressing

## 2023-05-12 NOTE — ASSESSMENT & PLAN NOTE
-noted 5/8, overall stable with T  Bili of 6, largely direct, with transaminitis  -no history of hepatitis, cirrhosis, alcohol use, or LFT abnormalities  -denies RUQ abdominal pain  -RUQ US with cholelithiasis, no evidence of acute cholecystitis  -GGT elevated, 171  -lipase, ammonia WNL  - MRCP shows pancreatic cystic mass  GI recommending outpatient EGD

## 2023-05-12 NOTE — ASSESSMENT & PLAN NOTE
Lab Results   Component Value Date    HGBA1C 5 9 (H) 04/20/2023       Recent Labs     05/11/23  0738 05/11/23  1059 05/11/23  1601 05/11/23 2053   POCGLU 179* 149* 130 138       Blood Sugar Average: Last 72 hrs:  - Hold home medications   - Start sliding scale insulin with accuchecks

## 2023-05-12 NOTE — PROGRESS NOTES
"Progress Note - Geriatric Medicine   Tameka Chowdhury II 72 y o  male MRN: [id number]  Unit/Bed#: S -01 Encounter: [id number]      Assessment/Plan:  1  Hyperbilirubinemia with elevated alkaline phosphatase: MRCP with no choledocholithiasis or biliary dilatation  Direct bilirubin trending downwards  We will follow-up on alkaline phosphatase is advised enzyme  2   Leukocytosis: Predminantly neutrophilic  He remains non-febrile and nontoxic-appearing  Etiology at this time remains unclear  Continue to monitor off antibiotics  3   Multiple pancreatic cystic lesions: Identified on MRCP  Recommended to follow-up with gastroenterology as outpatient  4   Nontraumatic left kidney capsular rupture - Stable    5  Hemorrhagic shock -now resolved    6  Anemia: Hb of 10 7  It has remained stable following acute blood loss anemia in setting of spontaneous nontraumatic left capsular rupture  7   Bilateral pleural effusion    8  Parkinsonism: Discussed with sister at bedside regarding providing outpatient meds/dose    9  Hearing loss         Subjective:   Mr Garth Stahl is seen and examined at bedside  Sister was at bedside  He offers no complaints at this time  He is anxious to be discharged home  Fortunately, serum bilirubin and alkaline phosphatase has been trending downwards  Review of Systems   Constitutional: Negative for activity change and appetite change  Respiratory: Negative for cough and chest tightness  Cardiovascular: Negative for chest pain and palpitations  Gastrointestinal: Negative for abdominal distention, diarrhea and vomiting  Genitourinary: Negative for difficulty urinating, dysuria and flank pain  Musculoskeletal: Negative for arthralgias, gait problem and myalgias  Neurological: Negative for dizziness, seizures and numbness  Objective:     Vitals: Blood pressure 164/95, pulse 95, temperature 98 °F (36 7 °C), resp   rate 18, height 5' 10\" (1 778 m), weight 98 1 kg (216 " lb 4 3 oz), SpO2 96 %  ,Body mass index is 31 03 kg/m²  Intake/Output Summary (Last 24 hours) at 5/12/2023 9667  Last data filed at 5/11/2023 2219  Gross per 24 hour   Intake --   Output 300 ml   Net -300 ml       Current Medications: Reviewed    Physical Exam:   Physical Exam  Vitals and nursing note reviewed  Constitutional:       General: He is not in acute distress  Appearance: He is well-developed  He is not toxic-appearing  HENT:      Head: Normocephalic and atraumatic  Eyes:      Conjunctiva/sclera: Conjunctivae normal    Cardiovascular:      Rate and Rhythm: Normal rate and regular rhythm  Pulses: Normal pulses  Heart sounds: No murmur heard  Pulmonary:      Effort: Pulmonary effort is normal  No respiratory distress  Breath sounds: Normal breath sounds  Abdominal:      Palpations: Abdomen is soft  Tenderness: There is no abdominal tenderness  Musculoskeletal:         General: No swelling  Cervical back: Neck supple  Skin:     General: Skin is warm and dry  Capillary Refill: Capillary refill takes less than 2 seconds  Coloration: Skin is jaundiced  Findings: No bruising  Neurological:      Mental Status: He is alert  Psychiatric:         Mood and Affect: Mood normal           Invasive Devices     Peripheral Intravenous Line  Duration           Peripheral IV 05/08/23 Distal;Dorsal (posterior); Right Forearm 3 days                Lab, Imaging and other studies: I have personally reviewed pertinent reports

## 2023-05-12 NOTE — PROGRESS NOTES
"Progress Note - Ngoc Metcalf II 72 y o  male MRN: [id number]    Unit/Bed#: S -01 Encounter: [id number]      Assessment:  S/P SPontaneous rupture of kidney  Type 2 DM  CAD  Leukemia  Spleenic hemorrhage  Acute Renal insufficiency  A-fib  ABLA    Plan:  Admitted to Trauma  Call to wife to update on his status\  Pain controlled  DVT prophylaxis  Awaiting rehab    Subjective:   \" I'm okay today\"    Objective:     Vitals: Blood pressure 151/85, pulse 99, temperature (!) 97 1 °F (36 2 °C), resp  rate 16, height 5' 10\" (1 778 m), weight 98 1 kg (216 lb 4 3 oz), SpO2 95 %  ,Body mass index is 31 03 kg/m²  Intake/Output Summary (Last 24 hours) at 5/12/2023 1621  Last data filed at 5/12/2023 0800  Gross per 24 hour   Intake --   Output 800 ml   Net -800 ml       Physical Exam: /85   Pulse 99   Temp (!) 97 1 °F (36 2 °C)   Resp 16   Ht 5' 10\" (1 778 m)   Wt 98 1 kg (216 lb 4 3 oz)   SpO2 95%   BMI 31 03 kg/m²     General Appearance:    Alert, cooperative, no distress, appears stated age   Head:    Normocephalic, without obvious abnormality, atraumatic   Eyes:    PERRL, conjunctiva/corneas clear, EOM's intact, fundi     benign, both eyes        Ears:    Normal TM's and external ear canals, both ears   Nose:   Nares normal, septum midline, mucosa normal, no drainage    or sinus tenderness   Throat:   Lips, mucosa, and tongue normal; teeth and gums normal   Neck:   Supple, symmetrical, trachea midline, no adenopathy; Thyroid  Back:     Symmetric, no curvature, ROM normal, no CVA tenderness   Lungs:     Clear to auscultation bilaterally, respirations unlabored   Chest wall:    No tenderness or deformity   Heart:    Regular rate and rhythm, S1 and S2 normal, no murmur, rub   or gallop   Abdomen:     Soft, non-tender, bowel sounds active all four quadrants     Genitalia:    Normal male without lesion, discharge or tenderness   Rectal:    Normal tone, normal prostate, no masses or tenderness;    guaiac " negative stool   Extremities:   Extremities normal, atraumatic, no cyanosis or edema   Pulses:   2+ and symmetric all extremities   Skin:   Skin color, texture, turgor normal, no rashes or lesions   Lymph nodes:   Cervical, supraclavicular, and axillary nodes normal   Neurologic:   CNII-XII intact  Normal strength, sensation and reflexes       Throughout  Invasive Devices     Peripheral Intravenous Line  Duration           Peripheral IV 05/08/23 Distal;Dorsal (posterior); Right Forearm 4 days                Lab, Imaging and other studies:    Latest Reference Range & Units 05/12/23 05:34   LYMPHOCYTES ABSOLUTE 0 60 - 4 47 Thousand/uL 1 59   Monocytes Absolute 0 00 - 1 22 Thousand/uL 0 79     VTE Pharmacologic Prophylaxis:sq heparin   VTE Mechanical Prophylaxis: venodynes

## 2023-05-12 NOTE — PLAN OF CARE
Problem: PHYSICAL THERAPY ADULT  Goal: Performs mobility at highest level of function for planned discharge setting  See evaluation for individualized goals  Description: Treatment/Interventions: Functional transfer training, LE strengthening/ROM, Therapeutic exercise, Cognitive reorientation, Patient/family training, Equipment eval/education, Bed mobility, Gait training, Spoke to nursing, Spoke to case management  Equipment Recommended: Kirk Pandya       See flowsheet documentation for full assessment, interventions and recommendations  Outcome: Progressing  Note: Prognosis: Fair  Problem List: Decreased strength, Decreased endurance, Decreased mobility, Pain  Assessment: pt began tx session lying supine in the bed and was agreeable to participate in PT intervention  Progress was noted this tx session as pt was able to complete a supine<>sit EOB transfer w/a decrease in level of assistance required mod I as pt utilized bed rail w/o VC's to complete transfer  Barriers to Discharge: Inaccessible home environment  Barriers to Discharge Comments: stair trials as pt corrently is unsafe completing steps  PT Discharge Recommendation: Post acute rehabilitation services    See flowsheet documentation for full assessment

## 2023-05-12 NOTE — ASSESSMENT & PLAN NOTE
· Non-traumatic  · Patient was reportedly in his usual state of health when he awoke with sudden onset left abdominal/flank pain  Review of notes from [facility] -- he does have history of renal cysts/stones in the past  He is on Xarelto, and was given ASA by his wife prior to arrival to hospital   · CT A/P:  Large subcapsular hematoma compressing the left kidney measuring 13 5 x 15 8 x 9 7 cm and up to 6 2 cm in thickness  Linear hyperdensities within the posterior aspect of the hematoma compatible with active extravasation  There is surrounding perinephric and retroperitoneal hemorrhage  · Received Kcentra and DDAVP for reversal  · Resuscitated with blood products as below  · IR consulted for embolization / bleeding control  · Lower pole initially embolized and patient redeveloped shock  Returned to IR for repeat embolization of lower pole  · Now remains with stable hemodynamics and h/h    Plan:   · Voiding spontaneously  · If re-bleeds will likely need emergent nephrectomy for bleeding control  · Serial abdominal exams  · Trend hgb as below  · Urology consulted - input appreciated   · Urology recommends outpatient MRI of left kidney, f/u in 6 weeks with urology  · Monitor UO and renal indices closely

## 2023-05-12 NOTE — ASSESSMENT & PLAN NOTE
· INR 4 33 on arrival -- likely falsely elevated 2/2 Xarelto    Also received ASA by EMS prior to arrival   · Reversed on admission with Umberto Escort and DDAVP  · Holding AC/AP due to hemorrhagic shock  · INR most recently 1 21  · Hgb stable    Lab Results   Component Value Date    INR 1 30 (H) 05/11/2023    HGB 10 8 (L) 05/11/2023    HGB 9 4 (L) 05/10/2023    HGB 8 6 (L) 05/09/2023    HGB 8 3 (L) 05/08/2023

## 2023-05-12 NOTE — PHYSICAL THERAPY NOTE
PHYSICAL THERAPY NOTE          Patient Name: Kathy Mcqueen  BJDRR'P Date: 5/12/2023 05/12/23 1450   PT Last Visit   PT Visit Date 05/12/23   Note Type   Note Type Treatment   Pain Assessment   Pain Assessment Tool 0-10   Pain Score No Pain   Pain Location/Orientation Location: Back;Orientation: Right   Pain Onset/Description Onset: Ongoing   Effect of Pain on Daily Activities limited ambulation distance   Patient's Stated Pain Goal No pain   Hospital Pain Intervention(s) Repositioned; Emotional support; Ambulation/increased activity; Rest   Multiple Pain Sites No   Pain Rating: FLACC (Rest) - Face 0   Pain Rating: FLACC (Rest) - Legs 0   Pain Rating: FLACC (Rest) - Activity 0   Pain Rating: FLACC (Rest) - Cry 0   Pain Rating: FLACC (Rest) - Consolability 0   Score: FLACC (Rest) 0   Pain Rating: FLACC (Activity) - Face 0   Pain Rating: FLACC (Activity) - Legs 0   Pain Rating: FLACC (Activity) - Activity 0   Pain Rating: FLACC (Activity) - Cry 0   Pain Rating: FLACC (Activity) - Consolability 0   Score: FLACC (Activity) 0   Restrictions/Precautions   Weight Bearing Precautions Per Order No   Other Precautions Chair Alarm; Bed Alarm;Cognitive; Fall Risk;Pain   General   Chart Reviewed Yes   Response to Previous Treatment Patient with no complaints from previous session  Family/Caregiver Present Yes  (wife)   Cognition   Overall Cognitive Status Impaired   Arousal/Participation Alert; Responsive; Cooperative   Attention Attends with cues to redirect   Orientation Level Oriented to person;Oriented to place;Oriented to situation;Disoriented to time   Memory Decreased short term memory;Decreased recall of recent events   Following Commands Follows one step commands with increased time or repetition   Comments pt was pleasant and cooperative throughout tx session   Subjective   Subjective pt was agreeable to participate in PT intervention Bed Mobility   Supine to Sit 6  Modified independent   Additional items Assist x 1;HOB elevated; Bedrails; Increased time required   Sit to Supine 6  Modified independent   Additional items Assist x 1; Increased time required   Additional Comments pt was able to get in and out of bed with VC's and physical asisstance   Transfers   Sit to Stand 5  Supervision   Additional items Assist x 1; Armrests; Increased time required;Verbal cues   Stand to Sit 5  Supervision   Additional items Assist x 1; Armrests; Increased time required;Verbal cues   Stand pivot 5  Supervision   Additional items Assist x 1; Armrests; Increased time required;Verbal cues   Additional Comments pt was able complete multipe functional transfers in [facility] with RW and /s for all transfers w/o LOB   Ambulation/Elevation   Gait pattern Decreased foot clearance; Forward Flexion; Short stride   Gait Assistance 4  Minimal assist   Additional items Assist x 1;Verbal cues   Assistive Device Rolling walker   Distance 300'x1 RW 60'x2 RW   Stair Management Assistance 4  Minimal assist   Additional items Assist x 1;Verbal cues; Increased time required   Stair Management Technique One rail R;Alternating pattern; Foreward   Number of Stairs 13   Ambulation/Elevation Additional Comments (S)  pt very unsafe while performing stair trials  pt would not listen to VC's for safety  pt would only place fore foot on steps while ascending steps and pt utilized R sided hand rail and alternating foot pattern and required min to mod Ax1 for safety and balance   Descending steps pt used R sided hand rail and was reeducated on stepping step to pattern used with close /s no LOB   Balance   Static Sitting Fair +   Dynamic Sitting Fair   Static Standing Poor +   Dynamic Standing Poor +   Ambulatory Poor +   Endurance Deficit   Endurance Deficit Yes   Endurance Deficit Description limited activity tolerance and ambulation distance   Activity Tolerance   Activity Tolerance Patient limited by fatigue   Nurse Made Aware Spoke to RN   Exercises   Hip Abduction Sitting;15 reps;AROM; Bilateral   Hip Adduction Sitting;15 reps;AROM; Bilateral  (pillow squeezes)   Knee AROM Long Arc Quad Sitting;15 reps;AROM; Bilateral   Ankle Pumps Sitting;20 reps;AROM; Bilateral   Assessment   Prognosis Fair   Problem List Decreased strength;Decreased endurance;Decreased mobility;Pain   Assessment pt began tx session lying supine in the bed and was agreeable to participate in PT intervention  Progress was noted this tx session as pt was able to complete a supine<>sit EOB transfer w/a decrease in level of assistance required mod I as pt utilized bed rail w/o VC's to complete transfer  Barriers to Discharge Comments stair trials as pt corrently is unsafe completing steps   Goals   Patient Goals none stated this tx session   STG Expiration Date 05/14/23   PT Treatment Day 5   Plan   Treatment/Interventions Functional transfer training;LE strengthening/ROM; Elevations; Therapeutic exercise; Endurance training;Patient/family training;Equipment eval/education; Bed mobility;Gait training   Progress Slow progress, decreased activity tolerance   PT Frequency 3-5x/wk   Recommendation   PT Discharge Recommendation Post acute rehabilitation services   Equipment Recommended [facility] Recommended Wheeled walker   AM-PAC Basic Mobility Inpatient   Turning in Flat Bed Without Bedrails 3   Lying on Back to Sitting on Edge of Flat Bed Without Bedrails 3   Moving Bed to Chair 3   Standing Up From Chair Using Arms 3   Walk in Room 3   Climb 3-5 Stairs With Railing 2   Basic Mobility Inpatient Raw Score 17   Basic Mobility Standardized Score 39 67   Highest Level Of Mobility   JH-HLM Goal 5: Stand one or more mins   JH-HLM Achieved 8: Walk 250 feet ot more   Education   Education Provided Mobility training;Assistive device   Patient Demonstrates acceptance/verbal understanding   End of Consult   Patient Position at End of Consult Supine;Bed/Chair alarm activated; All needs within reach   The patient's AM-PAC Basic Mobility Inpatient Short Form Raw Score is 17  A Raw score of greater than 16 suggests the patient may benefit from discharge to home  Please also refer to the recommendation of the Physical Therapist for safe discharge planning  Pt continues to be unsafe with stair trials and continues to require between min to mod Ax1 for sasfety and balance   Pt would benefit from continued skilled PT intervention in order to increase activity toleranec and ambulation distance      Dontrell Dixon

## 2023-05-12 NOTE — QUICK NOTE
Received call from Mr Waller his wife stating that his Sinemet he was taking 25/100 1 5 tablets 3 times a day

## 2023-05-12 NOTE — CASE MANAGEMENT
Case Management Discharge Planning Note    Patient name Wilfred Merino S /S -10 MRN [id number]  : 1950 Date 2023       Current Admission Date: 5/3/2023  Current Admission Diagnosis:Kidney capsule rupture, left, initial encounter   Patient Active Problem List    Diagnosis Date Noted   • Hyperbilirubinemia 2023   • Acute blood loss anemia 2023   • Elevated troponin 2023   • Coagulopathy ([facility] [address] ) 2023   • Acute respiratory insufficiency 2023   • Kidney capsule rupture, left, initial encounter 2023   • Type 2 diabetes mellitus ([facility] [address] ) 2023   • CAD (coronary artery disease) 2023   • Seizure disorder ([facility] [address] ) 2023   • Leukemia ([facility] [address] ) 2023   • Splenic hemorrhage 2023   • Paroxysmal atrial fibrillation ([facility] [address] ) 2023      LOS (days): 9  Geometric Mean LOS (GMLOS) (days):   Days to GMLOS:     OBJECTIVE:  Risk of Unplanned Readmission Score: 23 03         Current admission status: Inpatient   Preferred Pharmacy:   [facility] [address] [id number]  Phone: [phone] Fax: [phone]    Primary Care Provider: Jarocho Werner DO    Primary Insurance: [facility]  Secondary Insurance:     DISCHARGE DETAILS:    Discharge planning discussed with[de-identified] Patient and wife, Horacio Bernal of Choice: Yes  Comments - Freedom of Choice: CM provided SNF choice list and [facility] choice list as a back up  Spouse requested [place] and [address] as a back up if insurance does not approved SNF    CM contacted family/caregiver?: Yes  Were Treatment Team discharge recommendations reviewed with patient/caregiver?: Yes  Did patient/caregiver verbalize understanding of patient care needs?: N/A- going to facility  Were patient/caregiver advised of the risks associated with not following Treatment Team discharge recommendations?: Yes    Contacts  Patient Contacts: Ailyn Horton spouse  Relationship to Patient[de-identified] Family  Contact Method: Phone  Phone Number: See face sheet  Reason/Outcome: Discharge Planning              Other Referral/Resources/Interventions Provided:  Interventions: Short Term Rehab  Referral Comments: CM provided SNF choice list and [facility] choice list as a back up  Spouse requested [facility] and [facility] Application [employer] [facility] [address] as a back up if insurance does not approved SNF      Would you like to participate in our [address] service program?  : No - Declined

## 2023-05-12 NOTE — PROGRESS NOTES
[facility]  Progress Note  Name: Gopi Hernandez  MRN: [id number]  Unit/Bed#: S -01 I Date of Admission: 5/3/2023   Date of Service: 5/11/2023 I Hospital Day: 8    Assessment/Plan   * Kidney capsule rupture, left, initial encounter  Assessment & Plan  · Non-traumatic  · Patient was reportedly in his usual state of health when he awoke with sudden onset left abdominal/flank pain  Review of notes from [facility] -- he does have history of renal cysts/stones in the past  He is on Xarelto, and was given ASA by his wife prior to arrival to hospital   · CT A/P:  Large subcapsular hematoma compressing the left kidney measuring 13 5 x 15 8 x 9 7 cm and up to 6 2 cm in thickness  Linear hyperdensities within the posterior aspect of the hematoma compatible with active extravasation  There is surrounding perinephric and retroperitoneal hemorrhage  · Received Kcentra and DDAVP for reversal  · Resuscitated with blood products as below  · IR consulted for embolization / bleeding control  · Lower pole initially embolized and patient redeveloped shock  Returned to IR for repeat embolization of lower pole  · Now remains with stable hemodynamics and h/h    Plan:   · Voiding spontaneously  · If re-bleeds will likely need emergent nephrectomy for bleeding control  · Serial abdominal exams  · Trend hgb as below  · Urology consulted - input appreciated   · Urology recommends outpatient MRI of left kidney, f/u in 6 weeks with urology  · Monitor UO and renal indices closely      Acute blood loss anemia  Assessment & Plan  Lab Results   Component Value Date    HGB 10 8 (L) 05/11/2023    HGB 9 4 (L) 05/10/2023    HGB 8 6 (L) 05/09/2023    HGB 8 3 (L) 05/08/2023       · 2/2 kidney capsule rupture in the setting of coagulopathy on home Xarelto and ASA in route to ER  · Reversed with KCentra and DDAVP  · Total Blood products to date  · PRBCs 4 units  · FFP 4 units  · Transfuse for HGB < 7 or hemodynamic instability  · H/H is stable  No signs of ongoing bleeding  Acute respiratory insufficiency  Assessment & Plan  -requiring 3L NC oxygen to maintain SpO2>92%  -Lung sounds with bilateral upper wheezing, not resolved with racemic epinephrine or lasix  -CT chest with moderate left pleural effusion, small right pleural effusion  -continue scheduled Xopenex neb treatments  -wean oxygen as tolerated    Coagulopathy (HCC)  Assessment & Plan  · INR 4 33 on arrival -- likely falsely elevated 2/2 Xarelto  Also received ASA by EMS prior to arrival   · Reversed on admission with Tash Carbon and DDAVP  · Holding AC/AP due to hemorrhagic shock  · INR most recently 1 21  · Hgb stable    Lab Results   Component Value Date    INR 1 30 (H) 05/11/2023    HGB 10 8 (L) 05/11/2023    HGB 9 4 (L) 05/10/2023    HGB 8 6 (L) 05/09/2023    HGB 8 3 (L) 05/08/2023         CAD (coronary artery disease)  Assessment & Plan  · Hx CABG 2020  · Hold AC/AP for now  · Hold Xarelto  · Continue metoprolol   · Continue statin  · Continue prn labetalol for HTN      Paroxysmal atrial fibrillation (HCC)  Assessment & Plan  · Home regimen: Xarelto and metoprolol  · Holding Xarelto for now, in the setting of hemorrhagic shock/kidney capsule rupture  · Continue telemetry  · Patient is now hemodynamically stable s/p volume resuscitation with blood products and IR procedure for embolization  Was on vasopressors for short period of time during volume resuscitation  No longer on vasopressors    · Continue metoprolol  · Holding AC/AP 2/2 ABLA with shock on admission in the setting of renal capsule rupture    Type 2 diabetes mellitus [facility])  Assessment & Plan  Lab Results   Component Value Date    HGBA1C 5 9 (H) 04/20/2023       Recent Labs     05/11/23  0738 05/11/23  1059 05/11/23  1601 05/11/23 2053   POCGLU 179* 149* 130 138       Blood Sugar Average: Last 72 hrs:  - Hold home medications   - Start sliding scale insulin with accuchecks    Seizure disorder "([facility] [address] )  Assessment & Plan  · Last seizure: July 2022 (was off medication at time of seizure due to cost)  · Currently well controlled with Vimpat  · Continue home Vimpat  Leukemia [facility])  Assessment & Plan  · S/p chemotherapy -- currently in remission for 14 years  Hyperbilirubinemia  Assessment & Plan  -noted 5/8, overall stable with T  Bili of 6, largely direct, with transaminitis  -no history of hepatitis, cirrhosis, alcohol use, or LFT abnormalities  -denies RUQ abdominal pain  -RUQ US with cholelithiasis, no evidence of acute cholecystitis  -GGT elevated, 171  -lipase, ammonia WNL  - MRCP shows pancreatic cystic mass  GI recommending outpatient EGD  Bowel Regimen: colace, senna  VTE Prophylaxis:Sequential compression device (Venodyne)  and Heparin     Disposition: continue med-surg status, rehab placement pending    Subjective   Chief Complaint: \"I'm feeling well\"    Subjective: Patient is OOB  He is comfortable  Denies SOB  Tolerating a diet  Denies abdominal pain, N/V  Slept well overnight  Objective   Vitals:   Temp:  [98 °F (36 7 °C)-99 °F (37 2 °C)] 98 7 °F (37 1 °C)  HR:  [] 99  Resp:  [16-22] 18  BP: (159-184)/() 174/100    I/O       05/10 0701  05/11 0700 05/11 0701  05/12 0700    P  O  340     Total Intake(mL/kg) 340 (3 5)     Urine (mL/kg/hr) 2200 (0 9)     Total Output 2200     Net -1860                  Physical Exam:   GENERAL APPEARANCE: NAD  NEURO: GCS 15,non-focal  HEENT: + icterus  CV: RRR, no MGR  LUNGS: CTA bilaterally; + upper airway wheezing present but mild/improved  GI: soft,non-tender,non-distended  : voiding  MSK: no edema, contusion or deformity  SKIN: + Jaundice    Invasive Devices     Peripheral Intravenous Line  Duration           Peripheral IV 05/08/23 Distal;Dorsal (posterior); Right Forearm 3 days          Drain  Duration           External Urinary Catheter 2 days                      Lab Results:   Results: I have personally reviewed all " pertinent laboratory/tests results, BMP/CMP:   Lab Results   Component Value Date    SODIUM 135 05/11/2023    K 3 8 05/11/2023     05/11/2023    CO2 26 05/11/2023    BUN 20 05/11/2023    CREATININE 1 17 05/11/2023    CALCIUM 8 6 05/11/2023    AST 47 (H) 05/11/2023    ALT 57 (H) 05/11/2023    ALKPHOS 225 (H) 05/11/2023    EGFR 62 05/11/2023   , CBC:   Lab Results   Component Value Date    WBC 19 50 (H) 05/11/2023    HGB 10 8 (L) 05/11/2023    HCT 33 7 (L) 05/11/2023    MCV 99 (H) 05/11/2023     05/11/2023    MCH 31 7 05/11/2023    MCHC 32 0 05/11/2023    RDW 14 5 05/11/2023    MPV 10 6 05/11/2023    NRBC 0 05/11/2023   , AST:   Lab Results   Component Value Date    AST 47 (H) 05/11/2023    and ALT:   Lab Results   Component Value Date    ALT 57 (H) 05/11/2023     Imaging: I have personally reviewed pertinent reports       Other Studies: no new

## 2023-05-13 LAB
ALBUMIN SERPL BCP-MCNC: 3.3 G/DL (ref 3.5–5)
ALP SERPL-CCNC: 219 U/L (ref 34–104)
ALT SERPL W P-5'-P-CCNC: 8 U/L (ref 7–52)
ANION GAP SERPL CALCULATED.3IONS-SCNC: 8 MMOL/L (ref 4–13)
AST SERPL W P-5'-P-CCNC: 42 U/L (ref 13–39)
BASOPHILS # BLD AUTO: 0.08 THOUSANDS/ÂΜL (ref 0–0.1)
BASOPHILS NFR BLD AUTO: 1 % (ref 0–1)
BILIRUB SERPL-MCNC: 4.25 MG/DL (ref 0.2–1)
BUN SERPL-MCNC: 20 MG/DL (ref 5–25)
CALCIUM ALBUM COR SERPL-MCNC: 8.8 MG/DL (ref 8.3–10.1)
CALCIUM SERPL-MCNC: 8.2 MG/DL (ref 8.4–10.2)
CHLORIDE SERPL-SCNC: 108 MMOL/L (ref 96–108)
CO2 SERPL-SCNC: 19 MMOL/L (ref 21–32)
CREAT SERPL-MCNC: 1.13 MG/DL (ref 0.6–1.3)
EOSINOPHIL # BLD AUTO: 0.35 THOUSAND/ÂΜL (ref 0–0.61)
EOSINOPHIL NFR BLD AUTO: 2 % (ref 0–6)
ERYTHROCYTE [DISTWIDTH] IN BLOOD BY AUTOMATED COUNT: 15 % (ref 11.6–15.1)
GFR SERPL CREATININE-BSD FRML MDRD: 65 ML/MIN/1.73SQ M
GLUCOSE SERPL-MCNC: 130 MG/DL (ref 65–140)
GLUCOSE SERPL-MCNC: 135 MG/DL (ref 65–140)
GLUCOSE SERPL-MCNC: 137 MG/DL (ref 65–140)
GLUCOSE SERPL-MCNC: 162 MG/DL (ref 65–140)
GLUCOSE SERPL-MCNC: 189 MG/DL (ref 65–140)
HCT VFR BLD AUTO: 34.9 % (ref 36.5–49.3)
HGB BLD-MCNC: 11.1 G/DL (ref 12–17)
IMM GRANULOCYTES # BLD AUTO: >0.5 THOUSAND/UL (ref 0–0.2)
IMM GRANULOCYTES NFR BLD AUTO: 11 % (ref 0–2)
LYMPHOCYTES # BLD AUTO: 0.95 THOUSANDS/ÂΜL (ref 0.6–4.47)
LYMPHOCYTES NFR BLD AUTO: 6 % (ref 14–44)
MCH RBC QN AUTO: 31.7 PG (ref 26.8–34.3)
MCHC RBC AUTO-ENTMCNC: 31.8 G/DL (ref 31.4–37.4)
MCV RBC AUTO: 100 FL (ref 82–98)
MONOCYTES # BLD AUTO: 1.2 THOUSAND/ÂΜL (ref 0.17–1.22)
MONOCYTES NFR BLD AUTO: 8 % (ref 4–12)
NEUTROPHILS # BLD AUTO: 11.16 THOUSANDS/ÂΜL (ref 1.85–7.62)
NEUTS SEG NFR BLD AUTO: 72 % (ref 43–75)
NRBC BLD AUTO-RTO: 0 /100 WBCS
PLATELET # BLD AUTO: 139 THOUSANDS/UL (ref 149–390)
PMV BLD AUTO: 11.1 FL (ref 8.9–12.7)
POTASSIUM SERPL-SCNC: 3.6 MMOL/L (ref 3.5–5.3)
PROT SERPL-MCNC: 5.9 G/DL (ref 6.4–8.4)
RBC # BLD AUTO: 3.5 MILLION/UL (ref 3.88–5.62)
SODIUM SERPL-SCNC: 135 MMOL/L (ref 135–147)
WBC # BLD AUTO: 15.37 THOUSAND/UL (ref 4.31–10.16)

## 2023-05-13 RX ADMIN — HEPARIN SODIUM 5000 UNITS: 5000 INJECTION INTRAVENOUS; SUBCUTANEOUS at 05:02

## 2023-05-13 RX ADMIN — SENNOSIDES 8.6 MG: 8.6 TABLET, FILM COATED ORAL at 21:36

## 2023-05-13 RX ADMIN — METHOCARBAMOL TABLETS 500 MG: 500 TABLET, COATED ORAL at 05:02

## 2023-05-13 RX ADMIN — INSULIN LISPRO 1 UNITS: 100 INJECTION, SOLUTION INTRAVENOUS; SUBCUTANEOUS at 12:22

## 2023-05-13 RX ADMIN — EZETIMIBE 10 MG: 10 TABLET ORAL at 17:42

## 2023-05-13 RX ADMIN — PANTOPRAZOLE SODIUM 40 MG: 40 TABLET, DELAYED RELEASE ORAL at 08:11

## 2023-05-13 RX ADMIN — METOPROLOL TARTRATE 25 MG: 25 TABLET, FILM COATED ORAL at 08:10

## 2023-05-13 RX ADMIN — HEPARIN SODIUM 5000 UNITS: 5000 INJECTION INTRAVENOUS; SUBCUTANEOUS at 21:37

## 2023-05-13 RX ADMIN — ACETAZOLAMIDE 500 MG: 250 TABLET ORAL at 21:36

## 2023-05-13 RX ADMIN — METHOCARBAMOL TABLETS 500 MG: 500 TABLET, COATED ORAL at 21:37

## 2023-05-13 RX ADMIN — LACOSAMIDE 100 MG: 100 TABLET, FILM COATED ORAL at 21:36

## 2023-05-13 RX ADMIN — LACOSAMIDE 100 MG: 100 TABLET, FILM COATED ORAL at 08:11

## 2023-05-13 RX ADMIN — METHOCARBAMOL TABLETS 500 MG: 500 TABLET, COATED ORAL at 14:20

## 2023-05-13 RX ADMIN — ACETAZOLAMIDE 500 MG: 250 TABLET ORAL at 08:10

## 2023-05-13 RX ADMIN — CARBIDOPA AND LEVODOPA 1.5 TABLET: 25; 100 TABLET ORAL at 08:10

## 2023-05-13 RX ADMIN — HEPARIN SODIUM 5000 UNITS: 5000 INJECTION INTRAVENOUS; SUBCUTANEOUS at 14:20

## 2023-05-13 RX ADMIN — INSULIN LISPRO 1 UNITS: 100 INJECTION, SOLUTION INTRAVENOUS; SUBCUTANEOUS at 21:35

## 2023-05-13 RX ADMIN — LIDOCAINE 5% 1 PATCH: 700 PATCH TOPICAL at 08:11

## 2023-05-13 RX ADMIN — CARBIDOPA AND LEVODOPA 1.5 TABLET: 25; 100 TABLET ORAL at 17:41

## 2023-05-13 RX ADMIN — CHLORHEXIDINE GLUCONATE 0.12% ORAL RINSE 15 ML: 1.2 LIQUID ORAL at 08:11

## 2023-05-13 RX ADMIN — DOCUSATE SODIUM 100 MG: 100 CAPSULE, LIQUID FILLED ORAL at 17:41

## 2023-05-13 RX ADMIN — DOCUSATE SODIUM 100 MG: 100 CAPSULE, LIQUID FILLED ORAL at 08:10

## 2023-05-13 RX ADMIN — LEVALBUTEROL HYDROCHLORIDE 1.25 MG: 1.25 SOLUTION RESPIRATORY (INHALATION) at 19:18

## 2023-05-13 RX ADMIN — METOPROLOL TARTRATE 25 MG: 25 TABLET, FILM COATED ORAL at 21:36

## 2023-05-13 RX ADMIN — CARBIDOPA AND LEVODOPA 1.5 TABLET: 25; 100 TABLET ORAL at 21:36

## 2023-05-13 RX ADMIN — PRAVASTATIN SODIUM 80 MG: 80 TABLET ORAL at 17:41

## 2023-05-13 RX ADMIN — PANTOPRAZOLE SODIUM 40 MG: 40 TABLET, DELAYED RELEASE ORAL at 21:36

## 2023-05-13 NOTE — PLAN OF CARE
Problem: MOBILITY - ADULT  Goal: Maintain or return to baseline ADL function  Description: INTERVENTIONS:  -  Assess patient's ability to carry out ADLs; assess patient's baseline for ADL function and identify physical deficits which impact ability to perform ADLs (bathing, care of mouth/teeth, toileting, grooming, dressing, etc )  - Assess/evaluate cause of self-care deficits   - Assess range of motion  - Assess patient's mobility; develop plan if impaired  - Assess patient's need for assistive devices and provide as appropriate  - Encourage maximum independence but intervene and supervise when necessary  - Involve family in performance of ADLs  - Assess for home care needs following discharge   - Consider OT consult to assist with ADL evaluation and planning for discharge  - Provide patient education as appropriate  Outcome: Progressing  Goal: Maintains/Returns to pre admission functional level  Description: INTERVENTIONS:  - Perform BMAT or MOVE assessment daily    - Set and communicate daily mobility goal to care team and patient/family/caregiver  - Collaborate with rehabilitation services on mobility goals if consulted  - Perform Range of Motion 3 times a day  - Reposition patient every 2 hours  - Dangle patient 2 times a day  - Stand patient 3 times a day  - Ambulate patient 3 times a day  - Out of bed to chair 3 times a day   - Out of bed for meals 3 times a day  - Out of bed for toileting  - Record patient progress and toleration of activity level   Outcome: Progressing     Problem: Nutrition/Hydration-ADULT  Goal: Nutrient/Hydration intake appropriate for improving, restoring or maintaining nutritional needs  Description: Monitor and assess patient's nutrition/hydration status for malnutrition  Collaborate with interdisciplinary team and initiate plan and interventions as ordered  Monitor patient's weight and dietary intake as ordered or per policy   Utilize nutrition screening tool and intervene as necessary  Determine patient's food preferences and provide high-protein, high-caloric foods as appropriate       INTERVENTIONS:  - Monitor oral intake, urinary output, labs, and treatment plans  - Assess nutrition and hydration status and recommend course of action  - Evaluate amount of meals eaten  - Assist patient with eating if necessary   - Allow adequate time for meals  - Recommend/ encourage appropriate diets, oral nutritional supplements, and vitamin/mineral supplements  - Order, calculate, and assess calorie counts as needed  - Recommend, monitor, and adjust tube feedings and TPN/PPN based on assessed needs  - Assess need for intravenous fluids  - Provide specific nutrition/hydration education as appropriate  - Include patient/family/caregiver in decisions related to nutrition  Outcome: Progressing     Problem: Prexisting or High Potential for Compromised Skin Integrity  Goal: Skin integrity is maintained or improved  Description: INTERVENTIONS:  - Identify patients at risk for skin breakdown  - Assess and monitor skin integrity  - Assess and monitor nutrition and hydration status  - Monitor labs   - Assess for incontinence   - Turn and reposition patient  - Assist with mobility/ambulation  - Relieve pressure over bony prominences  - Avoid friction and shearing  - Provide appropriate hygiene as needed including keeping skin clean and dry  - Evaluate need for skin moisturizer/barrier cream  - Collaborate with interdisciplinary team   - Patient/family teaching  - Consider wound care consult   Outcome: Progressing

## 2023-05-13 NOTE — ASSESSMENT & PLAN NOTE
Lab Results   Component Value Date    HGBA1C 5 9 (H) 04/20/2023       Recent Labs     05/12/23  1139 05/12/23  1606 05/12/23 2056 05/13/23  0751   POCGLU 205* 132 151* 130       Blood Sugar Average: Last 72 hrs:  - Hold home medications   - Start sliding scale insulin with accuchecks

## 2023-05-13 NOTE — ASSESSMENT & PLAN NOTE
· INR 4 33 on arrival -- likely falsely elevated 2/2 Xarelto    Also received ASA by EMS prior to arrival   · Reversed on admission with [facility] and DDAVP  · Holding AC/AP due to hemorrhagic shock  · INR most recently 1 21  · Hgb stable    Lab Results   Component Value Date    INR 1 30 (H) 05/11/2023    HGB 11 1 (L) 05/13/2023    HGB 10 7 (L) 05/12/2023    HGB 10 8 (L) 05/11/2023    HGB 9 4 (L) 05/10/2023

## 2023-05-13 NOTE — PROGRESS NOTES
[facility]  Progress Note  Name: Ian Terry  MRN: [id number]  Unit/Bed#: S -01 I Date of Admission: 5/3/2023   Date of Service: 5/13/2023 I Hospital Day: 10    Assessment/Plan   Hyperbilirubinemia  Assessment & Plan  -noted 5/8, overall stable with T  Bili of 6, largely direct, with transaminitis  -no history of hepatitis, cirrhosis, alcohol use, or LFT abnormalities  -denies RUQ abdominal pain  -RUQ US with cholelithiasis, no evidence of acute cholecystitis  -GGT elevated, 171  -lipase, ammonia WNL  - MRCP shows pancreatic cystic mass  GI recommending outpatient EGD  Acute blood loss anemia  Assessment & Plan  Lab Results   Component Value Date    HGB 11 1 (L) 05/13/2023    HGB 10 7 (L) 05/12/2023    HGB 10 8 (L) 05/11/2023    HGB 9 4 (L) 05/10/2023       · 2/2 kidney capsule rupture in the setting of coagulopathy on home Xarelto and ASA in route to ER  · Reversed with KCentra and DDAVP  · Total Blood products to date  · PRBCs 4 units  · FFP 4 units  · Transfuse for HGB < 7 or hemodynamic instability  · H/H is stable  No signs of ongoing bleeding  Acute respiratory insufficiency  Assessment & Plan  -requiring 3L NC oxygen to maintain SpO2>92%  -Lung sounds with bilateral upper wheezing, not resolved with racemic epinephrine or lasix  -CT chest with moderate left pleural effusion, small right pleural effusion  -continue scheduled Xopenex neb treatments  -wean oxygen as tolerated    Coagulopathy (HCC)  Assessment & Plan  · INR 4 33 on arrival -- likely falsely elevated 2/2 Xarelto    Also received ASA by EMS prior to arrival   · Reversed on admission with [facility] and DDAVP  · Holding AC/AP due to hemorrhagic shock  · INR most recently 1 21  · Hgb stable    Lab Results   Component Value Date    INR 1 30 (H) 05/11/2023    HGB 11 1 (L) 05/13/2023    HGB 10 7 (L) 05/12/2023    HGB 10 8 (L) 05/11/2023    HGB 9 4 (L) 05/10/2023         Paroxysmal atrial fibrillation [facility])  Assessment & Plan  · Home regimen: Xarelto and metoprolol  · Holding Xarelto for now, in the setting of hemorrhagic shock/kidney capsule rupture  · Continue telemetry  · Patient is now hemodynamically stable s/p volume resuscitation with blood products and IR procedure for embolization  Was on vasopressors for short period of time during volume resuscitation  No longer on vasopressors  · Continue metoprolol  · Holding AC/AP 2/2 ABLA with shock on admission in the setting of renal capsule rupture    Leukemia ([facility] [address] )  Assessment & Plan  · S/p chemotherapy -- currently in remission for 14 years  Seizure disorder [facility])  Assessment & Plan  · Last seizure: July 2022 (was off medication at time of seizure due to cost)  · Currently well controlled with Vimpat  · Continue home Vimpat  CAD (coronary artery disease)  Assessment & Plan  · Hx CABG 2020  · Hold AC/AP for now  · Hold Xarelto  · Continue metoprolol   · Continue statin  · Continue prn labetalol for HTN      Type 2 diabetes mellitus [facility])  Assessment & Plan  Lab Results   Component Value Date    HGBA1C 5 9 (H) 04/20/2023       Recent Labs     05/12/23  1139 05/12/23  1606 05/12/23  2056 05/13/23  0751   POCGLU 205* 132 151* 130       Blood Sugar Average: Last 72 hrs:  - Hold home medications   - Start sliding scale insulin with accuchecks    * Kidney capsule rupture, left, initial encounter  Assessment & Plan  · Non-traumatic  · Patient was reportedly in his usual state of health when he awoke with sudden onset left abdominal/flank pain  Review of notes from [facility] -- he does have history of renal cysts/stones in the past  He is on Xarelto, and was given ASA by his wife prior to arrival to hospital   · CT A/P:  Large subcapsular hematoma compressing the left kidney measuring 13 5 x 15 8 x 9 7 cm and up to 6 2 cm in thickness  Linear hyperdensities within the posterior aspect of the hematoma compatible with active extravasation    There is surrounding "perinephric and retroperitoneal hemorrhage  · Received Kcentra and DDAVP for reversal  · Resuscitated with blood products as below  · IR consulted for embolization / bleeding control  · Lower pole initially embolized and patient redeveloped shock  Returned to IR for repeat embolization of lower pole  · Now remains with stable hemodynamics and h/h    Plan:   · Voiding spontaneously  · If re-bleeds will likely need emergent nephrectomy for bleeding control  · Serial abdominal exams  · Trend hgb as below  · Urology consulted - input appreciated   · Urology recommends outpatient MRI of left kidney, f/u in 6 weeks with urology  · Monitor UO and renal indices closely  Bowel Regimen: Senna  VTE Prophylaxis:Sequential compression device (Venodyne)  and Heparin     Disposition: rehab    Subjective   Chief Complaint: stomach feels full    Subjective: \" morning\"     Objective   Vitals:   Temp:  [97 1 °F (36 2 °C)-98 5 °F (36 9 °C)] 97 9 °F (36 6 °C)  HR:  [] 94  Resp:  [16-18] 18  BP: (135-158)/(82-86) 148/85    I/O       05/11 0701  05/12 0700 05/12 0701  05/13 0700 05/13 0701  05/14 0700    P  O         Total Intake(mL/kg)       Urine (mL/kg/hr) 300 (0 1) 750 (0 3) 495 (1 6)    Total Output 300 750 495    Net -300 -750 -495                  Physical Exam:   GENERAL APPEARANCE: resting  NEURO: GCS - 15  HEENT: EOm's intact  CV: RRR< no complaints of chest pain  LUNGS: CTA bilaterally, no shortness of breath  GI: tolerating a diet  : voiding  MSK: moves around in bed  SKIN: dry    Invasive Devices     Peripheral Intravenous Line  Duration           Peripheral IV 05/13/23 Right;Ventral (anterior) Forearm <1 day                      Lab Results:    Latest Reference Range & Units 05/13/23 05:02   Sodium 135 - 147 mmol/L 135   Potassium 3 5 - 5 3 mmol/L 3 6   Chloride 96 - 108 mmol/L 108   CO2 21 - 32 mmol/L 19 (L)   Anion Gap 4 - 13 mmol/L 8   BUN 5 - 25 mg/dL 20   Creatinine 0 60 - 1 30 mg/dL 1 13 " Glucose, Random 65 - 140 mg/dL 137   Calcium 8 4 - 10 2 mg/dL 8 2 (L)   CORRECTED CALCIUM 8 3 - 10 1 mg/dL 8 8   AST 13 - 39 U/L 42 (H)   ALT 7 - 52 U/L 8   Alkaline Phosphatase 34 - 104 U/L 219 (H)   Total Protein 6 4 - 8 4 g/dL 5 9 (L)   Albumin 3 5 - 5 0 g/dL 3 3 (L)   TOTAL BILIRUBIN 0 20 - 1 00 mg/dL 4 25 (H)   eGFR ml/min/1 73sq m 65   (L): Data is abnormally low  (H): Data is abnormally high  Imaging: none  Other Studies: none

## 2023-05-13 NOTE — ASSESSMENT & PLAN NOTE
Lab Results   Component Value Date    HGB 11 1 (L) 05/13/2023    HGB 10 7 (L) 05/12/2023    HGB 10 8 (L) 05/11/2023    HGB 9 4 (L) 05/10/2023       · 2/2 kidney capsule rupture in the setting of coagulopathy on home Xarelto and ASA in route to ER  · Reversed with KCentra and DDAVP  · Total Blood products to date  · PRBCs 4 units  · FFP 4 units  · Transfuse for HGB < 7 or hemodynamic instability  · H/H is stable  No signs of ongoing bleeding

## 2023-05-13 NOTE — ASSESSMENT & PLAN NOTE
· Non-traumatic  · Patient was reportedly in his usual state of health when he awoke with sudden onset left abdominal/flank pain  Review of notes from [facility] -- he does have history of renal cysts/stones in the past  He is on Xarelto, and was given ASA by his wife prior to arrival to hospital   · CT A/P:  Large subcapsular hematoma compressing the left kidney measuring 13 5 x 15 8 x 9 7 cm and up to 6 2 cm in thickness  Linear hyperdensities within the posterior aspect of the hematoma compatible with active extravasation  There is surrounding perinephric and retroperitoneal hemorrhage  · Received Kcentra and DDAVP for reversal  · Resuscitated with blood products as below  · IR consulted for embolization / bleeding control  · Lower pole initially embolized and patient redeveloped shock  Returned to IR for repeat embolization of lower pole  · Now remains with stable hemodynamics and h/h    Plan:   · Voiding spontaneously  · If re-bleeds will likely need emergent nephrectomy for bleeding control  · Serial abdominal exams  · Trend hgb as below  · Urology consulted - input appreciated   · Urology recommends outpatient MRI of left kidney, f/u in 6 weeks with urology  · Monitor UO and renal indices closely

## 2023-05-14 LAB
GLUCOSE SERPL-MCNC: 125 MG/DL (ref 65–140)
GLUCOSE SERPL-MCNC: 130 MG/DL (ref 65–140)
GLUCOSE SERPL-MCNC: 136 MG/DL (ref 65–140)
GLUCOSE SERPL-MCNC: 168 MG/DL (ref 65–140)

## 2023-05-14 RX ADMIN — CARBIDOPA AND LEVODOPA 1.5 TABLET: 25; 100 TABLET ORAL at 18:48

## 2023-05-14 RX ADMIN — ACETAZOLAMIDE 500 MG: 250 TABLET ORAL at 08:01

## 2023-05-14 RX ADMIN — METOPROLOL TARTRATE 25 MG: 25 TABLET, FILM COATED ORAL at 08:01

## 2023-05-14 RX ADMIN — ACETAZOLAMIDE 500 MG: 250 TABLET ORAL at 21:07

## 2023-05-14 RX ADMIN — PANTOPRAZOLE SODIUM 40 MG: 40 TABLET, DELAYED RELEASE ORAL at 08:01

## 2023-05-14 RX ADMIN — CARBIDOPA AND LEVODOPA 1.5 TABLET: 25; 100 TABLET ORAL at 08:01

## 2023-05-14 RX ADMIN — DOCUSATE SODIUM 100 MG: 100 CAPSULE, LIQUID FILLED ORAL at 08:01

## 2023-05-14 RX ADMIN — LACOSAMIDE 100 MG: 100 TABLET, FILM COATED ORAL at 21:07

## 2023-05-14 RX ADMIN — LEVALBUTEROL HYDROCHLORIDE 1.25 MG: 1.25 SOLUTION RESPIRATORY (INHALATION) at 07:04

## 2023-05-14 RX ADMIN — PRAVASTATIN SODIUM 80 MG: 80 TABLET ORAL at 18:48

## 2023-05-14 RX ADMIN — CARBIDOPA AND LEVODOPA 1.5 TABLET: 25; 100 TABLET ORAL at 21:07

## 2023-05-14 RX ADMIN — DOCUSATE SODIUM 100 MG: 100 CAPSULE, LIQUID FILLED ORAL at 18:48

## 2023-05-14 RX ADMIN — METHOCARBAMOL TABLETS 500 MG: 500 TABLET, COATED ORAL at 21:07

## 2023-05-14 RX ADMIN — HEPARIN SODIUM 5000 UNITS: 5000 INJECTION INTRAVENOUS; SUBCUTANEOUS at 15:37

## 2023-05-14 RX ADMIN — METHOCARBAMOL TABLETS 500 MG: 500 TABLET, COATED ORAL at 15:37

## 2023-05-14 RX ADMIN — SENNOSIDES 8.6 MG: 8.6 TABLET, FILM COATED ORAL at 21:06

## 2023-05-14 RX ADMIN — METOPROLOL TARTRATE 25 MG: 25 TABLET, FILM COATED ORAL at 21:07

## 2023-05-14 RX ADMIN — METHOCARBAMOL TABLETS 500 MG: 500 TABLET, COATED ORAL at 05:04

## 2023-05-14 RX ADMIN — HEPARIN SODIUM 5000 UNITS: 5000 INJECTION INTRAVENOUS; SUBCUTANEOUS at 21:07

## 2023-05-14 RX ADMIN — PANTOPRAZOLE SODIUM 40 MG: 40 TABLET, DELAYED RELEASE ORAL at 21:07

## 2023-05-14 RX ADMIN — LACOSAMIDE 100 MG: 100 TABLET, FILM COATED ORAL at 08:01

## 2023-05-14 RX ADMIN — INSULIN LISPRO 1 UNITS: 100 INJECTION, SOLUTION INTRAVENOUS; SUBCUTANEOUS at 11:20

## 2023-05-14 RX ADMIN — EZETIMIBE 10 MG: 10 TABLET ORAL at 18:48

## 2023-05-14 RX ADMIN — HEPARIN SODIUM 5000 UNITS: 5000 INJECTION INTRAVENOUS; SUBCUTANEOUS at 05:04

## 2023-05-14 NOTE — ASSESSMENT & PLAN NOTE
· Non-traumatic  · Patient was reportedly in his usual state of health when he awoke with sudden onset left abdominal/flank pain  Review of notes from [facility] -- he does have history of renal cysts/stones in the past  He is on Xarelto, and was given ASA by his wife prior to arrival to hospital   · CT A/P:  Large subcapsular hematoma compressing the left kidney measuring 13 5 x 15 8 x 9 7 cm and up to 6 2 cm in thickness  Linear hyperdensities within the posterior aspect of the hematoma compatible with active extravasation  There is surrounding perinephric and retroperitoneal hemorrhage  · Received Kcentra and DDAVP for reversal  · Resuscitated with blood products as below  · IR consulted for embolization / bleeding control  · Lower pole initially embolized and patient redeveloped shock  Returned to IR for repeat embolization of lower pole  · Now remains with stable hemodynamics and h/h    Plan:   · Voiding spontaneously  · If re-bleeds will likely need emergent nephrectomy for bleeding control  · Serial abdominal exams  · Trend hgb as below  · Urology consulted - input appreciated   · Urology recommends outpatient MRI of left kidney, f/u in 6 weeks with urology  · Monitor UO and renal indices closely

## 2023-05-14 NOTE — ASSESSMENT & PLAN NOTE
· INR 4 33 on arrival -- likely falsely elevated 2/2 Xarelto    Also received ASA by EMS prior to arrival   · Reversed on admission with [facility] and DDAVP  · Holding AC/AP due to hemorrhagic shock  · INR most recently 1 21  · Hgb stable    Lab Results   Component Value Date    INR 1 30 (H) 05/11/2023    HGB 11 1 (L) 05/13/2023    HGB 10 7 (L) 05/12/2023    HGB 10 8 (L) 05/11/2023    HGB 9 4 (L) 05/10/2023

## 2023-05-14 NOTE — CASE MANAGEMENT
Case Management Discharge Planning Note    Patient name Kala Webster  Location S /S -40 MRN [id number]  : 1950 Date 2023       Current Admission Date: 5/3/2023  Current Admission Diagnosis:Kidney capsule rupture, left, initial encounter   Patient Active Problem List    Diagnosis Date Noted   • Hyperbilirubinemia 2023   • Acute blood loss anemia 2023   • Elevated troponin 2023   • Coagulopathy ([facility] [address] ) 2023   • Acute respiratory insufficiency 2023   • Kidney capsule rupture, left, initial encounter 2023   • Type 2 diabetes mellitus ([facility] [address] ) 2023   • CAD (coronary artery disease) 2023   • [facility] ) 2023   • Leukemia ([facility] ) 2023   • Splenic hemorrhage 2023   • Paroxysmal atrial fibrillation ([facility] [address] ) 2023      LOS (days): 11  Geometric Mean LOS (GMLOS) (days):   Days to GMLOS:     OBJECTIVE:  Risk of Unplanned Readmission Score: 19 64         Current admission status: Inpatient   Preferred Pharmacy:   [address] [id number]  Phone: [phone] Fax: [phone]    Primary Care Provider: Tonna Moritz, DO    Primary Insurance: [facility] REP  Secondary Insurance:     DISCHARGE DETAILS:  Patient's insurance authorization still pending for inpatient rehab at [facility]  CM sent updated referral to [facility] to please update CM once auth obtained

## 2023-05-14 NOTE — PROGRESS NOTES
[facility]  Progress Note  Name: Breanne Miles  MRN: [id number]  Unit/Bed#: S -01 I Date of Admission: 5/3/2023   Date of Service: 5/14/2023 I Hospital Day: 11    Assessment/Plan   Hyperbilirubinemia  Assessment & Plan  -noted 5/8, overall stable with T  Bili of 6, largely direct, with transaminitis  -no history of hepatitis, cirrhosis, alcohol use, or LFT abnormalities  -denies RUQ abdominal pain  -RUQ US with cholelithiasis, no evidence of acute cholecystitis  -GGT elevated, 171  -lipase, ammonia WNL  - MRCP shows pancreatic cystic mass  GI recommending outpatient EGD  Acute blood loss anemia  Assessment & Plan  Lab Results   Component Value Date    HGB 11 1 (L) 05/13/2023    HGB 10 7 (L) 05/12/2023    HGB 10 8 (L) 05/11/2023    HGB 9 4 (L) 05/10/2023       · 2/2 kidney capsule rupture in the setting of coagulopathy on home Xarelto and ASA in route to ER  · Reversed with KCentra and DDAVP  · Total Blood products to date  · PRBCs 4 units  · FFP 4 units  · Transfuse for HGB < 7 or hemodynamic instability  · H/H is stable  No signs of ongoing bleeding  Acute respiratory insufficiency  Assessment & Plan  -requiring 3L NC oxygen to maintain SpO2>92%  -Lung sounds with bilateral upper wheezing, not resolved with racemic epinephrine or lasix  -CT chest with moderate left pleural effusion, small right pleural effusion  -continue scheduled Xopenex neb treatments  -wean oxygen as tolerated  - no shortness of breath today    Coagulopathy (HCC)  Assessment & Plan  · INR 4 33 on arrival -- likely falsely elevated 2/2 Xarelto    Also received ASA by EMS prior to arrival   · Reversed on admission with [facility] and DDAVP  · Holding AC/AP due to hemorrhagic shock  · INR most recently 1 21  · Hgb stable    Lab Results   Component Value Date    INR 1 30 (H) 05/11/2023    HGB 11 1 (L) 05/13/2023    HGB 10 7 (L) 05/12/2023    HGB 10 8 (L) 05/11/2023    HGB 9 4 (L) 05/10/2023 Paroxysmal atrial fibrillation (HCC)  Assessment & Plan  · Home regimen: Xarelto and metoprolol  · Holding Xarelto for now, in the setting of hemorrhagic shock/kidney capsule rupture  · Continue telemetry  · Patient is now hemodynamically stable s/p volume resuscitation with blood products and IR procedure for embolization  Was on vasopressors for short period of time during volume resuscitation  No longer on vasopressors  · Continue metoprolol  · Holding AC/AP 2/2 ABLA with shock on admission in the setting of renal capsule rupture    Leukemia ([facility] [address] )  Assessment & Plan  · S/p chemotherapy -- currently in remission for 14 years  Seizure disorder [facility])  Assessment & Plan  · Last seizure: July 2022 (was off medication at time of seizure due to cost)  · Currently well controlled with Vimpat  · Continue home Vimpat  CAD (coronary artery disease)  Assessment & Plan  · Hx CABG 2020  · Hold AC/AP for now  · Hold Xarelto  · Continue metoprolol   · Continue statin  · Continue prn labetalol for HTN      Type 2 diabetes mellitus [facility])  Assessment & Plan  Lab Results   Component Value Date    HGBA1C 5 9 (H) 04/20/2023       Recent Labs     05/13/23  1106 05/13/23  1529 05/13/23  2121 05/14/23  0752   POCGLU 162* 135 189* 136       Blood Sugar Average: Last 72 hrs:  - Hold home medications   - Start sliding scale insulin with accuchecks    * Kidney capsule rupture, left, initial encounter  Assessment & Plan  · Non-traumatic  · Patient was reportedly in his usual state of health when he awoke with sudden onset left abdominal/flank pain  Review of notes from [facility] -- he does have history of renal cysts/stones in the past  He is on Xarelto, and was given ASA by his wife prior to arrival to hospital   · CT A/P:  Large subcapsular hematoma compressing the left kidney measuring 13 5 x 15 8 x 9 7 cm and up to 6 2 cm in thickness   Linear hyperdensities within the posterior aspect of the hematoma compatible with active "extravasation  There is surrounding perinephric and retroperitoneal hemorrhage  · Received Kcentra and DDAVP for reversal  · Resuscitated with blood products as below  · IR consulted for embolization / bleeding control  · Lower pole initially embolized and patient redeveloped shock  Returned to IR for repeat embolization of lower pole  · Now remains with stable hemodynamics and h/h    Plan:   · Voiding spontaneously  · If re-bleeds will likely need emergent nephrectomy for bleeding control  · Serial abdominal exams  · Trend hgb as below  · Urology consulted - input appreciated   · Urology recommends outpatient MRI of left kidney, f/u in 6 weeks with urology  · Monitor UO and renal indices closely  Bowel Regimen: Colace and Senna  VTE Prophylaxis:Sequential compression device (Venodyne)  and Heparin     Disposition: rehab    Subjective   Chief Complaint: Ready to leave    Subjective: \" I want to go to Rehab\"     Objective   Vitals:   Temp:  [97 6 °F (36 4 °C)-98 3 °F (36 8 °C)] 97 7 °F (36 5 °C)  HR:  [] 96  BP: (130-162)/(75-92) 148/75    I/O       05/12 0701  05/13 0700 05/13 0701  05/14 0700 05/14 0701  05/15 0700    P  O   240     Total Intake(mL/kg)  240 (2 7)     Urine (mL/kg/hr) 750 (0 3) 495 (0 2)     Total Output 750 495     Net -750 -255            Unmeasured Urine Occurrence  1 x     Unmeasured Stool Occurrence  0 x            Physical Exam:   GENERAL APPEARANCE: resting in bed  NEURO: GCS - 15  HEENT: EOm's intact  CV: RRR< no complaints of chest pain  LUNGS: CTA bilaterally, no shortness of breath  GI: tolerating diet  : voiding  MSK: moving extremities  SKIN: warm and dry    Invasive Devices     Peripheral Intravenous Line  Duration           Peripheral IV 05/13/23 Right;Ventral (anterior) Forearm 1 day                      Lab Results:    Latest Reference Range & Units 05/14/23 07:52   POC Glucose 65 - 140 mg/dl 136     Imaging: none  Other Studies: none     "

## 2023-05-14 NOTE — ASSESSMENT & PLAN NOTE
-requiring 3L NC oxygen to maintain SpO2>92%  -Lung sounds with bilateral upper wheezing, not resolved with racemic epinephrine or lasix  -CT chest with moderate left pleural effusion, small right pleural effusion  -continue scheduled Xopenex neb treatments  -wean oxygen as tolerated  - no shortness of breath today

## 2023-05-14 NOTE — ASSESSMENT & PLAN NOTE
Lab Results   Component Value Date    HGBA1C 5 9 (H) 04/20/2023       Recent Labs     05/13/23  1106 05/13/23  1529 05/13/23  2121 05/14/23  0752   POCGLU 162* 135 189* 136       Blood Sugar Average: Last 72 hrs:  - Hold home medications   - Start sliding scale insulin with accuchecks

## 2023-05-14 NOTE — PLAN OF CARE
Problem: MOBILITY - ADULT  Goal: Maintain or return to baseline ADL function  Description: INTERVENTIONS:  -  Assess patient's ability to carry out ADLs; assess patient's baseline for ADL function and identify physical deficits which impact ability to perform ADLs (bathing, care of mouth/teeth, toileting, grooming, dressing, etc )  - Assess/evaluate cause of self-care deficits   - Assess range of motion  - Assess patient's mobility; develop plan if impaired  - Assess patient's need for assistive devices and provide as appropriate  - Encourage maximum independence but intervene and supervise when necessary  - Involve family in performance of ADLs  - Assess for home care needs following discharge   - Consider OT consult to assist with ADL evaluation and planning for discharge  - Provide patient education as appropriate  Outcome: Progressing  Goal: Maintains/Returns to pre admission functional level  Description: INTERVENTIONS:  - Perform BMAT or MOVE assessment daily    - Set and communicate daily mobility goal to care team and patient/family/caregiver  - Collaborate with rehabilitation services on mobility goals if consulted  - Perform Range of Motion times a day  - Reposition patient every  hours  - Dangle patient  times a day  - Stand patient  times a day  - Ambulate patient  times a day  - Out of bed to chair  times a day   - Out of bed for meals  times a day  - Out of bed for toileting  - Record patient progress and toleration of activity level   Outcome: Progressing     Problem: Nutrition/Hydration-ADULT  Goal: Nutrient/Hydration intake appropriate for improving, restoring or maintaining nutritional needs  Description: Monitor and assess patient's nutrition/hydration status for malnutrition  Collaborate with interdisciplinary team and initiate plan and interventions as ordered  Monitor patient's weight and dietary intake as ordered or per policy   Utilize nutrition screening tool and intervene as necessary  Determine patient's food preferences and provide high-protein, high-caloric foods as appropriate       INTERVENTIONS:  - Monitor oral intake, urinary output, labs, and treatment plans  - Assess nutrition and hydration status and recommend course of action  - Evaluate amount of meals eaten  - Assist patient with eating if necessary   - Allow adequate time for meals  - Recommend/ encourage appropriate diets, oral nutritional supplements, and vitamin/mineral supplements  - Order, calculate, and assess calorie counts as needed  - Recommend, monitor, and adjust tube feedings and TPN/PPN based on assessed needs  - Assess need for intravenous fluids  - Provide specific nutrition/hydration education as appropriate  - Include patient/family/caregiver in decisions related to nutrition  Outcome: Progressing     Problem: Prexisting or High Potential for Compromised Skin Integrity  Goal: Skin integrity is maintained or improved  Description: INTERVENTIONS:  - Identify patients at risk for skin breakdown  - Assess and monitor skin integrity  - Assess and monitor nutrition and hydration status  - Monitor labs   - Assess for incontinence   - Turn and reposition patient  - Assist with mobility/ambulation  - Relieve pressure over bony prominences  - Avoid friction and shearing  - Provide appropriate hygiene as needed including keeping skin clean and dry  - Evaluate need for skin moisturizer/barrier cream  - Collaborate with interdisciplinary team   - Patient/family teaching  - Consider wound care consult   Outcome: Progressing     Problem: SAFETY,RESTRAINT: NV/NON-SELF DESTRUCTIVE BEHAVIOR  Goal: Remains free of harm/injury (restraint for non violent/non self-detsructive behavior)  Description: INTERVENTIONS:  - Instruct patient/family regarding restraint use   - Assess and monitor physiologic and psychological status   - Provide interventions and comfort measures to meet assessed patient needs   - Identify and implement measures to help patient regain control  - Assess readiness for release of restraint   Outcome: Progressing  Goal: Returns to optimal restraint-free functioning  Description: INTERVENTIONS:  - Assess the patient's behavior and symptoms that indicate continued need for restraint  - Identify and implement measures to help patient regain control  - Assess readiness for release of restraint   Outcome: Progressing

## 2023-05-15 ENCOUNTER — TELEPHONE (OUTPATIENT)
Dept: OTHER | Facility: OTHER | Age: 73
End: 2023-05-15

## 2023-05-15 ENCOUNTER — APPOINTMENT (INPATIENT)
Dept: RADIOLOGY | Facility: HOSPITAL | Age: 73
End: 2023-05-15

## 2023-05-15 ENCOUNTER — TELEPHONE (OUTPATIENT)
Dept: SURGERY | Facility: CLINIC | Age: 73
End: 2023-05-15

## 2023-05-15 VITALS
SYSTOLIC BLOOD PRESSURE: 151 MMHG | HEIGHT: 70 IN | OXYGEN SATURATION: 97 % | HEART RATE: 81 BPM | BODY MASS INDEX: 28.12 KG/M2 | WEIGHT: 196.43 LBS | TEMPERATURE: 98.1 F | RESPIRATION RATE: 18 BRPM | DIASTOLIC BLOOD PRESSURE: 82 MMHG

## 2023-05-15 LAB
GLUCOSE SERPL-MCNC: 136 MG/DL (ref 65–140)
GLUCOSE SERPL-MCNC: 164 MG/DL (ref 65–140)
HSV1 DNA SPEC QL NAA+PROBE: NEGATIVE
HSV2 DNA SPEC QL NAA+PROBE: NEGATIVE

## 2023-05-15 RX ORDER — METHOCARBAMOL 500 MG/1
500 TABLET, FILM COATED ORAL EVERY 8 HOURS SCHEDULED
Qty: 30 TABLET | Refills: 0 | Status: SHIPPED | OUTPATIENT
Start: 2023-05-15 | End: 2023-05-15 | Stop reason: SDUPTHER

## 2023-05-15 RX ORDER — ACETAMINOPHEN 325 MG/1
650 TABLET ORAL EVERY 6 HOURS PRN
Qty: 30 TABLET | Refills: 0 | Status: SHIPPED | OUTPATIENT
Start: 2023-05-15

## 2023-05-15 RX ORDER — METHOCARBAMOL 500 MG/1
500 TABLET, FILM COATED ORAL EVERY 8 HOURS SCHEDULED
Qty: 30 TABLET | Refills: 0 | Status: SHIPPED | OUTPATIENT
Start: 2023-05-15

## 2023-05-15 RX ORDER — ACETAZOLAMIDE 250 MG/1
500 TABLET ORAL EVERY 12 HOURS SCHEDULED
Qty: 10 TABLET | Refills: 0 | Status: SHIPPED | OUTPATIENT
Start: 2023-05-15 | End: 2023-05-15 | Stop reason: SDUPTHER

## 2023-05-15 RX ORDER — LIDOCAINE 50 MG/G
1 PATCH TOPICAL DAILY
Qty: 10 PATCH | Refills: 0 | Status: SHIPPED | OUTPATIENT
Start: 2023-05-16

## 2023-05-15 RX ORDER — PANTOPRAZOLE SODIUM 40 MG/1
40 TABLET, DELAYED RELEASE ORAL EVERY 12 HOURS SCHEDULED
Qty: 20 TABLET | Refills: 0 | Status: SHIPPED | OUTPATIENT
Start: 2023-05-15 | End: 2023-05-15 | Stop reason: SDUPTHER

## 2023-05-15 RX ORDER — OXYCODONE HYDROCHLORIDE 5 MG/1
2.5 TABLET ORAL EVERY 4 HOURS PRN
Qty: 30 TABLET | Refills: 0 | Status: SHIPPED | OUTPATIENT
Start: 2023-05-15 | End: 2023-05-15 | Stop reason: SDUPTHER

## 2023-05-15 RX ORDER — PANTOPRAZOLE SODIUM 40 MG/1
40 TABLET, DELAYED RELEASE ORAL EVERY 12 HOURS SCHEDULED
Qty: 20 TABLET | Refills: 0 | Status: SHIPPED | OUTPATIENT
Start: 2023-05-15

## 2023-05-15 RX ORDER — ACETAZOLAMIDE 250 MG/1
500 TABLET ORAL EVERY 12 HOURS SCHEDULED
Qty: 10 TABLET | Refills: 0 | Status: SHIPPED | OUTPATIENT
Start: 2023-05-15

## 2023-05-15 RX ORDER — ACETAMINOPHEN 325 MG/1
650 TABLET ORAL EVERY 6 HOURS PRN
Qty: 30 TABLET | Refills: 0 | Status: SHIPPED | OUTPATIENT
Start: 2023-05-15 | End: 2023-05-15 | Stop reason: SDUPTHER

## 2023-05-15 RX ORDER — SENNOSIDES 8.6 MG
8.6 TABLET ORAL
Qty: 10 TABLET | Refills: 0 | Status: SHIPPED | OUTPATIENT
Start: 2023-05-15 | End: 2023-05-15 | Stop reason: SDUPTHER

## 2023-05-15 RX ORDER — OXYCODONE HYDROCHLORIDE 5 MG/1
2.5 TABLET ORAL EVERY 4 HOURS PRN
Qty: 30 TABLET | Refills: 0 | Status: SHIPPED | OUTPATIENT
Start: 2023-05-15 | End: 2023-05-25

## 2023-05-15 RX ORDER — SENNOSIDES 8.6 MG
8.6 TABLET ORAL
Qty: 10 TABLET | Refills: 0 | Status: SHIPPED | OUTPATIENT
Start: 2023-05-15

## 2023-05-15 RX ADMIN — ACETAZOLAMIDE 500 MG: 250 TABLET ORAL at 08:13

## 2023-05-15 RX ADMIN — METOPROLOL TARTRATE 25 MG: 25 TABLET, FILM COATED ORAL at 08:13

## 2023-05-15 RX ADMIN — INSULIN LISPRO 1 UNITS: 100 INJECTION, SOLUTION INTRAVENOUS; SUBCUTANEOUS at 11:28

## 2023-05-15 RX ADMIN — LIDOCAINE 5% 1 PATCH: 700 PATCH TOPICAL at 08:13

## 2023-05-15 RX ADMIN — METHOCARBAMOL TABLETS 500 MG: 500 TABLET, COATED ORAL at 05:04

## 2023-05-15 RX ADMIN — CARBIDOPA AND LEVODOPA 1.5 TABLET: 25; 100 TABLET ORAL at 08:13

## 2023-05-15 RX ADMIN — PANTOPRAZOLE SODIUM 40 MG: 40 TABLET, DELAYED RELEASE ORAL at 08:14

## 2023-05-15 RX ADMIN — METHOCARBAMOL TABLETS 500 MG: 500 TABLET, COATED ORAL at 13:53

## 2023-05-15 RX ADMIN — LACOSAMIDE 100 MG: 100 TABLET, FILM COATED ORAL at 08:13

## 2023-05-15 RX ADMIN — CHLORHEXIDINE GLUCONATE 0.12% ORAL RINSE 15 ML: 1.2 LIQUID ORAL at 08:12

## 2023-05-15 RX ADMIN — DOCUSATE SODIUM 100 MG: 100 CAPSULE, LIQUID FILLED ORAL at 08:13

## 2023-05-15 RX ADMIN — HEPARIN SODIUM 5000 UNITS: 5000 INJECTION INTRAVENOUS; SUBCUTANEOUS at 13:53

## 2023-05-15 RX ADMIN — HEPARIN SODIUM 5000 UNITS: 5000 INJECTION INTRAVENOUS; SUBCUTANEOUS at 05:04

## 2023-05-15 NOTE — NURSING NOTE
Patient discharged home  IV removed per protocol  AVS reviewed in detail with patient and spouse  Patient agreed to follow up with family medicine and trauma within 2 weeks  All questions and concerns were addressed at this time

## 2023-05-15 NOTE — TELEPHONE ENCOUNTER
Medications need to be resent to [facility] pharmacy on [address] [phone]

## 2023-05-15 NOTE — PLAN OF CARE
Problem: PHYSICAL THERAPY ADULT  Goal: Performs mobility at highest level of function for planned discharge setting  See evaluation for individualized goals  Description: Treatment/Interventions: Functional transfer training, LE strengthening/ROM, Therapeutic exercise, Cognitive reorientation, Patient/family training, Equipment eval/education, Bed mobility, Gait training, Spoke to nursing, Spoke to case management  Equipment Recommended: Mechelle Calle       See flowsheet documentation for full assessment, interventions and recommendations  Outcome: Progressing  Note: Prognosis: Fair  Problem List: Decreased strength, Decreased endurance, Impaired balance, Decreased mobility, Decreased cognition, Pain, Obesity  Assessment: Pt continues to make functional improvements, and is requiring less asssitance for bed mobility, functional transfers and ambulation today  Pt demosntrated ability to ambulate without RW with steady gait pattern  He does continue to have decreased foot clearance and short stride, moderate carryover with cues to improve  As compared to previous PT treatment session, pt demonstrated improved control and safety with stair negotiation this session as well  Did not experience LOB on stairs  He does demosntrate slightly decreased foot clearance on stairs when fatigued  Educated patient on pacing for energy conservation and safety  Overall pt is progressing well toward pt and PT goals  At this time would reccomend home with HHPT  Barriers to Discharge: Inaccessible home environment  Barriers to Discharge Comments: stair trials as pt corrently is unsafe completing steps  PT Discharge Recommendation: Home with home health rehabilitation    See flowsheet documentation for full assessment

## 2023-05-15 NOTE — CASE MANAGEMENT
DCS Received call from Xiomara Quintero from [employer] about pending SNF auth  Calling to notify of peer to peer requested by MD  Stated it is not an intent to deny however pt does appear to be able to receive services at lower level of care such as home with [facility]       Deadline: 5/16/23 10AM  P# [phone] opt 5     Care Manager notified: Jamal Flores

## 2023-05-15 NOTE — ASSESSMENT & PLAN NOTE
Lab Results   Component Value Date    HGBA1C 5 9 (H) 04/20/2023       Recent Labs     05/14/23  1524 05/14/23  2053 05/15/23  0707 05/15/23  1104   POCGLU 125 130 136 164*       Blood Sugar Average: Last 72 hrs:  - Hold home medications   - Start sliding scale insulin with accuchecks

## 2023-05-15 NOTE — MALNUTRITION/BMI
This medical record reflects one or more clinical indicators suggestive of malnutrition and/or morbid obesity  Malnutrition Findings:   Adult Malnutrition type: Acute illness  Adult Degree of Malnutrition: Unspecified protein calorie malnutrition  Malnutrition Characteristics: Inadequate energy                  360 Statement: Unspecified protein calorie malnutrition related to inaduate PO intake as evidenced by 7 day period of poor intake, stress of illness  Currently treated with liberalized diet, oral supplementation  BMI Findings: Body mass index is 28 18 kg/m²  See Nutrition note dated 5/15/2023 for additional details  Completed nutrition assessment is viewable in the nutrition documentation

## 2023-05-15 NOTE — ASSESSMENT & PLAN NOTE
· INR 4 33 on arrival -- likely falsely elevated 2/2 Xarelto    Also received ASA by EMS prior to arrival   · Reversed on admission with [facility] and DDAVP  · Holding AC/AP due to hemorrhagic shock  · INR most recently 1 21  · Hgb stable    Lab Results   Component Value Date    INR 1 30 (H) 05/11/2023    HGB 11 1 (L) 05/13/2023    HGB 10 7 (L) 05/12/2023    HGB 10 8 (L) 05/11/2023    HGB 9 4 (L) 05/10/2023

## 2023-05-15 NOTE — DISCHARGE INSTR - AVS FIRST PAGE
Follow up with all recommended consultants  Continue medications as prescribed  If develop pain out of the ordinary go to the Emergency Room

## 2023-05-15 NOTE — ASSESSMENT & PLAN NOTE
-noted 5/8, overall stable with T  Bili of 6, largely direct, with transaminitis  -no history of hepatitis, cirrhosis, alcohol use, or LFT abnormalities  -denies RUQ abdominal pain  -RUQ US with cholelithiasis, no evidence of acute cholecystitis  -GGT elevated, 171  -lipase, ammonia WNL  - MRCP shows pancreatic cystic mass  GI recommending outpatient EGD   Will follow up with his GI

## 2023-05-15 NOTE — DISCHARGE SUMMARY
[facility]  Discharge- Cristian Jefferson II 1950, 72 y o  male MRN: [id number]  Unit/Bed#: S -01 Encounter: [id number]  Primary Care Provider: Oziel Toney DO   Date and time admitted to hospital: 5/3/2023  1:10 AM    Hyperbilirubinemia  Assessment & Plan  -noted 5/8, overall stable with T  Bili of 6, largely direct, with transaminitis  -no history of hepatitis, cirrhosis, alcohol use, or LFT abnormalities  -denies RUQ abdominal pain  -RUQ US with cholelithiasis, no evidence of acute cholecystitis  -GGT elevated, 171  -lipase, ammonia WNL  - MRCP shows pancreatic cystic mass  GI recommending outpatient EGD  Will follow up with his GI    Acute blood loss anemia  Assessment & Plan  Lab Results   Component Value Date    HGB 11 1 (L) 05/13/2023    HGB 10 7 (L) 05/12/2023    HGB 10 8 (L) 05/11/2023    HGB 9 4 (L) 05/10/2023       · 2/2 kidney capsule rupture in the setting of coagulopathy on home Xarelto and ASA in route to ER  · Reversed with [facility] and DDAVP  · Total Blood products to date  · PRBCs 4 units  · FFP 4 units  · Transfuse for HGB < 7 or hemodynamic instability  · H/H is stable  No signs of ongoing bleeding  Acute respiratory insufficiency  Assessment & Plan  -requiring 3L NC oxygen to maintain SpO2>92%  -Lung sounds with bilateral upper wheezing, not resolved with racemic epinephrine or lasix  -CT chest with moderate left pleural effusion, small right pleural effusion  -continue scheduled Xopenex neb treatments  -wean oxygen as tolerated  - no shortness of breath today    Coagulopathy (HCC)  Assessment & Plan  · INR 4 33 on arrival -- likely falsely elevated 2/2 Xarelto    Also received ASA by EMS prior to arrival   · Reversed on admission with [facility] and DDAVP  · Holding AC/AP due to hemorrhagic shock  · INR most recently 1 21  · Hgb stable    Lab Results   Component Value Date    INR 1 30 (H) 05/11/2023    HGB 11 1 (L) 05/13/2023    HGB 10 7 (L) 05/12/2023 HGB 10 8 (L) 05/11/2023    HGB 9 4 (L) 05/10/2023         Paroxysmal atrial fibrillation (HCC)  Assessment & Plan  · Home regimen: Xarelto and metoprolol  · Holding Xarelto for now, in the setting of hemorrhagic shock/kidney capsule rupture  · Continue telemetry  · Patient is now hemodynamically stable s/p volume resuscitation with blood products and IR procedure for embolization  Was on vasopressors for short period of time during volume resuscitation  No longer on vasopressors  · Continue metoprolol  · Holding AC/AP 2/2 ABLA with shock on admission in the setting of renal capsule rupture    Leukemia ([facility] )  Assessment & Plan  · S/p chemotherapy -- currently in remission for 14 years  Seizure disorder [facility])  Assessment & Plan  · Last seizure: July 2022 (was off medication at time of seizure due to cost)  · Currently well controlled with Vimpat  · Continue home Vimpat  CAD (coronary artery disease)  Assessment & Plan  · Hx CABG 2020  · Hold AC/AP for now  · Hold Xarelto  · Continue metoprolol   · Continue statin  · Continue prn labetalol for HTN      Type 2 diabetes mellitus [facility])  Assessment & Plan  Lab Results   Component Value Date    HGBA1C 5 9 (H) 04/20/2023       Recent Labs     05/14/23  1524 05/14/23  2053 05/15/23  0707 05/15/23  1104   POCGLU 125 130 136 164*       Blood Sugar Average: Last 72 hrs:  - Hold home medications   - Start sliding scale insulin with accuchecks      PE:  More alert today  GCS - 15 and pleasant  RRR< no complaints of chestpain  Lungs CTA bilaterally, no shortness of breath  Abdomen round and firm, unchanged  Left wrist  Pain with palpation and using the walker, will obtain and x-ray  Skin warm and dry  Moving extremities, ambulated to bathroom  Wife wishes to take him home today  Will follow up with GI and Urology as an outpatient          Medical Problems     Resolved Problems  Date Reviewed: 5/15/2023          Resolved    Hemorrhagic shock ([facility] ) 5/5/2023     Resolved by "KIANNA Hanna    SHELIA (acute kidney injury) ([facility] ) 5/9/2023     Resolved by  Jc Barrow DO          Admission Date:   Admission Orders (From admission, onward)     Ordered        05/03/23 0300  Inpatient Admission  Once                        Admitting Diagnosis: Hemorrhagic shock ([facility] ) [R57 8]  Abdominal pain [R10 9]  Renal hemorrhage, left [N28 89]  Kidney capsule rupture, left, initial encounter [S37 062A]    HPI: wade Love, Marielos Daft:  Mauricio Gaviria II is a 72 y o  male with PMH Seizure disorder, DM type 2, CAD, leukemia in remission, who presents with left sided abdominal pain  He reports the pain started suddenly this evening around 11:30 and was severe at onset  He felt associated malaise, nausea, and generally unwell  He denies recent traumatic injury, falls, vigorous activity, illness  \"  Procedures Performed:   Orders Placed This Encounter   Procedures   • Critical Care       Summary of Hospital Course: 71 y/o male with spontaneous     Significant Findings, Care, Treatment and Services Provided: XR chest portable    Result Date: 5/9/2023  Impression: No interval change  Workstation performed: [id number]     XR chest portable    Result Date: 5/9/2023  Impression: Bilateral pleural effusions, larger on the left and lower lobe compressive atelectasis versus infiltrates  Workstation performed: [id number]     XR chest portable    Result Date: 5/5/2023  Impression: Small left and trace right effusion  Workstation performed: [id number]     XR chest 1 view portable    Result Date: 5/3/2023  Impression: No free air is seen under the diaphragms   There is a small left effusion and mild bibasilar subsegmental atelectasis Workstation performed: [id number]     XR abdomen 1 view kub    Result Date: 5/3/2023  Impression: No evidence of bowel obstruction Workstation performed: [id number]     CT chest wo contrast    Result Date: 5/7/2023  Impression: Moderate left and small right effusion with " compressive atelectasis of the lower lobes  No acute pulmonary disease  Partially imaged left perinephric hematoma with minimal ascites  Workstation performed: [id number]     X-ray chest 1 view    Result Date: 5/4/2023  Impression: Satisfactory position of the endotracheal tube  Workstation performed: [id number]     CTA chest (pe study) abdomen pelvis routine contrast    Result Date: 5/3/2023  Impression: Increased size of the known left perinephric hematoma status post left renal arterial embolization with a site of recurrent posterior perinephric active hemorrhage  Severe mass effect from the hematoma on the left kidney  There is also increased intra-abdominal hemorrhage  Small left and trace right pleural effusion; no acute pulmonary arterial embolism  Diffuse esophageal dilation with moderate circumferential thickening of the distal esophagus in keeping with a nonspecific esophagitis  I personally discussed this study with [facility] on 5/3/2023 4:50 PM  Workstation performed: [id number]     US right upper quadrant    Result Date: 5/8/2023  Impression: Visualized portion of CBD normal in caliber measuring 4 mm  No intrahepatic biliary ductal dilatation  Multiple gallstones within the gallbladder, however no evidence of acute cholecystitis  Right upper quadrant ascites  Pancreas not visualized due to overlying bowel gas  Workstation performed: [id number]     CT abdomen pelvis with contrast    Result Date: 5/3/2023  Impression: 1  Large subcapsular hematoma compressing the left kidney measuring approximately 13 5 x 15 8 x 9 7 cm and up to 6 2 cm in thickness  Linear hyperdensities within the posterior aspect of the hematoma compatible with active extravasation  There is surrounding perinephric and retroperitoneal hemorrhage  2   Vague hypodensities are seen within the superior aspect of the spleen measuring up to 4 cm and 1 9 cm respectively  These areas may reflect splenic infarction less likely laceration   There are foci of hyperdensity at the superior aspect of the spleen  which was present on prior exam and likely represent a hemangioma versus calcification  3   Flattening of the IVC compatible with hypovolemia  4   Cholelithiasis without evidence of cholecystitis  5   Stable 2 1 cm cystic lesion within the body of the pancreas  6   Dense opacity in the left lower lobe which may be due to atelectasis versus pneumonia  Small left pleural effusion  I personally discussed the left renal and splenic findings of this study with Darnell Hoffmann on 5/3/2023 3:17 AM  Workstation performed: [id number]     IR embolization (specify vessel or site)    Result Date: 5/4/2023  Impression: Impression: Active extravasation of contrast identified at the mid left kidney  Given patient's coagulopathy, the proximal and distal branches of the left renal artery was embolized using gelfoam and coils in alternating fashion  Completion left renal arteriogram showed no further perfusion of the left kidney  Completion aortogram and selective angiogram of the left L4 lumbar artery showed no evidence of active extravasation of contrast  Workstation performed: [id number]     IR embolization (specify vessel or site)    Result Date: 5/3/2023  Impression: Impression: Active bleeding from the lower pole of the left kidney, numerous small arterial branches feeding this bleeding which is likely venous in origin  Embolization of 4 feeding branches with Gelfoam and coils The kidney is compressed and distorted by the adjacent hematoma Workstation performed: [id number]         Complications: none    Condition at Discharge: stable         Discharge instructions/Information to patient and family:   See after visit summary for information provided to patient and family  Provisions for Follow-Up Care:  See after visit summary for information related to follow-up care and any pertinent home health orders        PCP: Lyndsey Zimmerman, DO    Disposition: Home    Planned Readmission: No    Discharge Statement   I spent 35 minutes discharging the patient  This time was spent on the day of discharge  I had direct contact with the patient on the day of discharge  Additional documentation is required if more than 30 minutes were spent on discharge  Discharge Medications:  See after visit summary for reconciled discharge medications provided to patient and family

## 2023-05-15 NOTE — PLAN OF CARE
Problem: OCCUPATIONAL THERAPY ADULT  Goal: Performs self-care activities at highest level of function for planned discharge setting  See evaluation for individualized goals  Description: Treatment Interventions: ADL retraining, Functional transfer training, UE strengthening/ROM, Endurance training, Patient/family training, Equipment evaluation/education, Fine motor coordination activities, Compensatory technique education, Energy conservation          See flowsheet documentation for full assessment, interventions and recommendations  Note: Limitation: Decreased ADL status, Decreased endurance, Decreased high-level ADLs, Decreased self-care trans (dec'd IADL, dec'd functional reach, dec'd standing tolearnce, generalized deconditioning,)  Prognosis: Good  Assessment: Patient seen for OT treatment on 5/15/2023 s/p admission for Kidney capsule rupture, left, initial encounter Patient presents with active orders for OT eval and treat  Upon arrival, pt found resting in bed showing no signs of distress  Patient agreeable to OT session  Patient participated in toileting, dressing , self-care transfers, bed mobility , functional mobility and formal cognitive evaluation with intervention focus on increasing functional independence, activity tolerance, and reducing risk of falls  Laurent Chin II is showing improvements in functional mobility, transfers, and LB ADLs but is continuing to perform below baseline due to the following deficits: endurance ,  decreased muscular strength , decreased standing tolerance for self care tasks , impaired judgement and problem solving  and impaired safety awareness ; MoCA 18/30  From OT standpoint, patient would benefit from skilled intervention to maximize independence with ADLs and functional mobility  Goals remain appropriate, continue POC   At this time, recommending D/C to: return to previous environment with home health rehabilitation pending spouse able to provide assistance with IADLs upon D/C  If not, pt may require STR  Also recommend OP fitness to drive assessment and geriatrics vs neurology consult (OP) to f/u on MoCA assessment  Recommendation: Geriatric Consult (vs neurology consult following D/C to further evaluate for neurocognitive disorder d/t MoCA score)  OT Discharge Recommendation: Home with home health rehabilitation (pending spouse able to provide assistance with IADL tasks, ADL tasks as needed   If unable, pt may require STR )   Amor Escobar

## 2023-05-15 NOTE — PHYSICAL THERAPY NOTE
Physical Therapy Treatment Note    Patient's Name: Ted Layton  : 1950     05/15/23 0911   PT Last Visit   PT Visit Date 05/15/23   Note Type   Note Type Treatment for insurance authorization   Pain Assessment   Pain Assessment Tool 0-10   Pain Score 5   Pain Location/Orientation Orientation: Right  (wrist, snuffbox?)   Restrictions/Precautions   Weight Bearing Precautions Per Order No   Other Precautions Chair Alarm; Bed Alarm; Fall Risk;Pain;Cognitive   General   Chart Reviewed Yes   Response to Previous Treatment Patient with no complaints from previous session  Family/Caregiver Present No   Cognition   Orientation Level Oriented X4   Comments pt ID by wristband, name and    Subjective   Subjective pt agreeable to participate in PT intervention   Bed Mobility   Supine to Sit 4  Minimal assistance   Additional items Assist x 1; Increased time required   Sit to Supine 6  Modified independent   Additional items HOB elevated; Increased time required   Transfers   Sit to Stand 5  Supervision   Additional items Increased time required   Stand to Sit 5  Supervision   Additional items Increased time required   Additional Comments noted wrist pain during transfers   Ambulation/Elevation   Gait pattern Decreased foot clearance; Short stride   Gait Assistance 5  Supervision   Additional items Verbal cues; Assist x 1   Assistive Device None   Distance 55' x1, 200' x 1   Stair Management Assistance 4  Minimal assist  (CGA)   Additional items Verbal cues   Stair Management Technique One rail R;Alternating pattern  (alternating pattern to ascend, step to descend, pt demonstrated improved safety as compared to previous stair training)   Number of Stairs 13   Balance   Static Sitting Fair +   Dynamic Sitting Fair   Static Standing Fair -   Dynamic Standing [address],Floors 3,4, & 5 -   Activity Tolerance   Activity Tolerance Patient limited by fatigue   Nurse Made Aware RN pre/post   Assessment   Prognosis Fair   Problem List Decreased strength;Decreased endurance; Impaired balance;Decreased mobility; Decreased cognition;Pain;Obesity   Assessment Pt continues to make functional improvements, and is requiring less asssitance for bed mobility, functional transfers and ambulation today  Pt demosntrated ability to ambulate without RW with steady gait pattern  He does continue to have decreased foot clearance and short stride, moderate carryover with cues to improve  As compared to previous PT treatment session, pt demonstrated improved control and safety with stair negotiation this session as well  Did not experience LOB on stairs  He does demosntrate slightly decreased foot clearance on stairs when fatigued  Educated patient on pacing for energy conservation and safety  Overall pt is progressing well toward pt and PT goals  At this time would reccomend home with HHPT  Goals   Patient Goals none stated by pt   STG Expiration Date 05/25/23   Short Term Goal #1 pt will be able to: 1  Demonstrate ability to perform all aspects of bed mobility independently to improve functional safety  2  Perform functional transfers independently to facilitate safe return to previous living environment  3   Ambulate 150 ft with LRAD and supervision with stable vitals to improve safety with household distances and reduce fall risk  4  Improve LE strength grades by 1 to increase ease of functional mobility with transfers and gait  5  Pt will demonstrate improved balance by one grade in order to decrease risk of falls  6  Climb 7 steps with 1 HR with Sandeep to simulate entrance to home  PT Treatment Day 6   Plan   Treatment/Interventions Functional transfer training;LE strengthening/ROM; Therapeutic exercise;Cognitive reorientation;Patient/family training;Equipment eval/education; Bed mobility;Gait training;Spoke to nursing   Progress Progressing toward goals   PT Frequency 3-5x/wk Recommendation   PT Discharge Recommendation Home with home health rehabilitation   [address] [place] [address] Mobility Inpatient   Turning in Flat Bed Without Bedrails 3   Lying on Back to Sitting on Edge of Flat Bed Without Bedrails 3   Moving Bed to Chair 3   Standing Up From Chair Using Arms 4   Walk in Room 3   Climb 3-5 Stairs With Railing 3   Basic Mobility Inpatient Raw Score 19   Basic Mobility Standardized Score 42 48   Highest Level Of Mobility   JH-HLM Goal 6: Walk 10 steps or more   JH-HLM Achieved 7: Walk 25 feet or more   Education   Education Provided Mobility training;Assistive device   Patient Demonstrates acceptance/verbal understanding   End of Consult   Patient Position at End of Consult Supine;Bed/Chair alarm activated; All needs within reach   The patient's AM-PAC Basic Mobility Inpatient Short Form Raw Score is 17  A Raw score of greater than 16 suggests the patient may benefit from discharge to home  Please also refer to the recommendation of the Physical Therapist for safe discharge planning        Kanika Godfrey, PT

## 2023-05-15 NOTE — PLAN OF CARE
Problem: MOBILITY - ADULT  Goal: Maintain or return to baseline ADL function  Description: INTERVENTIONS:  -  Assess patient's ability to carry out ADLs; assess patient's baseline for ADL function and identify physical deficits which impact ability to perform ADLs (bathing, care of mouth/teeth, toileting, grooming, dressing, etc )  - Assess/evaluate cause of self-care deficits   - Assess range of motion  - Assess patient's mobility; develop plan if impaired  - Assess patient's need for assistive devices and provide as appropriate  - Encourage maximum independence but intervene and supervise when necessary  - Involve family in performance of ADLs  - Assess for home care needs following discharge   - Consider OT consult to assist with ADL evaluation and planning for discharge  - Provide patient education as appropriate  5/15/2023 1615 by Omari Lyons RN  Outcome: Adequate for Discharge  5/15/2023 1017 by Omari Lyons RN  Outcome: Progressing  Goal: Maintains/Returns to pre admission functional level  Description: INTERVENTIONS:  - Perform BMAT or MOVE assessment daily    - Set and communicate daily mobility goal to care team and patient/family/caregiver  - Collaborate with rehabilitation services on mobility goals if consulted  - Perform Range of Motion  times a day  - Reposition patient every hours    - Dangle patient times a day  - Stand patient  times a day  - Ambulate patient  times a day  - Out of bed to chair  times a day   - Out of bed for meals times a day  - Out of bed for toileting  - Record patient progress and toleration of activity level   5/15/2023 1615 by Omari Lyons RN  Outcome: Adequate for Discharge  5/15/2023 1017 by Omari Lyons RN  Outcome: Progressing     Problem: Nutrition/Hydration-ADULT  Goal: Nutrient/Hydration intake appropriate for improving, restoring or maintaining nutritional needs  Description: Monitor and assess patient's nutrition/hydration status for malnutrition  Collaborate with interdisciplinary team and initiate plan and interventions as ordered  Monitor patient's weight and dietary intake as ordered or per policy  Utilize nutrition screening tool and intervene as necessary  Determine patient's food preferences and provide high-protein, high-caloric foods as appropriate       INTERVENTIONS:  - Monitor oral intake, urinary output, labs, and treatment plans  - Assess nutrition and hydration status and recommend course of action  - Evaluate amount of meals eaten  - Assist patient with eating if necessary   - Allow adequate time for meals  - Recommend/ encourage appropriate diets, oral nutritional supplements, and vitamin/mineral supplements  - Order, calculate, and assess calorie counts as needed  - Recommend, monitor, and adjust tube feedings and TPN/PPN based on assessed needs  - Assess need for intravenous fluids  - Provide specific nutrition/hydration education as appropriate  - Include patient/family/caregiver in decisions related to nutrition  5/15/2023 1615 by Hortensia Jimenez RN  Outcome: Adequate for Discharge  5/15/2023 1017 by Hortensia Jimenez RN  Outcome: Progressing     Problem: Prexisting or High Potential for Compromised Skin Integrity  Goal: Skin integrity is maintained or improved  Description: INTERVENTIONS:  - Identify patients at risk for skin breakdown  - Assess and monitor skin integrity  - Assess and monitor nutrition and hydration status  - Monitor labs   - Assess for incontinence   - Turn and reposition patient  - Assist with mobility/ambulation  - Relieve pressure over bony prominences  - Avoid friction and shearing  - Provide appropriate hygiene as needed including keeping skin clean and dry  - Evaluate need for skin moisturizer/barrier cream  - Collaborate with interdisciplinary team   - Patient/family teaching  - Consider wound care consult   5/15/2023 1615 by Hortensia Jimenez RN  Outcome: Adequate for Discharge  5/15/2023 1017 by Berenda Boxer, RN  Outcome: Progressing     Problem: SAFETY,RESTRAINT: NV/NON-SELF DESTRUCTIVE BEHAVIOR  Goal: Remains free of harm/injury (restraint for non violent/non self-detsructive behavior)  Description: INTERVENTIONS:  - Instruct patient/family regarding restraint use   - Assess and monitor physiologic and psychological status   - Provide interventions and comfort measures to meet assessed patient needs   - Identify and implement measures to help patient regain control  - Assess readiness for release of restraint   5/15/2023 1615 by Berenda Boxer, RN  Outcome: Adequate for Discharge  5/15/2023 1017 by Berenda Boxer, RN  Outcome: Progressing  Goal: Returns to optimal restraint-free functioning  Description: INTERVENTIONS:  - Assess the patient's behavior and symptoms that indicate continued need for restraint  - Identify and implement measures to help patient regain control  - Assess readiness for release of restraint   5/15/2023 1615 by Berenda Boxer, RN  Outcome: Adequate for Discharge  5/15/2023 1017 by Berenda Boxer, RN  Outcome: Progressing     Problem: PHYSICAL THERAPY ADULT  Goal: Performs mobility at highest level of function for planned discharge setting  See evaluation for individualized goals  Description: Treatment/Interventions: Functional transfer training, LE strengthening/ROM, Therapeutic exercise, Cognitive reorientation, Patient/family training, Equipment eval/education, Bed mobility, Gait training, Spoke to nursing, Spoke to case management  Equipment Recommended: Elissa Solo       See flowsheet documentation for full assessment, interventions and recommendations  Outcome: Adequate for Discharge     Problem: OCCUPATIONAL THERAPY ADULT  Goal: Performs self-care activities at highest level of function for planned discharge setting  See evaluation for individualized goals    Description: Treatment Interventions: ADL retraining, Functional transfer training, UE strengthening/ROM, Endurance training, Patient/family training, Equipment evaluation/education, Fine motor coordination activities, Compensatory technique education, Energy conservation          See flowsheet documentation for full assessment, interventions and recommendations     Outcome: Adequate for Discharge

## 2023-05-15 NOTE — PROGRESS NOTES
"Progress Note - Geriatric Medicine   John Gregory II 72 y o  male MRN: [id number]  Unit/Bed#: S -01 Encounter: [id number]      Assessment/Plan:  1 -Cognitive decline -Patient had a MoCA performed he scored an 18 out of 30  This is of concern because the patient still driving would recommend a fitness to drive assessment because of cognition concerns  Patient also has left eye visual deficit from his residual stroke  OT raised concern regarding delayed reaction times  2 -Multiple pancreatic cystic lesions most of which communicate with dorsal pancreatic duct  -  Highly suspicious of an underlying neoplasm to be followed by GI CA 19-9 pending  3 -Hearing loss  -  Patient will need to be evaluated for hearing aids  4 -Parkinsonism -We did receive patient's dosage and this was started  5 -  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   Apparently he was cleared by ophthalmology  Subjective:   Patient would like to go home  He still needs work-up by gastroenterology  Review of Systems   Constitutional: Negative  HENT: Positive for hearing loss  Respiratory: Negative  Cardiovascular: Negative  Gastrointestinal: Negative  Endocrine: Negative  Genitourinary: Negative  Musculoskeletal: Positive for arthralgias  Skin: Negative  Neurological: Negative  Objective:     Vitals: Blood pressure 143/71, pulse 70, temperature 98 °F (36 7 °C), resp  rate 20, height 5' 10\" (1 778 m), weight 89 1 kg (196 lb 6 9 oz), SpO2 97 %  ,Body mass index is 28 18 kg/m²  No intake or output data in the 24 hours ending 05/15/23 1126    Current Medications: Reviewed    Physical Exam:   Physical Exam  HENT:      Right Ear: Ear canal and external ear normal       Left Ear: Ear canal and external ear normal    Eyes:      Pupils: Pupils are equal, round, and reactive to light  Cardiovascular:      Rate and Rhythm: Normal rate and regular rhythm        Pulses: Normal " pulses  Heart sounds: Normal heart sounds  Pulmonary:      Breath sounds: Normal breath sounds  Abdominal:      General: Bowel sounds are normal       Palpations: Abdomen is soft  Musculoskeletal:      Cervical back: Neck supple  Skin:     General: Skin is dry  Neurological:      Mental Status: He is oriented to person, place, and time  Invasive Devices     Peripheral Intravenous Line  Duration           Peripheral IV 05/13/23 Right;Ventral (anterior) Forearm 2 days                Lab, Imaging and other studies: I have personally reviewed pertinent reports

## 2023-05-15 NOTE — TELEPHONE ENCOUNTER
[facility] called stating they never heard from on call  Sent a TC to [facility] on call  Medications need to be resent to pharmacy

## 2023-05-15 NOTE — INCIDENTAL FINDINGS
The following findings require follow up:  Radiographic finding   Finding:    Large subcapsular hematoma compressing the left kidney measuring approximately 13 5 x 15 8 x 9 7 cm and up to 6 2 cm in thickness  Linear hyperdensities within the posterior aspect of the hematoma compatible with active extravasation  There is   surrounding perinephric and retroperitoneal hemorrhage  2   Vague hypodensities are seen within the superior aspect of the spleen measuring up to 4 cm and 1 9 cm respectively  These areas may reflect splenic infarction less likely laceration  There are foci of hyperdensity at the superior aspect of the spleen   which was present on prior exam and likely represent a hemangioma versus calcification  3   Flattening of the IVC compatible with hypovolemia  4   Cholelithiasis without evidence of cholecystitis  5   Stable 2 1 cm cystic lesion within the body of the pancreas  6   Dense opacity in the left lower lobe which may be due to atelectasis versus pneumonia   Small left pleural effusion       Follow up required: PCP and specialist as recommended   Follow up should be done within 2 week(s)    Please notify the following clinician to assist with the follow up:   Dr Melodie Johnson

## 2023-05-15 NOTE — OCCUPATIONAL THERAPY NOTE
Occupational Therapy Treatment Note     Patient Name: Peg Dorsey Date: 5/15/2023  Problem List  Principal Problem:    Kidney capsule rupture, left, initial encounter  Active Problems:    Type 2 diabetes mellitus (HCC)    CAD (coronary artery disease)    Seizure disorder (HCC)    Leukemia (HCC)    Splenic hemorrhage    Paroxysmal atrial fibrillation (HCC)    Coagulopathy (HCC)    Acute respiratory insufficiency    Acute blood loss anemia    Hyperbilirubinemia        05/15/23 0900   OT Last Visit   OT Visit Date 05/15/23   Note Type   Note Type Treatment for insurance authorization   Pain Assessment   Pain Assessment Tool 0-10   Pain Score 5   Pain Location/Orientation Orientation: Right;Location: Arm  (wrist)   Pain Onset/Description Onset: Ongoing; Descriptor: Sore   Effect of Pain on Daily Activities limits use during transfers   Hospital Pain Intervention(s) Cold applied;Repositioned; Ambulation/increased activity   Multiple Pain Sites No   Restrictions/Precautions   Weight Bearing Precautions Per Order No   Other Precautions Chair Alarm; Bed Alarm; Fall Risk;Pain;Cognitive  (pj)   Lifestyle   Autonomy PTA, pt (I) with all ADLs and IADLs   Reciprocal Relationships spouse, Onofretono Leonardoigan   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   ADL   Where Assessed   (bathroom)   [address]  Supervision/Setup   UB Dressing Deficit Increased time to complete;Setup;Supervision/safety   UB Dressing Comments donned/doffed gown   LB Dressing Assistance 5  Supervision/Setup   LB Dressing Deficit Increased time to complete;Supervision/safety  (rest breaks needed f/t fatigue )   LB Dressing Comments donned / doffed BL socks (increased effort to don)  thread BLE through pants and pull over hips, snapped button with supervision/increased time   no LOB during functional tasks in stance   Toileting Assistance  5  Supervision/Setup   Toileting Deficit Increased time to "complete;Grab bar use;Supervison/safety   Toileting Comments (S)  Pt reporting RPE: 8/10 following completion of ADL tasks  Bed Mobility   Supine to Sit 6  Modified independent   Additional items HOB elevated   Sit to Supine 6  Modified independent   Additional items HOB elevated   Additional Comments sat EOB with supervision x 20 min for completion of MoCA   Transfers   Sit to Stand 5  Supervision   Additional items Increased time required;Verbal cues   Stand to Sit 5  Supervision   Additional items Increased time required;Verbal cues   Toilet transfer 4  Minimal assistance  (CGA)   Additional items Increased time required;Standard toilet;Verbal cues  ((pt has raised toilet at home))   Additional Comments no AD used during transfers  Functional Mobility   Functional Mobility 5  Supervision   Additional Comments functional mobility bed <>bathroom without AD  no LOB or self steadying on objects   Additional items   (no AD)   Toilet Transfers   Toilet Transfer Type To and from   Toilet Transfer to Standard toilet   Toilet Transfer Technique Ambulating   Toilet Transfers Contact guard   Toilet Transfers Comments use of GBs; pt has RT at home   Subjective   Subjective Pt reports feeling well, only complaint is new onset R wrist pain   Cognition   Overall Cognitive Status Impaired   Arousal/Participation Alert; Cooperative   Attention Attends with cues to redirect   Orientation Level Oriented X4   Memory Decreased short term memory  (0/5 in memory section of MoCA, 8/15 memory index score)   Following Commands Follows one step commands without difficulty   Comments Pt agreeable to OT session and administration of MoCA  continues to demonstrate limiting insight to deficits and safety awareness  Attents to tasks appropriately     Vision   Vision Comments Pt reporting residual visual deficits following CVA in L eye, \"It looks like I'm looking through a straw\"   Activity Tolerance   Activity Tolerance Patient limited by " fatigue   Medical Staff Made Aware PT Kandice [employer] student, CM, RN Gloria   Assessment   Assessment Patient seen for OT treatment on 5/15/2023 s/p admission for Kidney capsule rupture, left, initial encounter Patient presents with active orders for OT eval and treat  Upon arrival, pt found resting in bed showing no signs of distress  Patient agreeable to OT session  Patient participated in toileting, dressing , self-care transfers, bed mobility , functional mobility and formal cognitive evaluation with intervention focus on increasing functional independence, activity tolerance, and reducing risk of falls  Maddison Cliffordmers II is showing improvements in functional mobility, transfers, and LB ADLs but is continuing to perform below baseline due to the following deficits: endurance ,  decreased muscular strength , decreased standing tolerance for self care tasks , impaired judgement and problem solving  and impaired safety awareness ; MoCA   From OT standpoint, patient would benefit from skilled intervention to maximize independence with ADLs and functional mobility  Initial goals , new goals written and POC updated  At this time, recommending D/C to: return to previous environment with home health rehabilitation pending spouse able to provide assistance with IADLs upon D/C  If not, pt may require STR  Also recommend OP fitness to drive assessment and geriatrics vs neurology consult (OP) to f/u on MoCA assessment  Plan   Treatment Interventions ADL retraining;Functional transfer training;Continued evaluation;Equipment evaluation/education;Patient/family training; Compensatory technique education  (MoCA)   Goal Expiration Date 23  (Goals , POC reviewed   See new goals below)   OT Treatment Day 3   OT Frequency 3-5x/wk   Recommendation   Recommendation Geriatric Consult  (vs neurology consult following D/C to further evaluate for neurocognitive disorder d/t MoCA score)   OT Discharge Recommendation Home with home health rehabilitation  (pending spouse able to provide assistance with IADL tasks, ADL tasks as needed  If unable, pt may require STR )   Additional Comments  (S)  Recommend fitness to drive assessment following  D/C   AM-PAC Daily Activity Inpatient   Lower Body Dressing 3   Bathing 3   Toileting 3   Upper Body Dressing 3   Grooming 3   Eating 4   Daily Activity Raw Score 19   Daily Activity Standardized Score (Calc for Raw Score >=11) 40 22   AM-PAC Applied Cognition Inpatient   Following a Speech/Presentation 3   Understanding Ordinary Conversation 4   Taking Medications 3   Remembering Where Things Are Placed or Put Away 3   Remembering List of 4-5 Errands 2   Taking Care of Complicated Tasks 3   Applied Cognition Raw Score 18   Applied Cognition Standardized Score 38 07   MOCA   Version 8 1  (CERTIFIED RATER ID: [id number])   Visuopatial/Executive 3   Naming 3   Memory 0   Attention: Digits 2   Attention: Letters 0   Attention: Serial 3   Language: Repeat 0   Language: Fluency 0   Abstraction 2   Delayed Recall 0  (Memory index score: 8/15)   Orientation 5   Does patient have less than or equal to 12 years of education? 0  (college courses)   MOCA Total Score 18   MOCA Comments (S)  Pt engaged in [place] Cognitive Assessment (MoCA) version 1  Pt scored overall 18/30 indicative of mild neurocognitive impairments  Pt noted w/ areas of deficit in visuospatial/executive functioning skills, short term memory, language fluency  Pt educated on functional implications of MoCA v1 score and acknowledged reception of such  Recommend f/u geriatric vs neurology consult following D/C for further evaluation of dementing disorder, as well as fitness to drive assessment via OP OT d/t cognitive, visual, and concern for delayed reaction time  End of Consult   Education Provided Yes   Patient Position at End of Consult Supine;Bed/Chair alarm activated; All needs within reach   Nurse Communication Nurse aware of consult       Goals updated:     Pt will complete UB ADLs Independent  as needed for increased ADL independence within 10 days  Pt will complete LB ADLs Mod independent   with use of LHAE as needed for increased ADL independence within 10 days  Pt will complete toileting Mod independent   with use of DME for increased ADL independence within 10 days  Pt will demonstrate proper body mechanics to complete self-care transfers and functional mobility with Mod independent  and use of AD PRN for increased safety and functional independence within 10 days  Pt will demonstrate standing tolerance of 8 min with Supervision and use of AD PRN for increased activity tolerance during ADL/IADL tasks within 10 days  Pt will demonstrate proper body mechanics and fall prevention strategies during 100% of tx sessions for increased safety awareness during ADL/IADLs    Pt will receive education on use of compensatory memory strategies for increased safety and independent with IADL tasks  Pt will participate in ongoing cognitive assessments to assist with safe D/C planning and supervision/assistance recommendations  Pt will verbalize and demonstrate understanding of energy conservation/deep breathing techniques for increased activity tolerance and endurance during meaningful activities     Brittany Chau

## 2023-05-15 NOTE — CASE MANAGEMENT
Case Management Discharge Planning Note    Patient name Chris Diane  Location S /S -69 MRN [id number]  : 1950 Date 5/15/2023       Current Admission Date: 5/3/2023  Current Admission Diagnosis:Kidney capsule rupture, left, initial encounter   Patient Active Problem List    Diagnosis Date Noted   • Hyperbilirubinemia 2023   • Acute blood loss anemia 2023   • Elevated troponin 2023   • Coagulopathy ([facility] [address] ) 2023   • Acute respiratory insufficiency 2023   • Kidney capsule rupture, left, initial encounter 2023   • Type 2 diabetes mellitus ([facility] [address] ) 2023   • CAD (coronary artery disease) 2023   • Seizure disorder ([facility] [address] ) 2023   • Leukemia ([facility] [address] ) 2023   • Splenic hemorrhage 2023   • Paroxysmal atrial fibrillation ([facility] [address] ) 2023      LOS (days): 12  Geometric Mean LOS (GMLOS) (days):   Days to GMLOS:     OBJECTIVE:  Risk of Unplanned Readmission Score: 21 56         Current admission status: Inpatient   Preferred Pharmacy:   [facility] [address]   [address] [address]  Phone: [phone] Fax: [phone]    [facility] [address] [address]  Phone: [phone] Fax: [phone]    Primary Care Provider: Sabrina Silveira DO    Primary Insurance: [facility] REP  Secondary Insurance:     DISCHARGE DETAILS:          [facility]  Discharge Destination Plan[de-identified] Home with Hema at Discharge : Family           ETA of Transport (Date): 05/15/23  ETA of Transport (Time): 36     Transfer Mode: Wheelchair  Accompanied by: Family member     IMM Given (Date):: 05/15/23  IMM Given to[de-identified] Patient     IMM reviewed with patient, patient agrees with discharge determination  CM informed patient and spouse that the insurance is requesting a peer to peer   Patient's wife requesting he returns home and aware [facility] will provide services at home  No other needs at this time   CM informed the facility and team

## 2023-05-15 NOTE — TELEPHONE ENCOUNTER
Notified by health call regarding an issue with medications for Mr Sushma Grimes  I called and spoke with Mr  Monica Wong  He tried to explain some confusion regarding where his medications were to be sent  Patient states he does not need any new medications this evening  I instructed him to call the trauma office first thing in the morning on May 16  He was under the trauma service while admitted  I then called the respective [facility] and spoke to the pharmacist who was asking me questions regarding his Diamox  As I cannot advise her on this medication I did explain that he will discuss this with the trauma office who discharged him earlier today

## 2023-05-15 NOTE — CASE MANAGEMENT
Case Management Discharge Planning Note    Patient name Cayetano Juarez  Location S /S -94 MRN [id number]  : 1950 Date 5/15/2023       Current Admission Date: 5/3/2023  Current Admission Diagnosis:Kidney capsule rupture, left, initial encounter   Patient Active Problem List    Diagnosis Date Noted   • Hyperbilirubinemia 2023   • Acute blood loss anemia 2023   • Elevated troponin 2023   • Coagulopathy ([facility] ) 2023   • Acute respiratory insufficiency 2023   • Kidney capsule rupture, left, initial encounter 2023   • Type 2 diabetes mellitus ([facility] ) 2023   • CAD (coronary artery disease) 2023   • Seizure disorder ([facility] ) 2023   • Leukemia ([facility] ) 2023   • Splenic hemorrhage 2023   • Paroxysmal atrial fibrillation ([facility] ) 2023      LOS (days): 12  Geometric Mean LOS (GMLOS) (days):   Days to GMLOS:     OBJECTIVE:  Risk of Unplanned Readmission Score: 19 87         Current admission status: Inpatient   Preferred Pharmacy:   [facility], [address] [place] [address]  Phone: [phone] Fax: [phone]    Primary Care Provider: Sharda Deluna DO    Primary Insurance: [facility]  Secondary Insurance:     DISCHARGE DETAILS:    Discharge planning discussed with[de-identified] pt and his wife, Alpha Ham of Choice: Yes  Comments - Freedom of Choice: return home with  VNA    Contacts  Patient Contacts: Corine Mckeon - spouse  Relationship to Patient[de-identified] Family  Contact Method: Phone  Phone Number: See face sheet  Reason/Outcome: Discharge Planning, Continuity of Care, Emergency [address]         Is the patient interested in [facility] at discharge?: Yes  [address] requested[de-identified] Nursing, Physical Therapy, Occupational [address] Name[de-identified] [address] [facility] Provider[de-identified] PCP  Home Health Services Needed[de-identified] Strengthening/Theraputic Exercises to Improve Function, Gait/ADL Training, Evaluate Functional Status and Safety, Other (comment)  Homebound Criteria Met[de-identified] Requires the Assistance of Another Person for Safe Ambulation or to Leave the Home  Supporting Clincal Findings[de-identified] Limited Endurance    DME Referral Provided  Referral made for DME?: No    Other Referral/Resources/Interventions Provided:  Interventions: DME, HHC  Referral Comments: CM met with pt and his wife at bedside  CM notified them that CM is still waiting on a determination from his insurance for [facility]  CM did review PT/OT notes and that they are now recommending Home with [facility]  Pt and his wife are in agreement with the [facility] recommendation and preference is to return home  Pt and his wife are aware that [facility] is set up  CM discussed RW  At this time they do not feel pt would need one and decline CM ordering it  CM did provide pt wife with CM Contact information if they change their mind once pt is home  Pt wife requesting an update from the Trauma team and would like to take pt home today if cleared  CM notifed Trauma AP of same      Treatment Team Recommendation: Home with [address] [facility]  Discharge Destination Plan[de-identified] Home with [address] [facility]

## 2023-05-16 ENCOUNTER — TRANSITIONAL CARE MANAGEMENT (OUTPATIENT)
Dept: INTERNAL MEDICINE CLINIC | Facility: CLINIC | Age: 73
End: 2023-05-16

## 2023-05-16 NOTE — UTILIZATION REVIEW
NOTIFICATION OF ADMISSION DISCHARGE   This is a Notification of Discharge from [facility]  Please be advised that this patient has been discharge from our facility  Below you will find the admission and discharge date and time including the patient’s disposition  UTILIZATION REVIEW CONTACT:  Berto Elliott MA  Utilization   Network Utilization Review Department  Phone: [phone] x carefully listen to the prompts  All voicemails are confidential   Email: [email or web address]     ADMISSION INFORMATION  PRESENTATION DATE: 5/3/2023  1:10 AM  OBERVATION ADMISSION DATE:   INPATIENT ADMISSION DATE: 5/3/23  3:01 AM   DISCHARGE DATE: 5/15/2023  4:43 PM   DISPOSITION:Home/Self Care    IMPORTANT INFORMATION:  Send all requests for admission clinical reviews, approved or denied determinations and any other requests to dedicated fax number below belonging to the campus where the patient is receiving treatment   List of dedicated fax numbers:  [address] DENIALS (Administrative/Medical Necessity) [phone]   [address] (Maternity/NICU/Pediatrics) [phone]   [facility] [phone]   [facility] 87 [phone]   [address] [phone]   220 [facility] [phone] [facility] [phone]   [address] [place], [place] [address] [address] [phone]   [facility]  [phone]   [facility] [phone]   [facility] [address] [facility] [phone]

## 2023-05-17 ENCOUNTER — HOME CARE VISIT (OUTPATIENT)
Dept: HOME HEALTH SERVICES | Facility: HOME HEALTHCARE | Age: 73
End: 2023-05-17

## 2023-05-17 VITALS
OXYGEN SATURATION: 97 % | RESPIRATION RATE: 16 BRPM | DIASTOLIC BLOOD PRESSURE: 84 MMHG | HEART RATE: 85 BPM | SYSTOLIC BLOOD PRESSURE: 152 MMHG | TEMPERATURE: 97 F

## 2023-05-17 LAB
ALP BONE CFR SERPL: 48 % (ref 12–68)
ALP INTEST CFR SERPL: 1 % (ref 0–18)
ALP LIVER CFR SERPL: 51 % (ref 13–88)
ALP SERPL-CCNC: 242 IU/L (ref 44–121)

## 2023-05-17 NOTE — CASE COMMUNICATION
Please add PCP   DO Karine [address]sec[address] , [address]   [place], [address]   [phone] (Work)   [phone] (Fax)

## 2023-05-17 NOTE — CASE COMMUNICATION
[facility] has admitted your patient to [address] [place] [address] service with the following disciplines:      SN, PT and OT  This report is informational only, no responses is needed  Primary focus of home health care:   Patient stated goals of care: improve mobility and endurance  Anticipated visit pattern: 2w1 3w1 2w1 and next visit date: 5/19/23 Left Kidney subcapsular hemorrhage 5/03 s/p IR embolization x 2 [facility]     Significant clin ical findings: TC to PCP Billy Ding [phone] left VM to request VO for a BMP and CBC to follow up on kidney function and anemia  Also to report: Patient reports lightheadedness with walking HR increases to 110 with ambulation  Patient gets SOB with talking  /84 HR 85 at rest  Urine is dark per patient  Per PHQ-9 patient is 21 and has depression  Scab on right posterior thigh and left upper back, leaving open to air educat ed patient to wash with soap a water daily  Thank you for allowing us to participate in the care of your patient        National Brandon Lam RN

## 2023-05-18 ENCOUNTER — HOME CARE VISIT (OUTPATIENT)
Dept: HOME HEALTH SERVICES | Facility: HOME HEALTHCARE | Age: 73
End: 2023-05-18

## 2023-05-18 VITALS — HEART RATE: 60 BPM | OXYGEN SATURATION: 99 % | SYSTOLIC BLOOD PRESSURE: 148 MMHG | DIASTOLIC BLOOD PRESSURE: 90 MMHG

## 2023-05-19 ENCOUNTER — HOME CARE VISIT (OUTPATIENT)
Dept: HOME HEALTH SERVICES | Facility: HOME HEALTHCARE | Age: 73
End: 2023-05-19

## 2023-05-19 VITALS
HEART RATE: 70 BPM | TEMPERATURE: 97.3 F | RESPIRATION RATE: 17 BRPM | OXYGEN SATURATION: 97 % | SYSTOLIC BLOOD PRESSURE: 136 MMHG | DIASTOLIC BLOOD PRESSURE: 62 MMHG

## 2023-05-22 ENCOUNTER — HOME CARE VISIT (OUTPATIENT)
Dept: HOME HEALTH SERVICES | Facility: HOME HEALTHCARE | Age: 73
End: 2023-05-22

## 2023-05-22 VITALS
HEART RATE: 91 BPM | OXYGEN SATURATION: 99 % | TEMPERATURE: 97.4 F | SYSTOLIC BLOOD PRESSURE: 124 MMHG | RESPIRATION RATE: 18 BRPM | DIASTOLIC BLOOD PRESSURE: 82 MMHG

## 2023-05-23 ENCOUNTER — HOME CARE VISIT (OUTPATIENT)
Dept: HOME HEALTH SERVICES | Facility: HOME HEALTHCARE | Age: 73
End: 2023-05-23

## 2023-05-23 NOTE — CASE COMMUNICATION
Faxed Dr Demi Cobb regarding the delay in initiating PT within 7 days  Anticipated date to begin is 5/30/23 or sooner  PT to contact the patient to schedule the visit

## 2023-05-25 ENCOUNTER — HOME CARE VISIT (OUTPATIENT)
Dept: HOME HEALTH SERVICES | Facility: HOME HEALTHCARE | Age: 73
End: 2023-05-25

## 2023-05-25 VITALS
RESPIRATION RATE: 18 BRPM | SYSTOLIC BLOOD PRESSURE: 134 MMHG | OXYGEN SATURATION: 99 % | TEMPERATURE: 97.6 F | HEART RATE: 81 BPM | DIASTOLIC BLOOD PRESSURE: 62 MMHG

## 2023-06-15 ENCOUNTER — ANESTHESIA EVENT (OUTPATIENT)
Dept: GASTROENTEROLOGY | Facility: HOSPITAL | Age: 73
End: 2023-06-15

## 2023-06-15 ENCOUNTER — TELEPHONE (OUTPATIENT)
Dept: HEMATOLOGY ONCOLOGY | Facility: CLINIC | Age: 73
End: 2023-06-15

## 2023-06-15 ENCOUNTER — HOSPITAL ENCOUNTER (OUTPATIENT)
Dept: GASTROENTEROLOGY | Facility: HOSPITAL | Age: 73
Setting detail: OUTPATIENT SURGERY
End: 2023-06-15
Payer: COMMERCIAL

## 2023-06-15 ENCOUNTER — ANESTHESIA (OUTPATIENT)
Dept: GASTROENTEROLOGY | Facility: HOSPITAL | Age: 73
End: 2023-06-15

## 2023-06-15 VITALS
HEART RATE: 60 BPM | DIASTOLIC BLOOD PRESSURE: 76 MMHG | BODY MASS INDEX: 27.26 KG/M2 | RESPIRATION RATE: 16 BRPM | SYSTOLIC BLOOD PRESSURE: 130 MMHG | TEMPERATURE: 97.6 F | OXYGEN SATURATION: 95 % | WEIGHT: 190 LBS

## 2023-06-15 DIAGNOSIS — K86.9 LESION OF PANCREAS: ICD-10-CM

## 2023-06-15 LAB — GLUCOSE SERPL-MCNC: 106 MG/DL (ref 65–140)

## 2023-06-15 PROCEDURE — 43242 EGD US FINE NEEDLE BX/ASPIR: CPT | Performed by: INTERNAL MEDICINE

## 2023-06-15 PROCEDURE — 82948 REAGENT STRIP/BLOOD GLUCOSE: CPT

## 2023-06-15 PROCEDURE — C1889 IMPLANT/INSERT DEVICE, NOC: HCPCS

## 2023-06-15 PROCEDURE — 88341 IMHCHEM/IMCYTCHM EA ADD ANTB: CPT | Performed by: STUDENT IN AN ORGANIZED HEALTH CARE EDUCATION/TRAINING PROGRAM

## 2023-06-15 PROCEDURE — 88112 CYTOPATH CELL ENHANCE TECH: CPT | Performed by: STUDENT IN AN ORGANIZED HEALTH CARE EDUCATION/TRAINING PROGRAM

## 2023-06-15 PROCEDURE — 88305 TISSUE EXAM BY PATHOLOGIST: CPT | Performed by: PATHOLOGY

## 2023-06-15 PROCEDURE — 43239 EGD BIOPSY SINGLE/MULTIPLE: CPT | Performed by: INTERNAL MEDICINE

## 2023-06-15 PROCEDURE — 88305 TISSUE EXAM BY PATHOLOGIST: CPT | Performed by: STUDENT IN AN ORGANIZED HEALTH CARE EDUCATION/TRAINING PROGRAM

## 2023-06-15 PROCEDURE — 88342 IMHCHEM/IMCYTCHM 1ST ANTB: CPT | Performed by: STUDENT IN AN ORGANIZED HEALTH CARE EDUCATION/TRAINING PROGRAM

## 2023-06-15 RX ORDER — EPHEDRINE SULFATE 50 MG/ML
INJECTION INTRAVENOUS AS NEEDED
Status: DISCONTINUED | OUTPATIENT
Start: 2023-06-15 | End: 2023-06-15

## 2023-06-15 RX ORDER — FENTANYL CITRATE 50 UG/ML
INJECTION, SOLUTION INTRAMUSCULAR; INTRAVENOUS AS NEEDED
Status: DISCONTINUED | OUTPATIENT
Start: 2023-06-15 | End: 2023-06-15

## 2023-06-15 RX ORDER — SODIUM CHLORIDE 9 MG/ML
INJECTION, SOLUTION INTRAVENOUS CONTINUOUS PRN
Status: DISCONTINUED | OUTPATIENT
Start: 2023-06-15 | End: 2023-06-15

## 2023-06-15 RX ORDER — PROPOFOL 10 MG/ML
INJECTION, EMULSION INTRAVENOUS AS NEEDED
Status: DISCONTINUED | OUTPATIENT
Start: 2023-06-15 | End: 2023-06-15

## 2023-06-15 RX ORDER — PROPOFOL 10 MG/ML
INJECTION, EMULSION INTRAVENOUS CONTINUOUS PRN
Status: DISCONTINUED | OUTPATIENT
Start: 2023-06-15 | End: 2023-06-15

## 2023-06-15 RX ORDER — LIDOCAINE HYDROCHLORIDE 20 MG/ML
INJECTION, SOLUTION EPIDURAL; INFILTRATION; INTRACAUDAL; PERINEURAL AS NEEDED
Status: DISCONTINUED | OUTPATIENT
Start: 2023-06-15 | End: 2023-06-15

## 2023-06-15 RX ADMIN — PROPOFOL 100 MG: 10 INJECTION, EMULSION INTRAVENOUS at 09:25

## 2023-06-15 RX ADMIN — LIDOCAINE HYDROCHLORIDE 90 MG: 20 INJECTION, SOLUTION EPIDURAL; INFILTRATION; INTRACAUDAL; PERINEURAL at 09:25

## 2023-06-15 RX ADMIN — FENTANYL CITRATE 25 MCG: 50 INJECTION, SOLUTION INTRAMUSCULAR; INTRAVENOUS at 09:25

## 2023-06-15 RX ADMIN — EPHEDRINE SULFATE 5 MG: 50 INJECTION INTRAVENOUS at 09:27

## 2023-06-15 RX ADMIN — FENTANYL CITRATE 25 MCG: 50 INJECTION, SOLUTION INTRAMUSCULAR; INTRAVENOUS at 09:22

## 2023-06-15 RX ADMIN — SODIUM CHLORIDE: 0.9 INJECTION, SOLUTION INTRAVENOUS at 09:21

## 2023-06-15 RX ADMIN — PROPOFOL 50 MCG/KG/MIN: 10 INJECTION, EMULSION INTRAVENOUS at 09:25

## 2023-06-15 NOTE — ANESTHESIA POSTPROCEDURE EVALUATION
Post-Op Assessment Note    CV Status:  Stable  Pain Score: 0    Pain management: adequate     Mental Status:  Alert   Hydration Status:  Stable   PONV Controlled:  Controlled   Airway Patency:  Patent      Post Op Vitals Reviewed: Yes      Staff: Anesthesiologist, CRNA         No notable events documented      /76 (06/15/23 1017)    Temp      Pulse 60 (06/15/23 1017)   Resp 16 (06/15/23 1017)    SpO2 95 % (06/15/23 1017)

## 2023-06-15 NOTE — ANESTHESIA PREPROCEDURE EVALUATION
Procedure:  ENDOSCOPIC ULTRASOUND (UPPER)    Relevant Problems   CARDIO   (+) CAD (coronary artery disease)   (+) Paroxysmal atrial fibrillation (HCC)      ENDO   (+) Type 2 diabetes mellitus (HCC)      /RENAL   (+) Kidney capsule rupture, left, initial encounter      HEMATOLOGY   (+) Acute blood loss anemia   (+) Coagulopathy (HCC)      NEURO/PSYCH   (+) Seizure disorder (HCC)      Other   (+) Leukemia (HCC)   (+) Splenic hemorrhage        Physical Exam    Airway    Mallampati score: I  TM Distance: >3 FB  Neck ROM: full     Dental       Cardiovascular  Cardiovascular exam normal    Pulmonary  Pulmonary exam normal     Other Findings        Anesthesia Plan  ASA Score- 3     Anesthesia Type- IV sedation with anesthesia with ASA Monitors  Additional Monitors:   Airway Plan:           Plan Factors-Exercise tolerance (METS): >4 METS  Chart reviewed  EKG reviewed  Imaging results reviewed  Existing labs reviewed  Patient summary reviewed  Induction- intravenous  Postoperative Plan- Plan for postoperative opioid use  Planned trial extubation    Informed Consent- Anesthetic plan and risks discussed with patient  I personally reviewed this patient with the CRNA  Discussed and agreed on the Anesthesia Plan with the CRNA  Liban Odonnell

## 2023-06-22 PROCEDURE — 88112 CYTOPATH CELL ENHANCE TECH: CPT | Performed by: STUDENT IN AN ORGANIZED HEALTH CARE EDUCATION/TRAINING PROGRAM

## 2023-06-22 PROCEDURE — 88305 TISSUE EXAM BY PATHOLOGIST: CPT | Performed by: STUDENT IN AN ORGANIZED HEALTH CARE EDUCATION/TRAINING PROGRAM

## 2023-06-22 PROCEDURE — 88305 TISSUE EXAM BY PATHOLOGIST: CPT | Performed by: PATHOLOGY

## 2023-06-22 PROCEDURE — 88341 IMHCHEM/IMCYTCHM EA ADD ANTB: CPT | Performed by: STUDENT IN AN ORGANIZED HEALTH CARE EDUCATION/TRAINING PROGRAM

## 2023-06-22 PROCEDURE — 88342 IMHCHEM/IMCYTCHM 1ST ANTB: CPT | Performed by: STUDENT IN AN ORGANIZED HEALTH CARE EDUCATION/TRAINING PROGRAM

## 2023-06-23 ENCOUNTER — NURSE TRIAGE (OUTPATIENT)
Age: 73
End: 2023-06-23

## 2023-06-23 PROBLEM — K86.2 PANCREATIC CYST: Status: ACTIVE | Noted: 2023-06-23

## 2023-06-23 NOTE — TELEPHONE ENCOUNTER
----- Message from Ashleigh Nye sent at 6/23/2023  2:18 PM EDT -----  Pts wife called in requesting to speak to someone about the EUS results

## 2023-06-27 ENCOUNTER — CONSULT (OUTPATIENT)
Dept: SURGICAL ONCOLOGY | Facility: CLINIC | Age: 73
End: 2023-06-27
Payer: COMMERCIAL

## 2023-06-27 VITALS
RESPIRATION RATE: 16 BRPM | DIASTOLIC BLOOD PRESSURE: 78 MMHG | BODY MASS INDEX: 27.69 KG/M2 | OXYGEN SATURATION: 97 % | TEMPERATURE: 98.4 F | WEIGHT: 193.4 LBS | SYSTOLIC BLOOD PRESSURE: 126 MMHG | HEART RATE: 62 BPM | HEIGHT: 70 IN

## 2023-06-27 DIAGNOSIS — K86.9 LESION OF PANCREAS: Primary | ICD-10-CM

## 2023-06-27 PROCEDURE — 99205 OFFICE O/P NEW HI 60 MIN: CPT | Performed by: SURGERY

## 2023-06-27 RX ORDER — RIVAROXABAN 20 MG/1
TABLET, FILM COATED ORAL
COMMUNITY
Start: 2023-05-11

## 2023-06-27 RX ORDER — METFORMIN HYDROCHLORIDE 500 MG/1
1 TABLET, EXTENDED RELEASE ORAL 2 TIMES DAILY WITH MEALS
COMMUNITY
Start: 2023-04-21 | End: 2024-04-20

## 2023-06-27 RX ORDER — CLOPIDOGREL BISULFATE 75 MG/1
1 TABLET ORAL DAILY
COMMUNITY

## 2023-06-27 RX ORDER — DOXYCYCLINE HYCLATE 100 MG/1
CAPSULE ORAL
COMMUNITY
Start: 2023-06-10

## 2023-06-27 RX ORDER — BLOOD SUGAR DIAGNOSTIC
STRIP MISCELLANEOUS
COMMUNITY
Start: 2023-06-06

## 2023-06-27 RX ORDER — LISINOPRIL 5 MG/1
1 TABLET ORAL DAILY
COMMUNITY

## 2023-06-27 RX ORDER — CALCIUM CARBONATE/VITAMIN D3 500MG-5MCG
TABLET ORAL
COMMUNITY
Start: 2023-04-21

## 2023-06-27 NOTE — PROGRESS NOTES
Surgical Oncology Consult       [address] [facility] CAN[facility] [place]  [address] [facility] [address]  [phone]    Wolm Dance II  1950  [id number]  [address] [facility] [place]  [address] [facility] [address] [phone]    Diagnoses and all orders for this visit:    Lesion of pancreas  -     Ambulatory Referral to Surgical Oncology  -     MRI abdomen w wo contrast and mrcp; Future  -     BUN; Future  -     Creatinine, serum; Future  -     Ambulatory Referral to Oncology Genetics; Future    Other orders  -     OYSCO 500 + D 500-5 MG-MCG TABS; TAKE 1 TABLET BY MOUTH DAILY  START 04/21/23  -     OneTouch Verio test strip  -     doxycycline hyclate (VIBRAMYCIN) 100 mg capsule  -     clopidogrel (Plavix) 75 mg tablet; Take 1 tablet by mouth daily  -     metFORMIN (GLUCOPHAGE-XR) 500 mg 24 hr tablet; Take 1 tablet by mouth 2 (two) times a day with meals  -     lisinopril (ZESTRIL) 5 mg tablet; Take 1 tablet by mouth daily  -     Xarelto 20 MG tablet        Chief Complaint   Patient presents with   • New Patient Visit       Return in about 1 month (around 7/27/2023) for Office Visit, Imaging - See orders  Oncology History    No history exists  History of Present Illness: 31-year-old male who was recently diagnosed with an acute left kidney bleed  He underwent multiple imaging studies as well as embolization twice  CT in May 2021 at an outside institution revealed pancreas cyst   He and his wife were not aware of these results  Follow-up CT revealed a 4 cm hypodensity in the spleen as well as a second 1 9 cm hypodensity in the superior aspect of the spleen  There was a 2 1 cm cystic lesion in the body of the pancreas that appears stable  CTA from the same date on May 3, 2023 revealed a perinephric hematoma that was status post left renal artery embolization    Follow-up MRI on May 9, 2023 revealed multiple pancreas lesions which appear to communicate with the main pancreatic duct  The main pancreatic duct was normal   The largest lesion was 2 5 cm in the body  There was suggestion of nodularity  There were other subcentimeter lesions seen in the distal body of the pancreas  I personally reviewed his films  EUS on Al[place] 15, 2023 revealed a cyst with irregular margins in the body of the pancreas  This measured 3 6 x 3 2 cm  There were other smaller cysts throughout the pancreas  CEA and the cyst fluid was 6,366 ng/mL, amylase was 6,2002 U/L, glucose in the cyst fluid was less than 4 3 mg/dL  Cyst fluid had scant clusters of mucinous cells  There is no evidence of obvious nodularity seen  He does have a family history of pancreas cancer in her mother in her early [de-identified]  He currently denies any abdominal pain, nausea or vomiting  No early satiety  No personal history of pancreatitis, although he does have gallstones  Review of Systems  Complete ROS Surg Onc:   Constitutional: The patient denies new or recent history of general fatigue, no recent weight loss, no change in appetite  Eyes: No complaints of visual problems, no scleral icterus  ENT: no complaints of ear pain, no hoarseness, no difficulty swallowing,  no tinnitus and no new masses in head, oral cavity, or neck  Cardiovascular: No complaints of chest pain, no palpitations, no ankle edema  Respiratory: No complaints of shortness of breath, no cough  Gastrointestinal: No complaints of jaundice, no bloody stools, no pale stools  Genitourinary: No complaints of dysuria, no hematuria, no nocturia, no frequent urination, no urethral discharge  Musculoskeletal: No complaints of weakness, paralysis, joint stiffness or arthralgias  Integumentary: No complaints of rash, no new lesions  Neurological: No complaints of convulsions, no seizures, no dizziness     Hematologic/Lymphatic: No complaints of easy bruising  Endocrine:  No hot or cold intolerance  No polydipsia, polyphagia, or polyuria  Allergy/immunology:  No environmental allergies  No food allergies  Not immunocompromised  Skin:  No pallor or rash  No wound  Patient Active Problem List   Diagnosis   • Kidney capsule rupture, left, initial encounter   • Type 2 diabetes mellitus (HCC)   • CAD (coronary artery disease)   • Seizure disorder (HCC)   • Leukemia (HCC)   • Splenic hemorrhage   • Paroxysmal atrial fibrillation (HCC)   • Elevated troponin   • Coagulopathy (HCC)   • Acute respiratory insufficiency   • Acute blood loss anemia   • Hyperbilirubinemia   • Pancreatic cyst     Past Medical History:   Diagnosis Date   • CAD (coronary artery disease)    • DM (diabetes mellitus) ([facility] )    • Leukemia ([facility] )    • [facility] )    • Stroke ([facility] )     partial vision left eye     Past Surgical History:   Procedure Laterality Date   • BACK SURGERY     • CORONARY ARTERY BYPASS GRAFT     • HERNIA REPAIR     • IR EMBOLIZATION (SPECIFY VESSEL OR SITE)  05/03/2023   • IR EMBOLIZATION (SPECIFY VESSEL OR SITE)  05/03/2023     No family history on file    Social History     Socioeconomic History   • Marital status: /Civil Union     Spouse name: Not on file   • Number of children: Not on file   • Years of education: Not on file   • Highest education level: Not on file   Occupational History   • Not on file   Tobacco Use   • Smoking status: Never   • Smokeless tobacco: Never   Vaping Use   • Vaping Use: Never used   Substance and Sexual Activity   • Alcohol use: Not Currently   • Drug use: Not Currently   • Sexual activity: Not on file   Other Topics Concern   • Not on file   Social History Narrative   • Not on file     Social Determinants of Health     Financial Resource Strain: Not on file   Food Insecurity: Not on file   Transportation Needs: No Transportation Needs (5/4/2023)    PRAPARE - Transportation    • Lack of Transportation (Medical): No    • Lack of Transportation (Non-Medical): No   Physical Activity: Not on file   Stress: Not on file   Social Connections: Not on file   Intimate Partner Violence: Not on file   Housing Stability: Not on file       Current Outpatient Medications:   •  calcium carbonate-vitamin D 500 mg-5 mcg per tablet, Take 1 tablet by mouth 2 (two) times a day with meals  Per [facility] [address] [address] Endocrinology office visit 4/21/23: Indications:  , Disp: , Rfl:   •  carbidopa-levodopa (SINEMET)  mg per tablet, Take 1 5 tablets by mouth 3 (three) times a day, Disp: 30 tablet, Rfl: 0  •  clopidogrel (Plavix) 75 mg tablet, Take 1 tablet by mouth daily, Disp: , Rfl:   •  doxycycline hyclate (VIBRAMYCIN) 100 mg capsule, , Disp: , Rfl:   •  ezetimibe-simvastatin (VYTORIN) 10-40 mg per tablet, Take 1 tablet by mouth daily at bedtime, Disp: , Rfl:   •  glimepiride (AMARYL) 2 mg tablet, Take 2 mg by mouth every morning before breakfast, Disp: , Rfl:   •  lacosamide (VIMPAT) 100 mg tablet, Take 100 mg by mouth every 12 (twelve) hours, Disp: , Rfl:   •  lisinopril (ZESTRIL) 5 mg tablet, Take 1 tablet by mouth daily, Disp: , Rfl:   •  metFORMIN (GLUCOPHAGE-XR) 500 mg 24 hr tablet, Take 500 mg by mouth 2 (two) times a day with meals Per [facility] Endocrinology office visit 4/21/23:, Disp: , Rfl:   •  metFORMIN (GLUCOPHAGE-XR) 500 mg 24 hr tablet, Take 1 tablet by mouth 2 (two) times a day with meals, Disp: , Rfl:   •  metoprolol tartrate (LOPRESSOR) 25 mg tablet, Take 25 mg by mouth every 12 (twelve) hours, Disp: , Rfl:   •  OneTouch Verio test strip, , Disp: , Rfl:   •  OYSCO 500 + D 500-5 MG-MCG TABS, TAKE 1 TABLET BY MOUTH DAILY   START 04/21/23, Disp: , Rfl:   •  Xarelto 20 MG tablet, , Disp: , Rfl:   •  acetaminophen (TYLENOL) 325 mg tablet, Take 2 tablets (650 mg total) by mouth every 6 (six) hours as needed for mild pain or fever (Patient not taking: Reported on 6/27/2023), Disp: 30 tablet, Rfl: 0  Allergies   Allergen Reactions   • Sucralose - Food Allergy Headache   • Phenytoin Rash     Red  Jason up arm to head       Vitals:    06/27/23 0902   BP: 126/78   Pulse: 62   Resp: 16   Temp: 98 4 °F (36 9 °C)   SpO2: 97%       Physical Exam   Constitutional: General appearance: The Patient is well-developed and well-nourished who appears the stated age in no acute distress  Patient is pleasant and talkative  HEENT:  Normocephalic  Sclerae are anicteric  Mucous membranes are moist  Neck is supple without adenopathy  No JVD  Chest: The lungs are clear to auscultation  Cardiac: Heart is regular rate  Abdomen: Abdomen is soft, non-tender, non-distended and without masses  Extremities: There is no clubbing or cyanosis  There is no edema  Symmetric  Neuro: Grossly nonfocal  Gait is normal      Lymphatic: No evidence of cervical adenopathy bilaterally  No evidence of axillary adenopathy bilaterally  No evidence of inguinal adenopathy bilaterally  Skin: Warm, anicteric  Psych:  Patient is pleasant and talkative  Breasts:      Pathology:  [unfilled]    Labs:      Imaging  Endoscopic ultrasonography, GI (Upper) Linear with FNA    Result Date: 6/15/2023  Narrative: Table formatting from the original result was not included  [address] [place] [address] Street: 6/15/23 PHYSICIAN(S): Attending: Milind Williamson MD Fellow: No Staff Documented INDICATION: Lesion of pancreas POST-OP DIAGNOSIS: See the impression below  PREPROCEDURE: Informed consent was obtained for the procedure, including sedation  Risks of perforation, hemorrhage, adverse drug reaction and aspiration were discussed  The patient was placed in the left lateral decubitus position  Patient was explained about the risks and benefits of the procedure  Risks including but not limited to bleeding, infection, and perforation were explained in detail   Also explained about less than 100% sensitivity with the exam and other alternatives  PROCEDURE: EUS UPPER DETAILS OF PROCEDURE: Patient was taken to the procedure room where a time out was performed to confirm correct patient and correct procedure  The patient underwent monitored anesthesia care, which was administered by an anesthesia professional  The patient's blood pressure, heart rate, oxygen, respirations, level of consciousness and ECG were monitored throughout the procedure  The endoscope and linear scope were advanced to the second part of the duodenum  Retroflexion was performed in the fundus  The patient experienced no blood loss  The procedure was not difficult  The patient tolerated the procedure well  There were no apparent adverse events  ANESTHESIA INFORMATION: ASA: III Anesthesia Type: IV Sedation with Anesthesia MEDICATIONS: No administrations occurring from 0921 to 1001 on 06/15/23 FINDINGS: EGD Patchy erythematous mucosa in the GE junction; performed cold forceps biopsy Intramural esophageal nodule noted at 30 cm from the incisors  Biopsy was obtained from the superficial surface using cold biopsy forceps  Performed multiple forceps biopsies in the duodenal bulb and 2nd part of the duodenum  To evaluate for celiac disease  EUS Normal celiac takeoff  Bile duct was normal caliber with no filling defects noted  Ampulla appeared normal  16 mm x 14 mm hypoechoic and heterogeneous nodule, contained within the submucosa in the esophagus (29 cm from the incisors) was observed; 3 successful fine needle biopsy passes were taken with a 22 gauge Covidien Sharkcore needle using a transesophageal approach guided by Doppler, sample found to be adequate and sent sample for cytology  Onsite cytologist was not present Irregular, anechoic, heterogeneous and macrocystic cyst measuring 36 mm x 32 mm with irregular margins in the body of the pancreas  Fine needle aspiration passes were taken with a 22 gauge needle using a transgastric approach guided by Doppler  Obtained clear and thick-appearing aspirate, sent sample for chemistry and cytology analysis  Onsite cytologist was not present  There were other smaller sub centimeter cysts scattered through the pancreas including a 1 cm cyst at the distal duct just proximal to the ampulla  At the areas of the cystic lesions of the pancreas, the pancreas did appear to exhibit honeycombing and lobularity, but the other areas appeared normal  SPECIMENS: ID Type Source Tests Collected by Time Destination 1 : Duodenum Tissue Small Bowel, NOS TISSUE EXAM Gabbie Hassan MD 6/15/2023  9:27 AM  2 : [place] Tissue Esophagogastric junction TISSUE EXAM Gabbie Hassan MD 6/15/2023  9:27 AM  3 : Sub mucosal bulge of Esophagus at 29 Tissue Esophagus TISSUE EXAM Gabbie Hassan MD 6/15/2023  9:29 AM  4 : Intra mural Esophageal Nodule  at 40 FNA FNA FINE NEEDLE ASPIRATION Gabbie Hassan MD 6/15/2023  9:49 AM       Impression: EGD: Patchy inflammation at the GE junction s/p biopsy  EUS: Several cysts scattered throughout the pancreas, the largest measuring 3 6 x 3 2 cm at the body of the pancreas s/p FNA and sent for studies  RECOMMENDATION: Follow up cyst fluid results MRI/MRCP in 3-6 months Will place referral to surgical oncology  Gabbie Hassan MD     I reviewed the above laboratory and imaging data  Discussion/Summary: 79-year-old male with a family history of pancreas cancer  He has 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  We discussed with sidebranch IPMN, the risk of malignancy in his lifetime is 10 to 20%  With main duct IPMN, the risk is 50 to 70%  There is also a less than 10% risk of developing a malignancy elsewhere in the pancreas  His main duct is normal   By EUS there is no nodularity seen in the large cyst   We do not have confounding results  Because the nodularity was only questionable by MRI, I would recommend a very short-term MRI    His previous MRI may have been clouded by his perinephric hematoma  I will plan on repeating his MRI in 1 month  I will see him back at that time for another clinical exam   If there is nodularity seen on this MRI, I would recommend surgical resection  If there is no nodularity I would recommend continued short-term observation with MRI every 6 months because of his family history  This is despite the stability seen on CT from the past   Given his family history of pancreas cancer, I have also set him up with genetics  I will see him back once we have all of these results  He and his wife are agreeable to this plan  All their questions were answered

## 2023-06-28 ENCOUNTER — TELEPHONE (OUTPATIENT)
Dept: HEMATOLOGY ONCOLOGY | Facility: CLINIC | Age: 73
End: 2023-06-28

## 2023-06-28 NOTE — TELEPHONE ENCOUNTER
Appointment Change  Cancel, Reschedule, Change to Virtual      Who are you speaking with? Patient and Spouse   If it is not the patient, are they listed on an active communication consent form? Yes   Patient needs to update communication consent to include the name of his spouse (only says spouse), however, he also gave permission for her to speak on his behalf during this call   Which provider is the appointment scheduled with? Dr Natasha Renteria   When is the appointment scheduled? Please list date and time  07/21/23 11:30AM   At which location is the appointment scheduled to take place? [facility]   Was the appointment rescheduled or changed from an in person visit to a virtual visit? If so, please list the details of the change  07/25/23 10AM   What is the reason for the appointment change? Provider out of office   Was [facility]AR transport scheduled for this visit? No   Does STAR transport need to be scheduled for the new visit (if applicable) N/A   Does the patient need an infusion appointment rescheduled? No   Does the patient have an infusion appointment scheduled? If so, when? No   Is the patient undergoing chemotherapy? No   Was the no-show policy reviewed for appointments being changed with less then 24 hours of notice?  N/A

## 2023-06-28 NOTE — TELEPHONE ENCOUNTER
I spoke with Sirisha Vanegas to schedule their consultation with Cancer Risk and Genetics  Scheduling Outcome: Patient is scheduled for an appointment on 12/13/23 at [place] with Danielle Hernadez    Personal/Family History Related to Appointment:     Personal History of Cancer: Patient reports personal history of leukemia    Family History of Cancer: Patient reports family history of mother-pancreatic    Is patient of [facility] G[facility]erson?: No    History of Genetic Testing:  Personal History of Genetic Testing: Patient report no personal history of Genetic Testing  Family History of Genetic Testing: Patient reports that no family members have had Genetic Testing  Progeny:  Is patient able to complete our family history questionnaire on a computer?:  Yes    Patient's preferred e-mail address: [email or web address]

## 2023-07-15 ENCOUNTER — HOSPITAL ENCOUNTER (OUTPATIENT)
Dept: RADIOLOGY | Facility: HOSPITAL | Age: 73
Discharge: HOME | End: 2023-07-15
Attending: SURGERY
Payer: COMMERCIAL

## 2023-07-15 DIAGNOSIS — K86.9 LESION OF PANCREAS: ICD-10-CM

## 2023-07-15 PROCEDURE — G1004 CDSM NDSC: HCPCS

## 2023-07-15 PROCEDURE — 74183 MRI ABD W/O CNTR FLWD CNTR: CPT

## 2023-07-15 PROCEDURE — A9585 GADOBUTROL INJECTION: HCPCS | Performed by: SURGERY

## 2023-07-15 RX ADMIN — GADOBUTROL 8 ML: 604.72 INJECTION INTRAVENOUS at 08:26

## 2023-07-25 ENCOUNTER — OFFICE VISIT (OUTPATIENT)
Dept: SURGICAL ONCOLOGY | Facility: CLINIC | Age: 73
End: 2023-07-25
Payer: COMMERCIAL

## 2023-07-25 VITALS
HEIGHT: 70 IN | HEART RATE: 55 BPM | SYSTOLIC BLOOD PRESSURE: 130 MMHG | BODY MASS INDEX: 26.77 KG/M2 | OXYGEN SATURATION: 97 % | TEMPERATURE: 97.2 F | WEIGHT: 187 LBS | DIASTOLIC BLOOD PRESSURE: 78 MMHG | RESPIRATION RATE: 18 BRPM

## 2023-07-25 DIAGNOSIS — K86.2 PANCREATIC CYST: Primary | ICD-10-CM

## 2023-07-25 PROCEDURE — 99214 OFFICE O/P EST MOD 30 MIN: CPT | Performed by: SURGERY

## 2023-07-25 NOTE — LETTER
July 25, 2023     Sudarshan Rodriguez DO  [address] [facility]   [address]  [address] [place] [address]    Patient: Lina Pelaez III   YOB: 1950   Date of Visit: 7/25/2023       Dear Dr. Corine Wall: Thank you for referring Jorge Alberto Galindo to me for evaluation. Below are my notes for this consultation. If you have questions, please do not hesitate to call me. I look forward to following your patient along with you. Sincerely,        Agus Boateng MD        CC: MD Agus Lee MD  7/25/2023 10:23 AM  Sign when Signing Visit               Surgical Oncology Follow Up       [facility] [address] [id number] [phone]    Lina Pelaez III  1950  [id number]  [id number] [facility] [address] [address] [phone]    Diagnoses and all orders for this visit:    Pancreatic cyst  -     MRI abdomen w wo contrast and mrcp; Future  -     BUN; Future  -     Creatinine, serum; Future        Chief Complaint   Patient presents with   • Follow-up       Return in about 6 months (around 1/25/2024) for Office Visit, Imaging - See orders. Oncology History    No history exists. Staging: Multiple pancreas cystic lesions discovered May 2021  Treatment history: EUS, June 2023  cyst with irregular margins in the body of the pancreas. This measured 3.6 x 3.2 cm. There were other smaller cysts throughout the pancreas. CEA in the cyst fluid was 6,366 ng/mL   amylase was 6,2002 U/L   glucose in the cyst fluid was less than 4.3 mg/dL. Cyst fluid had scant clusters of mucinous cells. There is no evidence of obvious nodularity seen.    Cells were atypical on pathology  Statistically higher risk by Pancragen  Current treatment: Observation given his family history of pancreas cancer  Disease status: BIANCA    History of Present Illness: Returns after his MRI. MRI from July 15, 2023 revealed multiple cysts in the pancreas, the largest was 2.2 cm. There is no nodularity or enhancing components seen. I personally reviewed the films. He is currently feeling well. He denies any abdominal pain, nausea or vomiting. No change in appetite or unintentional weight loss or    Review of Systems  Complete ROS Surg Onc:   Complete ROS Surg Onc:   Constitutional: The patient denies new or recent history of general fatigue, no recent weight loss, no change in appetite. Eyes: No complaints of visual problems, no scleral icterus. ENT: no complaints of ear pain, no hoarseness, no difficulty swallowing,  no tinnitus and no new masses in head, oral cavity, or neck. Cardiovascular: No complaints of chest pain, no palpitations, no ankle edema. Respiratory: No complaints of shortness of breath, no cough. Gastrointestinal: No complaints of jaundice, no bloody stools, no pale stools. Genitourinary: No complaints of dysuria, no hematuria, no nocturia, no frequent urination, no urethral discharge. Musculoskeletal: No complaints of weakness, paralysis, joint stiffness or arthralgias. Integumentary: No complaints of rash, no new lesions. Neurological: No complaints of convulsions, no seizures, no dizziness. Hematologic/Lymphatic: No complaints of easy bruising. Endocrine:  No hot or cold intolerance. No polydipsia, polyphagia, or polyuria. Allergy/immunology:  No environmental allergies. No food allergies. Not immunocompromised. Skin:  No pallor or rash. No wound.         Patient Active Problem List   Diagnosis   • Kidney capsule rupture, left, initial encounter   • Type 2 diabetes mellitus ([address])   • CAD (coronary artery disease)   • Seizure disorder (HCC)   • Leukemia (HCC)   • Splenic hemorrhage   • Paroxysmal atrial fibrillation (HCC)   • Elevated troponin   • Coagulopathy (HCC)   • Acute respiratory insufficiency   • Acute blood loss anemia • Hyperbilirubinemia   • Pancreatic cyst     Past Medical History:   Diagnosis Date   • CAD (coronary artery disease)    • DM (diabetes mellitus) ([address])    • Leukemia ([address])    • Seizure disorder ([address])    • Stroke ([address])     partial vision left eye     Past Surgical History:   Procedure Laterality Date   • BACK SURGERY     • CORONARY ARTERY BYPASS GRAFT     • HERNIA REPAIR     • IR EMBOLIZATION (SPECIFY VESSEL OR SITE)  05/03/2023   • IR EMBOLIZATION (SPECIFY VESSEL OR SITE)  05/03/2023     History reviewed. No pertinent family history. Social History     Socioeconomic History   • Marital status: /Civil Union     Spouse name: Not on file   • Number of children: Not on file   • Years of education: Not on file   • Highest education level: Not on file   Occupational History   • Not on file   Tobacco Use   • Smoking status: Never   • Smokeless tobacco: Never   Vaping Use   • Vaping Use: Never used   Substance and Sexual Activity   • Alcohol use: Not Currently   • Drug use: Not Currently   • Sexual activity: Not on file   Other Topics Concern   • Not on file   Social History Narrative   • Not on file     Social Determinants of Health     Financial Resource Strain: Not on file   Food Insecurity: Not on file   Transportation Needs: No Transportation Needs (5/4/2023)    PRAPARE - Transportation    • Lack of Transportation (Medical): No    • Lack of Transportation (Non-Medical): No   Physical Activity: Not on file   Stress: Not on file   Social Connections: Not on file   Intimate Partner Violence: Not on file   Housing Stability: Not on file       Current Outpatient Medications:   •  calcium carbonate-vitamin D 500 mg-5 mcg per tablet, Take 1 tablet by mouth 2 (two) times a day with meals. Per [facility] End[facility]ology office visit 4/21/23:   Indications: ., Disp: , Rfl:   •  carbidopa-levodopa (SINEMET)  mg per tablet, Take 1.5 tablets by mouth 3 (three) times a day, Disp: 30 tablet, Rfl: 0  •  clopidogrel (Plavix) 75 mg tablet, Take 1 tablet by mouth daily, Disp: , Rfl:   •  ezetimibe-simvastatin (VYTORIN) 10-40 mg per tablet, Take 1 tablet by mouth daily at bedtime, Disp: , Rfl:   •  glimepiride (AMARYL) 2 mg tablet, Take 2 mg by mouth every morning before breakfast, Disp: , Rfl:   •  lacosamide (VIMPAT) 100 mg tablet, Take 100 mg by mouth every 12 (twelve) hours, Disp: , Rfl:   •  lisinopril (ZESTRIL) 5 mg tablet, Take 1 tablet by mouth daily, Disp: , Rfl:   •  metFORMIN (GLUCOPHAGE-XR) 500 mg 24 hr tablet, Take 500 mg by mouth 2 (two) times a day with meals Per [facility] Endocrinology office visit 4/21/23:, Disp: , Rfl:   •  metoprolol tartrate (LOPRESSOR) 25 mg tablet, Take 25 mg by mouth every 12 (twelve) hours, Disp: , Rfl:   •  OneTouch Verio test strip, , Disp: , Rfl:   •  OYSCO 500 + D 500-5 MG-MCG TABS, TAKE 1 TABLET BY MOUTH DAILY. START 04/21/23, Disp: , Rfl:   •  Xarelto 20 MG tablet, , Disp: , Rfl:   •  acetaminophen (TYLENOL) 325 mg tablet, Take 2 tablets (650 mg total) by mouth every 6 (six) hours as needed for mild pain or fever (Patient not taking: Reported on 6/27/2023), Disp: 30 tablet, Rfl: 0  •  doxycycline hyclate (VIBRAMYCIN) 100 mg capsule, , Disp: , Rfl:   •  metFORMIN (GLUCOPHAGE-XR) 500 mg 24 hr tablet, Take 1 tablet by mouth 2 (two) times a day with meals (Patient not taking: Reported on 7/25/2023), Disp: , Rfl:   Allergies   Allergen Reactions   • Sucralose - Food Allergy Headache   • Phenytoin Rash     Red  Jason up arm to head       Vitals:    07/25/23 0959   BP: 130/78   Pulse: 55   Resp: 18   Temp: (!) 97.2 °F (36.2 °C)   SpO2: 97%       Physical Exam  Constitutional: General appearance: The Patient is well-developed and well-nourished who appears the stated age in no acute distress. Patient is pleasant and talkative. HEENT:  Normocephalic. Sclerae are anicteric. Mucous membranes are moist. Neck is supple without adenopathy. No JVD. Chest: The lungs are clear to auscultation. Cardiac: Heart is regular rate. Abdomen: Abdomen is soft, non-tender, non-distended and without masses. Extremities: There is no clubbing or cyanosis. There is no edema. Symmetric. Neuro: Grossly nonfocal. Gait is normal.     Lymphatic: No evidence of cervical adenopathy bilaterally. No evidence of axillary adenopathy bilaterally. No evidence of inguinal adenopathy bilaterally. Skin: Warm, anicteric. Psych:  Patient is pleasant and talkative. Breasts:        Pathology:  [unfilled]    Labs:      Imaging  MRI abdomen w wo contrast and mrcp    Result Date: 7/20/2023  Narrative: MRI OF THE ABDOMEN WITH AND WITHOUT CONTRAST WITH MRCP INDICATION: 72 years / Male  K86.9: Disease of pancreas, unspecified. COMPARISON: MRI abdomen 5/9/2023, CT abdomen pelvis 5/3/2023 TECHNIQUE:  Multiplanar/multisequence MRI of the abdomen with 3D MRCP was performed before and after administration of contrast. Imaging performed on 1.5T MRI IV Contrast:  8 mL of Gadobutrol injection (SINGLE-DOSE) FINDINGS: LOWER CHEST:   Partially visualized at least moderate left pleural effusion with left basilar consolidation,, stable in appearance compared to 5/3/2023 LIVER: Normal in size and configuration. No suspicious mass. A few small simple hepatic cysts. The hepatic veins and portal veins are patent. BILE DUCTS:  No intrahepatic or extrahepatic bile duct dilation. Common bile duct is normal in caliber. No choledocholithiasis, biliary stricture or suspicious mass. GALLBLADDER: Cholelithiasis PANCREAS: No main pancreatic ductal dilation. Again seen are multiple cystic lesions throughout the pancreas, the largest in the pancreatic body measuring up to 2.2 cm (series 6 image 22). No definite internal nodular enhancing components are seen on current examination. . ADRENAL GLANDS:  Normal. SPLEEN:  Normal. Susceptibility artifact from focal calcification in the splenic dome.  Again seen is a 2.3 cm T2 hyperintense lesion in the medial splenic dome with postcontrast enhancement persistent through the delayed images, likely representing a hemangioma. KIDNEYS/PROXIMAL URETERS:  No hydroureteronephrosis. No suspicious renal mass. Right renal simple cysts. Again seen is a 11.9 cm left perinephric hematoma, mildly decreased in size since 5/9/2023 with atrophy of the left kidney, it has been present since 5/3/2023. A few right renal simple cysts. A 1.6 cm right renal lower pole cyst with dependent proteinaceous debris BOWEL:   No dilated loops of bowel. PERITONEUM/RETROPERITONEUM:  No ascites. LYMPH NODES:  No abdominal lymphadenopathy. VASCULAR STRUCTURES:  No aneurysm. ABDOMINAL WALL:  Unremarkable. OSSEOUS STRUCTURES:  No suspicious osseous lesion. Susceptibility artifact from posterior lumbar fusion at L3 and L4 vertebral bodies. No suspicious focal bony lesions. Previously described T11 and L2 vertebral body lesions likely represent Schmorl's node given their location near the endplate. Impression: Again seen are multiple pancreatic cystic lesions, likely representing IPMN. The largest lesion in the pancreatic body measures 2.2 cm without internal worrisome features. This is status post fine-needle aspiration on 6/15/2023. Continued MRI imaging screening for stability of size is recommended as clinically warranted. Stable left perinephric large hematoma with left renal atrophy. Stable partially visualized likely small left pleural effusion and left basilar consolidation, which can represent atelectasis or pneumonitis. Clinical correlation is recommended. The study was marked in EPIC for significant notification. Workstation performed: [id number]     I reviewed the above laboratory and imaging data. Discussion/Summary: 65-year-old male with a family history of pancreas cancer and multiple pancreas cysts. 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 The cyst that was biopsied has a statistically higher risk based on Pancragen.   We discussed the risk of malignancy in his lifetime is 10 to 20% with sidebranch IPMN and a less than 10% risk of developing a malignancy elsewhere in the pancreas. On his most recent MRI there was no nodularity or enhancing nodule. By EUS there was no nodularity as well. I have recommended observation. Given the family history of pancreas cancer I will plan on repeating his MRI in 6 months. He is agreeable to this plan. All his questions were answered.

## 2023-07-25 NOTE — PROGRESS NOTES
Surgical Oncology Follow Up       [address] [facility] [place]  [address] [facility] [address] [phone]    Lui Ayers III  1950  [id number]  [address] [facility]  [address]  [facility] [address] [phone]    Diagnoses and all orders for this visit:    Pancreatic cyst  -     MRI abdomen w wo contrast and mrcp; Future  -     BUN; Future  -     Creatinine, serum; Future        Chief Complaint   Patient presents with   • Follow-up       Return in about 6 months (around 1/25/2024) for Office Visit, Imaging - See orders. Oncology History    No history exists. Staging: Multiple pancreas cystic lesions discovered May 2021  Treatment history: EUS, June 2023  cyst with irregular margins in the body of the pancreas. This measured 3.6 x 3.2 cm. There were other smaller cysts throughout the pancreas. CEA in the cyst fluid was 6,366 ng/mL   amylase was 6,2002 U/L   glucose in the cyst fluid was less than 4.3 mg/dL. Cyst fluid had scant clusters of mucinous cells. There is no evidence of obvious nodularity seen. Cells were atypical on pathology  Statistically higher risk by Santiago  Current treatment: Observation given his family history of pancreas cancer  Disease status: BIANCA    History of Present Illness: Returns after his MRI. MRI from July 15, 2023 revealed multiple cysts in the pancreas, the largest was 2.2 cm. There is no nodularity or enhancing components seen. I personally reviewed the films. He is currently feeling well. He denies any abdominal pain, nausea or vomiting. No change in appetite or unintentional weight loss or    Review of Systems  Complete ROS Surg Onc:   Complete ROS Surg Onc:   Constitutional: The patient denies new or recent history of general fatigue, no recent weight loss, no change in appetite.    Eyes: No complaints of visual problems, no scleral icterus. ENT: no complaints of ear pain, no hoarseness, no difficulty swallowing,  no tinnitus and no new masses in head, oral cavity, or neck. Cardiovascular: No complaints of chest pain, no palpitations, no ankle edema. Respiratory: No complaints of shortness of breath, no cough. Gastrointestinal: No complaints of jaundice, no bloody stools, no pale stools. Genitourinary: No complaints of dysuria, no hematuria, no nocturia, no frequent urination, no urethral discharge. Musculoskeletal: No complaints of weakness, paralysis, joint stiffness or arthralgias. Integumentary: No complaints of rash, no new lesions. Neurological: No complaints of convulsions, no seizures, no dizziness. Hematologic/Lymphatic: No complaints of easy bruising. Endocrine:  No hot or cold intolerance. No polydipsia, polyphagia, or polyuria. Allergy/immunology:  No environmental allergies. No food allergies. Not immunocompromised. Skin:  No pallor or rash. No wound. Patient Active Problem List   Diagnosis   • Kidney capsule rupture, left, initial encounter   • Type 2 diabetes mellitus (HCC)   • CAD (coronary artery disease)   • Seizure disorder (HCC)   • Leukemia (HCC)   • Splenic hemorrhage   • Paroxysmal atrial fibrillation (HCC)   • Elevated troponin   • Coagulopathy (HCC)   • Acute respiratory insufficiency   • Acute blood loss anemia   • Hyperbilirubinemia   • Pancreatic cyst     Past Medical History:   Diagnosis Date   • CAD (coronary artery disease)    • DM (diabetes mellitus) ([address])    • Leukemia ([address])    • Seizure disorder ([address])    • Stroke ([address])     partial vision left eye     Past Surgical History:   Procedure Laterality Date   • BACK SURGERY     • CORONARY ARTERY BYPASS GRAFT     • HERNIA REPAIR     • IR EMBOLIZATION (SPECIFY VESSEL OR SITE)  05/03/2023   • IR EMBOLIZATION (SPECIFY VESSEL OR SITE)  05/03/2023     History reviewed. No pertinent family history.   Social History     Socioeconomic History   • Marital status: /Civil Union     Spouse name: Not on file   • Number of children: Not on file   • Years of education: Not on file   • Highest education level: Not on file   Occupational History   • Not on file   Tobacco Use   • Smoking status: Never   • Smokeless tobacco: Never   Vaping Use   • Vaping Use: Never used   Substance and Sexual Activity   • Alcohol use: Not Currently   • Drug use: Not Currently   • Sexual activity: Not on file   Other Topics Concern   • Not on file   Social History Narrative   • Not on file     Social Determinants of Health     Financial Resource Strain: Not on file   Food Insecurity: Not on file   Transportation Needs: No Transportation Needs (5/4/2023)    PRAPARE - Transportation    • Lack of Transportation (Medical): No    • Lack of Transportation (Non-Medical): No   Physical Activity: Not on file   Stress: Not on file   Social Connections: Not on file   Intimate Partner Violence: Not on file   Housing Stability: Not on file       Current Outpatient Medications:   •  calcium carbonate-vitamin D 500 mg-5 mcg per tablet, Take 1 tablet by mouth 2 (two) times a day with meals. Per [facility] [address] Endocrinology office visit 4/21/23:   Indications: ., Disp: , Rfl:   •  carbidopa-levodopa (SINEMET)  mg per tablet, Take 1.5 tablets by mouth 3 (three) times a day, Disp: 30 tablet, Rfl: 0  •  clopidogrel (Plavix) 75 mg tablet, Take 1 tablet by mouth daily, Disp: , Rfl:   •  ezetimibe-simvastatin (VYTORIN) 10-40 mg per tablet, Take 1 tablet by mouth daily at bedtime, Disp: , Rfl:   •  glimepiride (AMARYL) 2 mg tablet, Take 2 mg by mouth every morning before breakfast, Disp: , Rfl:   •  lacosamide (VIMPAT) 100 mg tablet, Take 100 mg by mouth every 12 (twelve) hours, Disp: , Rfl:   •  lisinopril (ZESTRIL) 5 mg tablet, Take 1 tablet by mouth daily, Disp: , Rfl:   •  metFORMIN (GLUCOPHAGE-XR) 500 mg 24 hr tablet, Take 500 mg by mouth 2 (two) times a day with meals Per [facility] Endocrinology office visit 4/21/23:, Disp: , Rfl:   •  metoprolol tartrate (LOPRESSOR) 25 mg tablet, Take 25 mg by mouth every 12 (twelve) hours, Disp: , Rfl:   •  OneTouch Verio test strip, , Disp: , Rfl:   •  OYSCO 500 + D 500-5 MG-MCG TABS, TAKE 1 TABLET BY MOUTH DAILY. START 04/21/23, Disp: , Rfl:   •  Xarelto 20 MG tablet, , Disp: , Rfl:   •  acetaminophen (TYLENOL) 325 mg tablet, Take 2 tablets (650 mg total) by mouth every 6 (six) hours as needed for mild pain or fever (Patient not taking: Reported on 6/27/2023), Disp: 30 tablet, Rfl: 0  •  doxycycline hyclate (VIBRAMYCIN) 100 mg capsule, , Disp: , Rfl:   •  metFORMIN (GLUCOPHAGE-XR) 500 mg 24 hr tablet, Take 1 tablet by mouth 2 (two) times a day with meals (Patient not taking: Reported on 7/25/2023), Disp: , Rfl:   Allergies   Allergen Reactions   • Sucralose - Food Allergy Headache   • Phenytoin Rash     Red  Jason up arm to head       Vitals:    07/25/23 0959   BP: 130/78   Pulse: 55   Resp: 18   Temp: (!) 97.2 °F (36.2 °C)   SpO2: 97%       Physical Exam  Constitutional: General appearance: The Patient is well-developed and well-nourished who appears the stated age in no acute distress. Patient is pleasant and talkative. HEENT:  Normocephalic. Sclerae are anicteric. Mucous membranes are moist. Neck is supple without adenopathy. No JVD. Chest: The lungs are clear to auscultation. Cardiac: Heart is regular rate. Abdomen: Abdomen is soft, non-tender, non-distended and without masses. Extremities: There is no clubbing or cyanosis. There is no edema. Symmetric. Neuro: Grossly nonfocal. Gait is normal.     Lymphatic: No evidence of cervical adenopathy bilaterally. No evidence of axillary adenopathy bilaterally. No evidence of inguinal adenopathy bilaterally. Skin: Warm, anicteric. Psych:  Patient is pleasant and talkative.   Breasts:        Pathology:  [unfilled]    Labs:      Imaging  MRI abdomen w wo contrast and mrcp    Result Date: 7/20/2023  Narrative: MRI OF THE ABDOMEN WITH AND WITHOUT CONTRAST WITH MRCP INDICATION: 72 years / Male  K86.9: Disease of pancreas, unspecified. COMPARISON: MRI abdomen 5/9/2023, CT abdomen pelvis 5/3/2023 TECHNIQUE:  Multiplanar/multisequence MRI of the abdomen with 3D MRCP was performed before and after administration of contrast. Imaging performed on 1.5T MRI IV Contrast:  8 mL of Gadobutrol injection (SINGLE-DOSE) FINDINGS: LOWER CHEST:   Partially visualized at least moderate left pleural effusion with left basilar consolidation,, stable in appearance compared to 5/3/2023 LIVER: Normal in size and configuration. No suspicious mass. A few small simple hepatic cysts. The hepatic veins and portal veins are patent. BILE DUCTS:  No intrahepatic or extrahepatic bile duct dilation. Common bile duct is normal in caliber. No choledocholithiasis, biliary stricture or suspicious mass. GALLBLADDER: Cholelithiasis PANCREAS: No main pancreatic ductal dilation. Again seen are multiple cystic lesions throughout the pancreas, the largest in the pancreatic body measuring up to 2.2 cm (series 6 image 22). No definite internal nodular enhancing components are seen on current examination. . ADRENAL GLANDS:  Normal. SPLEEN:  Normal. Susceptibility artifact from focal calcification in the splenic dome. Again seen is a 2.3 cm T2 hyperintense lesion in the medial splenic dome with postcontrast enhancement persistent through the delayed images, likely representing a hemangioma. KIDNEYS/PROXIMAL URETERS:  No hydroureteronephrosis. No suspicious renal mass. Right renal simple cysts. Again seen is a 11.9 cm left perinephric hematoma, mildly decreased in size since 5/9/2023 with atrophy of the left kidney, it has been present since 5/3/2023. A few right renal simple cysts. A 1.6 cm right renal lower pole cyst with dependent proteinaceous debris BOWEL:   No dilated loops of bowel. PERITONEUM/RETROPERITONEUM:  No ascites. LYMPH NODES:  No abdominal lymphadenopathy. VASCULAR STRUCTURES:  No aneurysm. ABDOMINAL WALL:  Unremarkable. OSSEOUS STRUCTURES:  No suspicious osseous lesion. Susceptibility artifact from posterior lumbar fusion at L3 and L4 vertebral bodies. No suspicious focal bony lesions. Previously described T11 and L2 vertebral body lesions likely represent Schmorl's node given their location near the endplate. Impression: Again seen are multiple pancreatic cystic lesions, likely representing IPMN. The largest lesion in the pancreatic body measures 2.2 cm without internal worrisome features. This is status post fine-needle aspiration on 6/15/2023. Continued MRI imaging screening for stability of size is recommended as clinically warranted. Stable left perinephric large hematoma with left renal atrophy. Stable partially visualized likely small left pleural effusion and left basilar consolidation, which can represent atelectasis or pneumonitis. Clinical correlation is recommended. The study was marked in EPIC for significant notification. Workstation performed: [id number]     I reviewed the above laboratory and imaging data. Discussion/Summary: 82-year-old male with a family history of pancreas cancer and multiple pancreas cysts. 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 The cyst that was biopsied has a statistically higher risk based on Pancragen. We discussed the risk of malignancy in his lifetime is 10 to 20% with sidebranch IPMN and a less than 10% risk of developing a malignancy elsewhere in the pancreas. On his most recent MRI there was no nodularity or enhancing nodule. By EUS there was no nodularity as well. I have recommended observation. Given the family history of pancreas cancer I will plan on repeating his MRI in 6 months. He is agreeable to this plan. All his questions were answered.

## 2023-11-30 ENCOUNTER — TELEPHONE (OUTPATIENT)
Dept: GASTROENTEROLOGY | Facility: CLINIC | Age: 73
End: 2023-11-30

## 2023-11-30 NOTE — TELEPHONE ENCOUNTER
Reviewing Pathology       Patient over due for procedure       Note from provider :    RECOMMENDATION:  Follow up cyst fluid results  MRI/MRCP in 3-6 months  Will place referral to surgical oncology.

## 2023-11-30 NOTE — TELEPHONE ENCOUNTER
EUS performed on 6/15/23 by Dr. Nia Gabriel. Procedure recommendation noted below. RECOMMENDATION:  Follow up cyst fluid results  MRI/MRCP in 3-6 months  Will place referral to surgical oncology. MRI/MRCP completed on 7/15/23 and patient is following with surgical oncology.

## 2023-12-01 ENCOUNTER — TELEPHONE (OUTPATIENT)
Dept: GENETICS | Facility: CLINIC | Age: 73
End: 2023-12-01

## 2023-12-01 NOTE — TELEPHONE ENCOUNTER
Called patient to confirm upcoming genetics appointment on 12/13/2023 at 11:00AM. There was no answer and I was unable to leave a voice message.

## 2023-12-11 NOTE — PROGRESS NOTES
Pre-Test Genetic Counseling Consult Note    Patient Name: Bibi Jackson III   /Age: 1950/73 y.o. Referring Provider: Yumiko Mckenzie MD, FACS    Date of Service: 2023  Genetic Counselor: Nadya Nascimento MS, [facility]  Interpretation Services: None  [facility]    Nacho Bentley was referred to the [address] [place], [place] [id number] and Genetic Assessment Program due to his personal history of leukemia and family history of pancreatic cancer . he presents today to discuss the possibility of a hereditary cancer syndrome, options for genetic testing, and implications for him and his family. His wife, Frankey Agent, accompanied him to the appointment. Cancer History and Treatment:     Personal History: Personal history of Leukemia () in remission, unspecified leukemia type. He also has multiple pancreatic cysts appearing to be a sidebranch IPMN    Dr. Brian Alvarez note 925/23:  53-year-old male with a family history of pancreas cancer and multiple pancreas cysts. 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 The cyst that was biopsied has a statistically higher risk based on Pancragen. We discussed the risk of malignancy in his lifetime is 10 to 20% with sidebranch IPMN and a less than 10% risk of developing a malignancy elsewhere in the pancreas. On his most recent MRI there was no nodularity or enhancing nodule. By EUS there was no nodularity as well. I have recommended observation. Given the family history of pancreas cancer I will plan on repeating his MRI in 6 months.   He is agreeable to this plan     Screening Hx:     Colon:  Colonoscopy: Nacho Bentley has never had a colonoscopy     Prostate:  Prostate screening: Routine     Medical and Surgical History  Pertinent surgical history:   Past Surgical History:   Procedure Laterality Date    BACK SURGERY      CORONARY ARTERY BYPASS GRAFT      HERNIA REPAIR      IR EMBOLIZATION (SPECIFY VESSEL OR SITE)  2023    IR EMBOLIZATION (SPECIFY VESSEL OR SITE)  05/03/2023      Pertinent medical history:  Past Medical History:   Diagnosis Date    CAD (coronary artery disease)     DM (diabetes mellitus) ([address])     Leukemia ([address])     Seizure disorder ([address])     Stroke ([address])     partial vision left eye       Other History:  Height:   Ht Readings from Last 1 Encounters:   07/25/23 5' 10" (1.778 m)     Weight:   Wt Readings from Last 1 Encounters:   07/25/23 84.8 kg (187 lb)     Relevant Family History   Patient reports no Ashkenazi Islam ancestry. Please refer to the scanned pedigree in the Media Tab for a complete family history     *All history is reported as provided by the patient; records are not available for review, except where indicated. Assessment:  We discussed sporadic, familial and hereditary cancer. We also discussed the many factors that influence our risk for cancer such as age, environmental exposures, lifestyle choices and family history. We reviewed the indications suggestive of a hereditary predisposition to cancer. Genetic testing is indicated for Chris Philip based on the following criteria: Meets NCCN V2.2024 Testing Criteria for Pancreatic Cancer Susceptibility Genes: Family history of a mother [facility]) with pancreatic cancer     The risks, benefits, and limitations of genetic testing were reviewed with the patient, as well as genetic discrimination laws, and possible test results (positive, negative, variants of uncertain significance) and their clinical implications. If positive for a mutation, options for managing cancer risk including increased surveillance, chemoprevention, and in some cases prophylactic surgery were discussed. Chris Philip was informed that if a hereditary cancer syndrome was identified in him, first degree relatives (parents, siblings, and children) have a chance of also inheriting the condition.  Genetic testing would allow for predictive genetic testing in other relatives, who may also be at risk depending on their degree of relation. Given Jonathan's history of multiple pancreas cysts appearing to be sidebranch IPMN, this genetic test result may help clarify his risk for developing pancreatic cancer as it will help determine if there is a genetic risk factor. Billing:  Most individuals pay <$100 for hereditary cancer genetic testing. If insurance covers the cost of the testing, individuals may still pay out of pocket secondary to co-pays, co-insurance, or deductibles. If the cost of the testing exceeds $100, the lab will reach out to the patient via phone or e-mail. The patient will then have the option to proceed with the testing, cancel the testing, or elect the self-pay option of $250. Katelyn Murcia verbalized understanding. Plan: Patient decided not to proceed with testing at this time. Katelyn Murcia wanted more time to consider testing prior to proceeding. We provided him with an [facility] Cancer Brochure and our contact information. Katelyn Murcia can reach out to our office at [phone] if/when he is ready to proceed with testing or if he has any additional questions.

## 2023-12-13 ENCOUNTER — CLINICAL SUPPORT (OUTPATIENT)
Dept: GENETICS | Facility: CLINIC | Age: 73
End: 2023-12-13

## 2023-12-13 DIAGNOSIS — K86.9 LESION OF PANCREAS: Primary | ICD-10-CM

## 2023-12-13 DIAGNOSIS — Z80.0 FAMILY HISTORY OF PANCREATIC CANCER: ICD-10-CM

## 2023-12-13 NOTE — LETTER
December 15, 2023     Sharon Jung MD  [address]  [address] [place] [address] [address] [facility]    Patient: Pascale Akbar III  YOB: 1950  Date of Visit: 2023      Dear Dr. Benita Farrar:    Thank you for referring Juanis Mancini to me for evaluation. Below are my notes for this consultation. If you have questions, please do not hesitate to call me. I look forward to following your patient along with you. Sincerely,        Ashley Treviño GC        CC: No Recipients        Pre-Test Genetic Counseling Consult Note    Patient Name: Pascale Akbar III   /Age: 1950/73 y.o. Referring Provider: Sharon Jung MD, [facility]    Date of Service: 2023  Genetic Counselor: Robinson Loera MS, [facility] [place] [facility]  Interpretation Services: None  [facility]: [facility]    oGldy Galaviz was referred to the [address] [place] [address],[address] Floor and Genetic Assessment Program due to his personal history of leukemia and family history of pancreatic cancer . he presents today to discuss the possibility of a hereditary cancer syndrome, options for genetic testing, and implications for him and his family. His wife, Tonya Oh, accompanied him to the appointment. Cancer History and Treatment:     Personal History: Personal history of Leukemia () in remission, unspecified leukemia type. He also has multiple pancreatic cysts appearing to be a sidebranch IPMN    Dr. Valdes Half note 925/23:  72-year-old male with a family history of pancreas cancer and multiple pancreas cysts. 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 The cyst that was biopsied has a statistically higher risk based on Pancragen. We discussed the risk of malignancy in his lifetime is 10 to 20% with sidebranch IPMN and a less than 10% risk of developing a malignancy elsewhere in the pancreas. On his most recent MRI there was no nodularity or enhancing nodule. By EUS there was no nodularity as well. I have recommended observation.   Given the family history of pancreas cancer I will plan on repeating his MRI in 6 months. He is agreeable to this plan     Screening Hx:     Colon:  Colonoscopy: Ever Lily has never had a colonoscopy     Prostate:  Prostate screening: Routine     Medical and Surgical History  Pertinent surgical history:   Past Surgical History:   Procedure Laterality Date   • BACK SURGERY     • CORONARY ARTERY BYPASS GRAFT     • HERNIA REPAIR     • IR EMBOLIZATION (SPECIFY VESSEL OR SITE)  05/03/2023   • IR EMBOLIZATION (SPECIFY VESSEL OR SITE)  05/03/2023      Pertinent medical history:  Past Medical History:   Diagnosis Date   • CAD (coronary artery disease)    • DM (diabetes mellitus) ([address])    • Leukemia ([address])    • Seizure disorder ([address])    • Stroke ([address])     partial vision left eye       Other History:  Height:   Ht Readings from Last 1 Encounters:   07/25/23 5' 10" (1.778 m)     Weight:   Wt Readings from Last 1 Encounters:   07/25/23 84.8 kg (187 lb)     Relevant Family History   Patient reports no Ashkenazi Voodoo ancestry. Please refer to the scanned pedigree in the Media Tab for a complete family history     *All history is reported as provided by the patient; records are not available for review, except where indicated. Assessment:  We discussed sporadic, familial and hereditary cancer. We also discussed the many factors that influence our risk for cancer such as age, environmental exposures, lifestyle choices and family history. We reviewed the indications suggestive of a hereditary predisposition to cancer.     Genetic testing is indicated for Ever Lily based on the following criteria: Meets NCCN V2.2024 Testing Criteria for Pancreatic Cancer Susceptibility Genes: Family history of a mother [facility]) with pancreatic cancer     The risks, benefits, and limitations of genetic testing were reviewed with the patient, as well as genetic discrimination laws, and possible test results (positive, negative, variants of uncertain significance) and their clinical implications. If positive for a mutation, options for managing cancer risk including increased surveillance, chemoprevention, and in some cases prophylactic surgery were discussed. Patrick Bautista was informed that if a hereditary cancer syndrome was identified in him, first degree relatives (parents, siblings, and children) have a chance of also inheriting the condition. Genetic testing would allow for predictive genetic testing in other relatives, who may also be at risk depending on their degree of relation. Given Jonathan's history of multiple pancreas cysts appearing to be sidebranch IPMN, this genetic test result may help clarify his risk for developing pancreatic cancer as it will help determine if there is a genetic risk factor. Billing:  Most individuals pay <$100 for hereditary cancer genetic testing. If insurance covers the cost of the testing, individuals may still pay out of pocket secondary to co-pays, co-insurance, or deductibles. If the cost of the testing exceeds $100, the lab will reach out to the patient via phone or e-mail. The patient will then have the option to proceed with the testing, cancel the testing, or elect the self-pay option of $250. Patrick Bautista verbalized understanding. Plan: Patient decided not to proceed with testing at this time. Ever Smiling wanted more time to consider testing prior to proceeding. We provided him with an [facility]red[facility] Brochure and our contact information. Ever Smiling can reach out to our office at [phone] if/when he is ready to proceed with testing or if he has any additional questions.

## 2024-01-02 ENCOUNTER — APPOINTMENT (OUTPATIENT)
Dept: LAB | Facility: CLINIC | Age: 74
End: 2024-01-02
Payer: COMMERCIAL

## 2024-01-02 DIAGNOSIS — K86.2 PANCREATIC CYST: ICD-10-CM

## 2024-01-02 DIAGNOSIS — K86.9 LESION OF PANCREAS: ICD-10-CM

## 2024-01-02 LAB
BUN SERPL-MCNC: 16 MG/DL (ref 5–25)
CREAT SERPL-MCNC: 1.14 MG/DL (ref 0.6–1.3)
GFR SERPL CREATININE-BSD FRML MDRD: 64 ML/MIN/1.73SQ M

## 2024-01-02 PROCEDURE — 84520 ASSAY OF UREA NITROGEN: CPT

## 2024-01-02 PROCEDURE — 82565 ASSAY OF CREATININE: CPT

## 2024-01-02 PROCEDURE — 36415 COLL VENOUS BLD VENIPUNCTURE: CPT

## 2024-01-25 ENCOUNTER — HOSPITAL ENCOUNTER (OUTPATIENT)
Dept: RADIOLOGY | Facility: HOSPITAL | Age: 74
Discharge: HOME | End: 2024-01-25
Attending: SURGERY
Payer: COMMERCIAL

## 2024-01-25 DIAGNOSIS — K86.2 PANCREATIC CYST: ICD-10-CM

## 2024-01-25 PROCEDURE — 74183 MRI ABD W/O CNTR FLWD CNTR: CPT

## 2024-01-25 PROCEDURE — A9585 GADOBUTROL INJECTION: HCPCS | Performed by: SURGERY

## 2024-01-25 PROCEDURE — G1004 CDSM NDSC: HCPCS

## 2024-01-25 RX ORDER — GADOBUTROL 604.72 MG/ML
8 INJECTION INTRAVENOUS
Status: COMPLETED | OUTPATIENT
Start: 2024-01-25 | End: 2024-01-25

## 2024-01-25 RX ADMIN — GADOBUTROL 8 ML: 604.72 INJECTION INTRAVENOUS at 15:44

## 2024-01-26 ENCOUNTER — TELEPHONE (OUTPATIENT)
Dept: HEMATOLOGY ONCOLOGY | Facility: CLINIC | Age: 74
End: 2024-01-26

## 2024-01-26 NOTE — TELEPHONE ENCOUNTER
Appointment Change  Cancel, Reschedule, Change to Virtual      Who are you speaking with? Patient   If it is not the patient, is the caller listed on the communication consent form? N/A   Which provider is the appointment scheduled with? Dr. Jensen   When was the original appointment scheduled?    Please list date and time 02/02/2024 @ 11:15AM    At which location is the appointment scheduled to take place? [facility]   Was the appointment rescheduled?     Was the appointment changed from an in person visit to a virtual visit?    If so, please list the details of the change. Yes, 02/16/2024 @ 8:30AM    What is the reason for the appointment change? Provider

## 2024-02-16 ENCOUNTER — OFFICE VISIT (OUTPATIENT)
Dept: SURGICAL ONCOLOGY | Facility: CLINIC | Age: 74
End: 2024-02-16
Payer: COMMERCIAL

## 2024-02-16 VITALS
OXYGEN SATURATION: 95 % | HEIGHT: 70 IN | BODY MASS INDEX: 28.4 KG/M2 | WEIGHT: 198.4 LBS | TEMPERATURE: 99.2 F | DIASTOLIC BLOOD PRESSURE: 70 MMHG | SYSTOLIC BLOOD PRESSURE: 162 MMHG | HEART RATE: 62 BPM

## 2024-02-16 DIAGNOSIS — K86.2 PANCREATIC CYST: Primary | ICD-10-CM

## 2024-02-16 PROCEDURE — 99214 OFFICE O/P EST MOD 30 MIN: CPT | Performed by: SURGERY

## 2024-02-16 NOTE — PROGRESS NOTES
Surgical Oncology Follow Up       [facility] [place]  [address] [address]  [phone]    Jonathan WOODY [facility]  1950  [id number]  [facility] [place]  [address] [facility] [address]  [phone]    Diagnoses and all orders for this visit:    Pancreatic cyst  -     MRI abdomen w wo contrast and mrcp; Future  -     BUN; Future  -     Creatinine, serum; Future        Chief Complaint   Patient presents with    Follow-up       Return in about 6 months (around 8/16/2024) for Office Visit, Imaging - See orders, with Danica.      Oncology History    No history exists.       Staging: Multiple pancreas cystic lesions discovered May 2021  Treatment history: EUS, June 2023  cyst with irregular margins in the body of the pancreas.  This measured 3.6 x 3.2 cm.  There were other smaller cysts throughout the pancreas.    CEA in the cyst fluid was 6,366 ng/mL   amylase was 6,2002 U/L   glucose in the cyst fluid was less than 4.3 mg/dL.  Cyst fluid had scant clusters of mucinous cells.  There is no evidence of obvious nodularity seen.   Cells were atypical on pathology  Statistically higher risk by Pancragen  Current treatment: Observation given his family history of pancreas cancer  Disease status: BIANCA    History of Present Illness: Patient returns after his MRI.  He is feeling well.  No abdominal pain, nausea or vomiting.  No change in appetite or unintentional weight loss.  MRI from January 25, 2024 revealed stable pancreas cyst.  The largest was 2.2 cm.  There were no worrisome or suspicious features.  I personally reviewed the films.    Review of Systems  Complete ROS Surg Onc:   Complete ROS Surg Onc:   Constitutional: The patient denies new or recent history of general fatigue, no recent weight loss, no change in appetite.   Eyes: No complaints of visual  problems, no scleral icterus.   ENT: no complaints of ear pain, no hoarseness, no difficulty swallowing,  no tinnitus and no new masses in head, oral cavity, or neck.   Cardiovascular: No complaints of chest pain, no palpitations, no ankle edema.   Respiratory: No complaints of shortness of breath, no cough.   Gastrointestinal: No complaints of jaundice, no bloody stools, no pale stools.   Genitourinary: No complaints of dysuria, no hematuria, no nocturia, no frequent urination, no urethral discharge.   Musculoskeletal: No complaints of weakness, paralysis, joint stiffness or arthralgias.  Integumentary: No complaints of rash, no new lesions.   Neurological: No complaints of convulsions, no seizures, no dizziness.   Hematologic/Lymphatic: No complaints of easy bruising.   Endocrine:  No hot or cold intolerance.  No polydipsia, polyphagia, or polyuria.  Allergy/immunology:  No environmental allergies.  No food allergies.  Not immunocompromised.  Skin:  No pallor or rash.  No wound.        Patient Active Problem List   Diagnosis    Kidney capsule rupture, left, initial encounter    Type 2 diabetes mellitus (HCC)    CAD (coronary artery disease)    Seizure disorder (HCC)    Leukemia (HCC)    Splenic hemorrhage    Paroxysmal atrial fibrillation (HCC)    Elevated troponin    Coagulopathy (HCC)    Acute respiratory insufficiency    Acute blood loss anemia    Hyperbilirubinemia    Pancreatic cyst     Past Medical History:   Diagnosis Date    CAD (coronary artery disease)     DM (diabetes mellitus) (HCC)     Leukemia (HCC)     Seizure disorder (HCC)     Stroke (HCC)     partial vision left eye     Past Surgical History:   Procedure Laterality Date    BACK SURGERY      CORONARY ARTERY BYPASS GRAFT      HERNIA REPAIR      IR EMBOLIZATION (SPECIFY VESSEL OR SITE)  05/03/2023    IR EMBOLIZATION (SPECIFY VESSEL OR SITE)  05/03/2023     No family history on file.  Social History     Socioeconomic History    Marital status:  /Civil Union     Spouse name: Not on file    Number of children: Not on file    Years of education: Not on file    Highest education level: Not on file   Occupational History    Not on file   Tobacco Use    Smoking status: Never    Smokeless tobacco: Never   Vaping Use    Vaping status: Never Used   Substance and Sexual Activity    Alcohol use: Not Currently    Drug use: Not Currently    Sexual activity: Not on file   Other Topics Concern    Not on file   Social History Narrative    Not on file     Social Determinants of Health     Financial Resource Strain: Not on file   Food Insecurity: Not on file   Transportation Needs: No Transportation Needs (5/4/2023)    PRAPARE - Transportation     Lack of Transportation (Medical): No     Lack of Transportation (Non-Medical): No   Physical Activity: Not on file   Stress: Not on file   Social Connections: Not on file   Intimate Partner Violence: Not on file   Housing Stability: Not on file       Current Outpatient Medications:     carbidopa-levodopa (SINEMET)  mg per tablet, Take 1.5 tablets by mouth 3 (three) times a day, Disp: 30 tablet, Rfl: 0    ezetimibe-simvastatin (VYTORIN) 10-40 mg per tablet, Take 1 tablet by mouth daily at bedtime, Disp: , Rfl:     glimepiride (AMARYL) 2 mg tablet, Take 2 mg by mouth every morning before breakfast, Disp: , Rfl:     lacosamide (VIMPAT) 100 mg tablet, Take 100 mg by mouth every 12 (twelve) hours, Disp: , Rfl:     metFORMIN (GLUCOPHAGE-XR) 500 mg 24 hr tablet, Take 500 mg by mouth 2 (two) times a day with meals Per [facility] office visit 4/21/23:, Disp: , Rfl:     metoprolol tartrate (LOPRESSOR) 25 mg tablet, Take 25 mg by mouth every 12 (twelve) hours, Disp: , Rfl:     OneTouch Verio test strip, , Disp: , Rfl:     OYSCO 500 + D 500-5 MG-MCG TABS, TAKE 1 TABLET BY MOUTH DAILY. START 04/21/23, Disp: , Rfl:     acetaminophen (TYLENOL) 325 mg tablet, Take 2 tablets (650 mg total) by mouth every 6 (six) hours as  needed for mild pain or fever (Patient not taking: Reported on 6/27/2023), Disp: 30 tablet, Rfl: 0    calcium carbonate-vitamin D 500 mg-5 mcg per tablet, Take 1 tablet by mouth 2 (two) times a day with meals. Per [facility] End[facility] office visit 4/21/23:  Indications: ., Disp: , Rfl:     clopidogrel (Plavix) 75 mg tablet, Take 1 tablet by mouth daily, Disp: , Rfl:     doxycycline hyclate (VIBRAMYCIN) 100 mg capsule, , Disp: , Rfl:     lisinopril (ZESTRIL) 5 mg tablet, Take 1 tablet by mouth daily, Disp: , Rfl:     metFORMIN (GLUCOPHAGE-XR) 500 mg 24 hr tablet, Take 1 tablet by mouth 2 (two) times a day with meals (Patient not taking: Reported on 7/25/2023), Disp: , Rfl:     Xarelto 20 MG tablet, , Disp: , Rfl:   Allergies   Allergen Reactions    Sucralose - Food Allergy Headache    Phenytoin Rash     Red  Jason up arm to head       Vitals:    02/16/24 0822   BP: 162/70   Pulse: 62   Temp: 99.2 °F (37.3 °C)   SpO2: 95%       Physical Exam  Constitutional: General appearance: The Patient is well-developed and well-nourished who appears the stated age in no acute distress. Patient is pleasant and talkative.     HEENT:  Normocephalic.  Sclerae are anicteric. Mucous membranes are moist. Neck is supple without adenopathy. No JVD.     Chest: The lungs are clear to auscultation.     Cardiac: Heart is regular rate.     Abdomen: Abdomen is soft, non-tender, non-distended and without masses.     Extremities: There is no clubbing or cyanosis. There is no edema.  Symmetric.  Neuro: Grossly nonfocal. Gait is normal.     Lymphatic: No evidence of cervical adenopathy bilaterally.   No evidence of axillary adenopathy bilaterally.    Skin: Warm, anicteric.    Psych:  Patient is pleasant and talkative.  Breasts:        Pathology:  [unfilled]    Labs:      Imaging  XR knee 4+ vw right injury    Result Date: 2/11/2024  Narrative: History: 73-year-old male status post fall with right knee pain. FINDINGS: Total of 5 images of right  "knee were obtained on 2/11/2024. No prior study is available for comparison. No definite evidence of acute fracture or joint dislocation is seen. Moderate sized suprapatellar joint effusion is seen. There are mild narrowing of medial and lateral knee compartment with mild chondrocalcinosis. There is severe narrowing of patellofemoral joint with prominent suprapatella and infrapatellar spur. The rest of the visualized bony structures are unremarkable. Small vascular calcifications are seen in distal right thigh and proximal right leg. Few surgical clips are seen at posteromedial right thigh.    Impression: IMPRESSION: 1. No definite evidence acute fracture or joint dislocation. Moderate sized suprapatellar joint effusion. 2. Significant degenerative changes and mild vascular calcifications. Workstation:[id number]    MRI abdomen w wo contrast and mrcp    Result Date: 2/4/2024  Narrative: MRI OF THE ABDOMEN WITH AND WITHOUT CONTRAST WITH MRCP INDICATION: 73 years / Male. K86.2: Cyst of pancreas. Excerpt from Surgical Oncology progress note dated 7/25/2023:  \" 73-year-old male with a family history of pancreas cancer and multiple pancreas cysts.  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  The cyst that was biopsied has a statistically higher risk based  on Pancragen.  We discussed the risk of malignancy in his lifetime is 10 to 20% with sidebranch IPMN and a less than 10% risk of developing a malignancy elsewhere in the pancreas.  On his most recent MRI there was no nodularity or enhancing nodule.  By EUS there was no nodularity as well.  I have recommended observation.  Given the family history of pancreas cancer I will plan on repeating his MRI in 6 month\" COMPARISON: MRI abdomen 7/15/2023 TECHNIQUE: Multiplanar/multisequence MRI of the abdomen with 3D MRCP was performed before and after administration of contrast. IV Contrast: 8 mL of Gadobutrol injection (SINGLE-DOSE) FINDINGS: LOWER CHEST: Unchanged left basilar effusion and " associated consolidation. LIVER: Normal in size and configuration. No suspicious mass. Scattered simple cysts. Patent hepatic and portal veins. BILE DUCTS: No intrahepatic or extrahepatic bile duct dilation. Common bile duct is normal in caliber. No choledocholithiasis, biliary stricture or suspicious mass. GALLBLADDER: Cholelithiasis without findings of acute cholecystitis. PANCREAS: Unchanged scattered pancreatic cysts measuring up to 2.2 cm #6/23. No new pancreatic findings. No pancreatic ductal dilation. ADRENAL GLANDS: Unremarkable. SPLEEN: Unchanged 2.3 cm splenic hemangioma #6/14. KIDNEYS/PROXIMAL URETERS: No hydroureteronephrosis. No suspicious renal mass. Scattered simple/proteinaceous cysts. Decreased left perinephric hematoma now measuring 2.6 cm in thickness. Prominent left renal atrophy. BOWEL: No dilated loops of bowel. PERITONEUM/RETROPERITONEUM: No ascites. LYMPH NODES: No abdominal lymphadenopathy. VESSELS: No aneurysm. ABDOMINAL WALL: Unremarkable BONES: No suspicious osseous lesion.     Impression: Innumerable pancreatic parenchymal cysts measuring up to 2.2 cm unchanged from 7/15/2023. The dominant lesion is status post FNA. Follow-up interval as per surgical oncology. The study was marked in EPIC for significant notification. Workstation performed: [id number]     I personally reviewed and interpreted the above laboratory and imaging data.    Discussion/Summary: 73-year-old male with a family history of pancreas cancer and multiple pancreas cysts.  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  The cyst that was biopsied has a statistically higher risk based on Pancragen.  We discussed the risk of malignancy in his lifetime is 10 to 20% with sidebranch IPMN and a less than 10% risk of developing a malignancy elsewhere in the pancreas.  On his most recent MRI there was no nodularity or enhancing nodule.  The cyst is stable in size.  By EUS there was no nodularity as well.  I have recommended observation.   Given the family history of pancreas cancer I will plan on repeating his MRI in 6 months.  We will ensure 2 years of stability prior to once a year imaging.  He is agreeable to this plan.  All his questions were answered.

## 2024-02-16 NOTE — LETTER
February 16, 2024     Austin Tripp DO  [address]   [address]  [place] [address]    Patient: Jonathan Ordaz III   YOB: 1950   Date of Visit: 2/16/2024       Dear Dr. Tripp:    Thank you for referring Jonathan Ordaz to me for evaluation. Below are my notes for this consultation.    If you have questions, please do not hesitate to call me. I look forward to following your patient along with you.         Sincerely,        Luis Jensen MD        CC: MD Luis Ceballos MD  2/16/2024  8:46 AM  Sign when Signing Visit               Surgical Oncology Follow Up       Formerly named [facility] [place]  [address] [address]  [phone]    Jonathan WOODY Res III  1950  [id number]  Formerly named [facility]  [address] [address]  [phone]    Diagnoses and all orders for this visit:    Pancreatic cyst  -     MRI abdomen w wo contrast and mrcp; Future  -     BUN; Future  -     Creatinine, serum; Future        Chief Complaint   Patient presents with   • Follow-up       Return in about 6 months (around 8/16/2024) for Office Visit, Imaging - See orders, with Danica.      Oncology History    No history exists.       Staging: Multiple pancreas cystic lesions discovered May 2021  Treatment history: EUS, June 2023  cyst with irregular margins in the body of the pancreas.  This measured 3.6 x 3.2 cm.  There were other smaller cysts throughout the pancreas.    CEA in the cyst fluid was 6,366 ng/mL   amylase was 6,2002 U/L   glucose in the cyst fluid was less than 4.3 mg/dL.  Cyst fluid had scant clusters of mucinous cells.  There is no evidence of obvious nodularity seen.   Cells were atypical on pathology  Statistically higher risk by Pancragen  Current treatment: Observation given his family history of pancreas cancer  Disease status: BIANCA    History of  Present Illness: Patient returns after his MRI.  He is feeling well.  No abdominal pain, nausea or vomiting.  No change in appetite or unintentional weight loss.  MRI from January 25, 2024 revealed stable pancreas cyst.  The largest was 2.2 cm.  There were no worrisome or suspicious features.  I personally reviewed the films.    Review of Systems  Complete ROS Surg Onc:   Complete ROS Surg Onc:   Constitutional: The patient denies new or recent history of general fatigue, no recent weight loss, no change in appetite.   Eyes: No complaints of visual problems, no scleral icterus.   ENT: no complaints of ear pain, no hoarseness, no difficulty swallowing,  no tinnitus and no new masses in head, oral cavity, or neck.   Cardiovascular: No complaints of chest pain, no palpitations, no ankle edema.   Respiratory: No complaints of shortness of breath, no cough.   Gastrointestinal: No complaints of jaundice, no bloody stools, no pale stools.   Genitourinary: No complaints of dysuria, no hematuria, no nocturia, no frequent urination, no urethral discharge.   Musculoskeletal: No complaints of weakness, paralysis, joint stiffness or arthralgias.  Integumentary: No complaints of rash, no new lesions.   Neurological: No complaints of convulsions, no seizures, no dizziness.   Hematologic/Lymphatic: No complaints of easy bruising.   Endocrine:  No hot or cold intolerance.  No polydipsia, polyphagia, or polyuria.  Allergy/immunology:  No environmental allergies.  No food allergies.  Not immunocompromised.  Skin:  No pallor or rash.  No wound.        Patient Active Problem List   Diagnosis   • Kidney capsule rupture, left, initial encounter   • Type 2 diabetes mellitus (HCC)   • CAD (coronary artery disease)   • Seizure disorder (HCC)   • Leukemia (HCC)   • Splenic hemorrhage   • Paroxysmal atrial fibrillation (HCC)   • Elevated troponin   • Coagulopathy (HCC)   • Acute respiratory insufficiency   • Acute blood loss anemia   •  Hyperbilirubinemia   • Pancreatic cyst     Past Medical History:   Diagnosis Date   • CAD (coronary artery disease)    • DM (diabetes mellitus) (HCC)    • Leukemia (HCC)    • Seizure disorder (HCC)    • Stroke (HCC)     partial vision left eye     Past Surgical History:   Procedure Laterality Date   • BACK SURGERY     • CORONARY ARTERY BYPASS GRAFT     • HERNIA REPAIR     • IR EMBOLIZATION (SPECIFY VESSEL OR SITE)  05/03/2023   • IR EMBOLIZATION (SPECIFY VESSEL OR SITE)  05/03/2023     No family history on file.  Social History     Socioeconomic History   • Marital status: /Civil Union     Spouse name: Not on file   • Number of children: Not on file   • Years of education: Not on file   • Highest education level: Not on file   Occupational History   • Not on file   Tobacco Use   • Smoking status: Never   • Smokeless tobacco: Never   Vaping Use   • Vaping status: Never Used   Substance and Sexual Activity   • Alcohol use: Not Currently   • Drug use: Not Currently   • Sexual activity: Not on file   Other Topics Concern   • Not on file   Social History Narrative   • Not on file     Social Determinants of Health     Financial Resource Strain: Not on file   Food Insecurity: Not on file   Transportation Needs: No Transportation Needs (5/4/2023)    PRAPARE - Transportation    • Lack of Transportation (Medical): No    • Lack of Transportation (Non-Medical): No   Physical Activity: Not on file   Stress: Not on file   Social Connections: Not on file   Intimate Partner Violence: Not on file   Housing Stability: Not on file       Current Outpatient Medications:   •  carbidopa-levodopa (SINEMET)  mg per tablet, Take 1.5 tablets by mouth 3 (three) times a day, Disp: 30 tablet, Rfl: 0  •  ezetimibe-simvastatin (VYTORIN) 10-40 mg per tablet, Take 1 tablet by mouth daily at bedtime, Disp: , Rfl:   •  glimepiride (AMARYL) 2 mg tablet, Take 2 mg by mouth every morning before breakfast, Disp: , Rfl:   •  lacosamide  (VIMPAT) 100 mg tablet, Take 100 mg by mouth every 12 (twelve) hours, Disp: , Rfl:   •  metFORMIN (GLUCOPHAGE-XR) 500 mg 24 hr tablet, Take 500 mg by mouth 2 (two) times a day with meals Per [facility] Endocrinology office visit 4/21/23:, Disp: , Rfl:   •  metoprolol tartrate (LOPRESSOR) 25 mg tablet, Take 25 mg by mouth every 12 (twelve) hours, Disp: , Rfl:   •  OneTouch Verio test strip, , Disp: , Rfl:   •  OYSCO 500 + D 500-5 MG-MCG TABS, TAKE 1 TABLET BY MOUTH DAILY. START 04/21/23, Disp: , Rfl:   •  acetaminophen (TYLENOL) 325 mg tablet, Take 2 tablets (650 mg total) by mouth every 6 (six) hours as needed for mild pain or fever (Patient not taking: Reported on 6/27/2023), Disp: 30 tablet, Rfl: 0  •  calcium carbonate-vitamin D 500 mg-5 mcg per tablet, Take 1 tablet by mouth 2 (two) times a day with meals. Per [facility] Endocrinology office visit 4/21/23:  Indications: ., Disp: , Rfl:   •  clopidogrel (Plavix) 75 mg tablet, Take 1 tablet by mouth daily, Disp: , Rfl:   •  doxycycline hyclate (VIBRAMYCIN) 100 mg capsule, , Disp: , Rfl:   •  lisinopril (ZESTRIL) 5 mg tablet, Take 1 tablet by mouth daily, Disp: , Rfl:   •  metFORMIN (GLUCOPHAGE-XR) 500 mg 24 hr tablet, Take 1 tablet by mouth 2 (two) times a day with meals (Patient not taking: Reported on 7/25/2023), Disp: , Rfl:   •  Xarelto 20 MG tablet, , Disp: , Rfl:   Allergies   Allergen Reactions   • Sucralose - Food Allergy Headache   • Phenytoin Rash     Red  Jason up arm to head       Vitals:    02/16/24 0822   BP: 162/70   Pulse: 62   Temp: 99.2 °F (37.3 °C)   SpO2: 95%       Physical Exam  Constitutional: General appearance: The Patient is well-developed and well-nourished who appears the stated age in no acute distress. Patient is pleasant and talkative.     HEENT:  Normocephalic.  Sclerae are anicteric. Mucous membranes are moist. Neck is supple without adenopathy. No JVD.     Chest: The lungs are clear to auscultation.     Cardiac: Heart is regular rate.    "  Abdomen: Abdomen is soft, non-tender, non-distended and without masses.     Extremities: There is no clubbing or cyanosis. There is no edema.  Symmetric.  Neuro: Grossly nonfocal. Gait is normal.     Lymphatic: No evidence of cervical adenopathy bilaterally.   No evidence of axillary adenopathy bilaterally.    Skin: Warm, anicteric.    Psych:  Patient is pleasant and talkative.  Breasts:        Pathology:  [unfilled]    Labs:      Imaging  XR knee 4+ vw right injury    Result Date: 2/11/2024  Narrative: History: 73-year-old male status post fall with right knee pain. FINDINGS: Total of 5 images of right knee were obtained on 2/11/2024. No prior study is available for comparison. No definite evidence of acute fracture or joint dislocation is seen. Moderate sized suprapatellar joint effusion is seen. There are mild narrowing of medial and lateral knee compartment with mild chondrocalcinosis. There is severe narrowing of patellofemoral joint with prominent suprapatella and infrapatellar spur. The rest of the visualized bony structures are unremarkable. Small vascular calcifications are seen in distal right thigh and proximal right leg. Few surgical clips are seen at posteromedial right thigh.    Impression: IMPRESSION: 1. No definite evidence acute fracture or joint dislocation. Moderate sized suprapatellar joint effusion. 2. Significant degenerative changes and mild vascular calcifications. Workstation:[id number]    MRI abdomen w wo contrast and mrcp    Result Date: 2/4/2024  Narrative: MRI OF THE ABDOMEN WITH AND WITHOUT CONTRAST WITH MRCP INDICATION: 73 years / Male. K86.2: Cyst of pancreas. Excerpt from Surgical Oncology progress note dated 7/25/2023:  \" 73-year-old male with a family history of pancreas cancer and multiple pancreas cysts.  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  The cyst that was biopsied has a statistically higher risk based  on Pancragen.  We discussed the risk of malignancy in his lifetime is 10 " "to 20% with sidebranch IPMN and a less than 10% risk of developing a malignancy elsewhere in the pancreas.  On his most recent MRI there was no nodularity or enhancing nodule.  By EUS there was no nodularity as well.  I have recommended observation.  Given the family history of pancreas cancer I will plan on repeating his MRI in 6 month\" COMPARISON: MRI abdomen 7/15/2023 TECHNIQUE: Multiplanar/multisequence MRI of the abdomen with 3D MRCP was performed before and after administration of contrast. IV Contrast: 8 mL of Gadobutrol injection (SINGLE-DOSE) FINDINGS: LOWER CHEST: Unchanged left basilar effusion and associated consolidation. LIVER: Normal in size and configuration. No suspicious mass. Scattered simple cysts. Patent hepatic and portal veins. BILE DUCTS: No intrahepatic or extrahepatic bile duct dilation. Common bile duct is normal in caliber. No choledocholithiasis, biliary stricture or suspicious mass. GALLBLADDER: Cholelithiasis without findings of acute cholecystitis. PANCREAS: Unchanged scattered pancreatic cysts measuring up to 2.2 cm #6/23. No new pancreatic findings. No pancreatic ductal dilation. ADRENAL GLANDS: Unremarkable. SPLEEN: Unchanged 2.3 cm splenic hemangioma #6/14. KIDNEYS/PROXIMAL URETERS: No hydroureteronephrosis. No suspicious renal mass. Scattered simple/proteinaceous cysts. Decreased left perinephric hematoma now measuring 2.6 cm in thickness. Prominent left renal atrophy. BOWEL: No dilated loops of bowel. PERITONEUM/RETROPERITONEUM: No ascites. LYMPH NODES: No abdominal lymphadenopathy. VESSELS: No aneurysm. ABDOMINAL WALL: Unremarkable BONES: No suspicious osseous lesion.     Impression: Innumerable pancreatic parenchymal cysts measuring up to 2.2 cm unchanged from 7/15/2023. The dominant lesion is status post FNA. Follow-up interval as per surgical oncology. The study was marked in EPIC for significant notification. Workstation performed: [id number]     I personally reviewed and " interpreted the above laboratory and imaging data.    Discussion/Summary: 73-year-old male with a family history of pancreas cancer and multiple pancreas cysts.  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  The cyst that was biopsied has a statistically higher risk based on Pancragen.  We discussed the risk of malignancy in his lifetime is 10 to 20% with sidebranch IPMN and a less than 10% risk of developing a malignancy elsewhere in the pancreas.  On his most recent MRI there was no nodularity or enhancing nodule.  The cyst is stable in size.  By EUS there was no nodularity as well.  I have recommended observation.  Given the family history of pancreas cancer I will plan on repeating his MRI in 6 months.  We will ensure 2 years of stability prior to once a year imaging.  He is agreeable to this plan.  All his questions were answered.

## 2024-02-29 ENCOUNTER — ESTABLISHED COMPREHENSIVE EXAM (OUTPATIENT)
Dept: URBAN - METROPOLITAN AREA CLINIC 6 | Facility: CLINIC | Age: 74
End: 2024-02-29

## 2024-02-29 DIAGNOSIS — H04.123: ICD-10-CM

## 2024-02-29 DIAGNOSIS — H27.8: ICD-10-CM

## 2024-02-29 DIAGNOSIS — E11.9: ICD-10-CM

## 2024-02-29 DIAGNOSIS — H43.813: ICD-10-CM

## 2024-02-29 DIAGNOSIS — H35.363: ICD-10-CM

## 2024-02-29 DIAGNOSIS — H35.373: ICD-10-CM

## 2024-02-29 DIAGNOSIS — H47.20: ICD-10-CM

## 2024-02-29 PROCEDURE — 92014 COMPRE OPH EXAM EST PT 1/>: CPT

## 2024-02-29 ASSESSMENT — VISUAL ACUITY
OD_PH: 20/25-2
OS_SC: 20/50-2
OS_PH: 20/40-2
OD_SC: 20/40-1

## 2024-02-29 ASSESSMENT — TONOMETRY
OD_IOP_MMHG: 11
OS_IOP_MMHG: 10

## 2024-08-12 ENCOUNTER — HOSPITAL ENCOUNTER (OUTPATIENT)
Dept: RADIOLOGY | Facility: HOSPITAL | Age: 74
Discharge: HOME | End: 2024-08-12
Attending: SURGERY
Payer: COMMERCIAL

## 2024-08-12 DIAGNOSIS — K86.2 PANCREATIC CYST: ICD-10-CM

## 2024-08-12 PROCEDURE — A9585 GADOBUTROL INJECTION: HCPCS | Performed by: SURGERY

## 2024-08-12 PROCEDURE — G1004 CDSM NDSC: HCPCS

## 2024-08-12 PROCEDURE — 74183 MRI ABD W/O CNTR FLWD CNTR: CPT

## 2024-08-12 RX ORDER — GADOBUTROL 604.72 MG/ML
9 INJECTION INTRAVENOUS
Status: COMPLETED | OUTPATIENT
Start: 2024-08-12 | End: 2024-08-12

## 2024-08-12 RX ADMIN — GADOBUTROL 9 ML: 604.72 INJECTION INTRAVENOUS at 20:02

## 2024-08-20 ENCOUNTER — OFFICE VISIT (OUTPATIENT)
Dept: SURGICAL ONCOLOGY | Facility: CLINIC | Age: 74
End: 2024-08-20
Payer: COMMERCIAL

## 2024-08-20 VITALS
SYSTOLIC BLOOD PRESSURE: 148 MMHG | WEIGHT: 196 LBS | HEART RATE: 68 BPM | DIASTOLIC BLOOD PRESSURE: 68 MMHG | TEMPERATURE: 98.5 F | HEIGHT: 70 IN | OXYGEN SATURATION: 98 % | BODY MASS INDEX: 28.06 KG/M2

## 2024-08-20 DIAGNOSIS — Z80.0 FAMILY HISTORY OF PANCREATIC CANCER: ICD-10-CM

## 2024-08-20 DIAGNOSIS — K86.2 PANCREATIC CYST: Primary | ICD-10-CM

## 2024-08-20 PROCEDURE — 99213 OFFICE O/P EST LOW 20 MIN: CPT

## 2024-08-20 PROCEDURE — G2211 COMPLEX E/M VISIT ADD ON: HCPCS

## 2024-08-20 RX ORDER — CEFUROXIME AXETIL 250 MG/1
TABLET ORAL
COMMUNITY
Start: 2024-06-19

## 2024-08-20 NOTE — ASSESSMENT & PLAN NOTE
Recent MRI does not show development of any suspicious features.  Largest cyst has apparently decreased, measuring 1.6cm at this time.  I have explained that we would like to establish 2 years of stability before moving out to annual surveillance.  Will repeat MRI closer to May and see him again at that time for another clinical exam.  Advised pt to call with any new or unusual symptoms, such as weight loss, change in appetite or new abdominal pain.

## 2024-08-20 NOTE — PROGRESS NOTES
Surgical Oncology Follow Up       [facility] [place]  [address] [address]  [phone]    Jonathan WOODY [facility] III  1950  [id number]  [facility] [place]  [address] [address]  [phone]    1. Pancreatic cyst  Assessment & Plan:  Recent MRI does not show development of any suspicious features.  Largest cyst has apparently decreased, measuring 1.6cm at this time.  I have explained that we would like to establish 2 years of stability before moving out to annual surveillance.  Will repeat MRI closer to May and see him again at that time for another clinical exam.  Advised pt to call with any new or unusual symptoms, such as weight loss, change in appetite or new abdominal pain.  Orders:  -     MRI abdomen w wo contrast and mrcp; Future; Expected date: 2025  -     BUN; Future; Expected date: 04/15/2025  -     Creatinine, serum; Future; Expected date: 04/15/2025  2. Family history of pancreatic cancer  Assessment & Plan:  Patient's mother  of pancreatic cancer. Once we have achieved 2 years of pancreas cyst stability on imaging, we will plan to continue with annual MRI surveillance indefinitely.         Chief Complaint   Patient presents with    Follow-up       Return in about 9 months (around 2025) for Office visit with Dr Jensen, Imaging - See orders.      Staging: Multiple pancreas cystic lesions discovered May 2021  Treatment history: EUS, 2023  cyst with irregular margins in the body of the pancreas.  This measured 3.6 x 3.2 cm.  There were other smaller cysts throughout the pancreas.    CEA in the cyst fluid was 6,366 ng/mL   amylase was 6,2002 U/L   glucose in the cyst fluid was less than 4.3 mg/dL.  Cyst fluid had scant clusters of mucinous cells.  There is no evidence of obvious nodularity seen.   Cells were atypical  on pathology  Statistically higher risk by Pancragen  Current treatment: Observation given his family history of pancreas cancer  Disease status: BIANCA      History of Present Illness: This is a 75 y/o male who returns to the office today in for pancreatic cyst surveillance.  He has no complaints at this time.  He denies any weight loss, abdominal pain, appetite changes or jaundice.  He continues to follow closely with urology, cardiology and neurology, and his wife states he has several appointment and scans scheduled between now and the end of the year.  MRI with MRCP was performed on August 12, and I have reviewed these results with the patient and his wife today.      Review of Systems   Constitutional:  Negative for activity change, appetite change, fatigue and unexpected weight change.   HENT: Negative.     Respiratory: Negative.     Cardiovascular: Negative.    Gastrointestinal: Negative.  Negative for abdominal distention, abdominal pain, diarrhea, nausea and vomiting.   Musculoskeletal: Negative.    Skin: Negative.  Negative for color change.   Hematological: Negative.    Psychiatric/Behavioral: Negative.               Patient Active Problem List   Diagnosis    Kidney capsule rupture, left, initial encounter    Type 2 diabetes mellitus (HCC)    CAD (coronary artery disease)    Seizure disorder (HCC)    Leukemia (HCC)    Splenic hemorrhage    Paroxysmal atrial fibrillation (HCC)    Elevated troponin    Coagulopathy (HCC)    Acute respiratory insufficiency    Acute blood loss anemia    Hyperbilirubinemia    Pancreatic cyst    Family history of pancreatic cancer     Past Medical History:   Diagnosis Date    CAD (coronary artery disease)     DM (diabetes mellitus) (HCC)     Leukemia (HCC)     Seizure disorder (HCC)     Stroke (HCC)     partial vision left eye     Past Surgical History:   Procedure Laterality Date    BACK SURGERY      CORONARY ARTERY BYPASS GRAFT      HERNIA REPAIR      IR EMBOLIZATION (SPECIFY  VESSEL OR SITE)  05/03/2023    IR EMBOLIZATION (SPECIFY VESSEL OR SITE)  05/03/2023     Family History   Problem Relation Age of Onset    Pancreatic cancer Mother     Kidney disease Father      Social History     Socioeconomic History    Marital status: /Civil Union     Spouse name: Not on file    Number of children: Not on file    Years of education: Not on file    Highest education level: Not on file   Occupational History    Not on file   Tobacco Use    Smoking status: Never    Smokeless tobacco: Never   Vaping Use    Vaping status: Never Used   Substance and Sexual Activity    Alcohol use: Not Currently    Drug use: Not Currently    Sexual activity: Not on file   Other Topics Concern    Not on file   Social History Narrative    Not on file     Social Determinants of Health     Financial Resource Strain: Not on file   Food Insecurity: Not on file   Transportation Needs: No Transportation Needs (5/4/2023)    PRAPARE - Transportation     Lack of Transportation (Medical): No     Lack of Transportation (Non-Medical): No   Physical Activity: Not on file   Stress: Not on file   Social Connections: Not on file   Intimate Partner Violence: Not on file   Housing Stability: Not on file       Current Outpatient Medications:     ezetimibe-simvastatin (VYTORIN) 10-40 mg per tablet, Take 1 tablet by mouth daily at bedtime, Disp: , Rfl:     glimepiride (AMARYL) 2 mg tablet, Take 2 mg by mouth every morning before breakfast, Disp: , Rfl:     lacosamide (VIMPAT) 100 mg tablet, Take 100 mg by mouth every 12 (twelve) hours, Disp: , Rfl:     metFORMIN (GLUCOPHAGE-XR) 500 mg 24 hr tablet, Take 500 mg by mouth 2 (two) times a day with meals Per [facility] Endocrinology office visit 4/21/23:, Disp: , Rfl:     metoprolol tartrate (LOPRESSOR) 25 mg tablet, Take 25 mg by mouth every 12 (twelve) hours, Disp: , Rfl:     OneTouch Verio test strip, , Disp: , Rfl:     OYSCO 500 + D 500-5 MG-MCG TABS, TAKE 1 TABLET BY MOUTH DAILY. START  04/21/23, Disp: , Rfl:     acetaminophen (TYLENOL) 325 mg tablet, Take 2 tablets (650 mg total) by mouth every 6 (six) hours as needed for mild pain or fever (Patient not taking: Reported on 6/27/2023), Disp: 30 tablet, Rfl: 0    calcium carbonate-vitamin D 500 mg-5 mcg per tablet, Take 1 tablet by mouth 2 (two) times a day with meals. Per [facility] Endocrinology office visit 4/21/23:  Indications: ., Disp: , Rfl:     carbidopa-levodopa (SINEMET)  mg per tablet, Take 1.5 tablets by mouth 3 (three) times a day, Disp: 30 tablet, Rfl: 0    cefuroxime (CEFTIN) 250 mg tablet, , Disp: , Rfl:     clopidogrel (Plavix) 75 mg tablet, Take 1 tablet by mouth daily, Disp: , Rfl:     doxycycline hyclate (VIBRAMYCIN) 100 mg capsule, , Disp: , Rfl:     lisinopril (ZESTRIL) 5 mg tablet, Take 1 tablet by mouth daily, Disp: , Rfl:     metFORMIN (GLUCOPHAGE-XR) 500 mg 24 hr tablet, Take 1 tablet by mouth 2 (two) times a day with meals (Patient not taking: Reported on 7/25/2023), Disp: , Rfl:     Xarelto 20 MG tablet, , Disp: , Rfl:   Allergies   Allergen Reactions    Sucralose - Food Allergy Headache    Phenytoin Rash     Red  Jason up arm to head       Vitals:    08/20/24 1428   BP: 148/68   Pulse: 68   Temp: 98.5 °F (36.9 °C)   SpO2: 98%       Physical Exam  Vitals reviewed.   Constitutional:       General: He is not in acute distress.     Appearance: Normal appearance. He is normal weight. He is not ill-appearing or toxic-appearing.   HENT:      Head: Normocephalic and atraumatic.      Nose: Nose normal.      Mouth/Throat:      Mouth: Mucous membranes are moist.   Eyes:      General: No scleral icterus.  Cardiovascular:      Rate and Rhythm: Normal rate.   Pulmonary:      Effort: Pulmonary effort is normal.   Abdominal:      Palpations: Abdomen is soft.   Musculoskeletal:         General: Normal range of motion.      Cervical back: Normal range of motion and neck supple.   Skin:     General: Skin is warm and dry.   Neurological:       General: No focal deficit present.      Mental Status: He is alert and oriented to person, place, and time.   Psychiatric:         Mood and Affect: Mood normal.         Behavior: Behavior normal.         Thought Content: Thought content normal.         Judgment: Judgment normal.               Imaging  MRI abdomen w wo contrast and mrcp    Result Date: 8/14/2024  Narrative: MRI OF THE ABDOMEN WITH AND WITHOUT CONTRAST WITH MRCP INDICATION: 74 years / Male. K86.2: Cyst of pancreas. Per prior report, history of FNA. COMPARISON: 1/25/2024, 7/15/2023 and 5/9/2023 abdomen MRIs. 5/7/2023 chest CT. TECHNIQUE: Multiplanar/multisequence MRI of the abdomen with 3D MRCP was performed before and after administration of contrast. IV Contrast: 9 mL of Gadobutrol injection (SINGLE-DOSE) FINDINGS: Image quality:  Motion degrades images. FINDINGS: LOWER CHEST:   Small, chronic left pleural effusion with evidence of left lung volume loss. LIVER: Normal signal intensity and morphology. No suspicious mass. Tiny right lobe cyst. Patent hepatic and portal veins. BILE DUCTS:  Normal caliber and morphology. GALLBLADDER: Gallstones.  No secondary signs of cholecystitis. SPLEEN: Chronic 2.3 cm hemangioma. PANCREAS: Similar numerous, diffuse cysts communicating with the main duct, typical of sidebranch intraductal papillary mucinous neoplasm and/or secondary to chronic pancreatitis. Largest cyst is 1.6 cm by this reader's measurements today. Normal main duct caliber and morphology. No abnormal enhancement. ADRENAL GLANDS:  Within normal limits. KIDNEYS/PROXIMAL URETERS: Similar simple right cortical cysts. Chronic, decreasing 4.6 x 4.2 cm depth by width left perinephric hematoma. Chronic atrophy left kidney without parenchymal enhancement. No hydronephrosis. BOWEL:   Within normal limits. PERITONEUM/RETROPERITONEUM:  No ascites or fluid collections. LYMPH NODES:  No abdominal lymphadenopathy. VASCULAR STRUCTURES: Normal aortic caliber and  enhancement. Only the proximal left renal artery enhances. No new finding. BONES: Degenerative and surgical changes. ABDOMINAL WALL: Sternotomy. VISUALIZED PELVIC STRUCTURES: Within normal limits.     Impression: Persistent, unchanged numerous pancreatic cysts. Per ACR criteria, annual reimaging is recommended to 5-year stability and then every 2 years x 2. Other stable and nonemergent findings above. Workstation performed: [id number]     I personally reviewed and interpreted the above imaging data.

## 2024-08-20 NOTE — LETTER
2024     Luis Jensen MD  [address] [facility] [facility]  2nd Floor  [facility] [address]    Patient: Jonathan Ordaz III   YOB: 1950   Date of Visit: 2024       Dear Dr. Jensen:    Thank you for referring Jonathan Ordaz to me for evaluation. Below are my notes for this consultation.    If you have questions, please do not hesitate to call me. I look forward to following your patient along with you.         Sincerely,        KIANNA Shaffer        CC: No Recipients    KIANNA Shaffer  2024  3:04 PM  Sign when Signing Visit               Surgical Oncology Follow Up       [facility] [place]  [address] [facility] [facility] [address]  [phone]    Jonathan Ordaz III  1950  [id number]  [facility] [place]  [address] [facility] [address] [facility] [address]  [phone]    1. Pancreatic cyst  Assessment & Plan:  Recent MRI does not show development of any suspicious features.  Largest cyst has apparently decreased, measuring 1.6cm at this time.  I have explained that we would like to establish 2 years of stability before moving out to annual surveillance.  Will repeat MRI in May and see him again at that time for another clinical exam.  Advised pt to call with any new or unusual symptoms, such as weight loss, change in appetite or new abdominal pain.  Orders:  -     MRI abdomen w wo contrast and mrcp; Future; Expected date: 2025  -     BUN; Future; Expected date: 04/15/2025  -     Creatinine, serum; Future; Expected date: 04/15/2025  2. Family history of pancreatic cancer  Assessment & Plan:  Patient's mother  of pancreatic cancer. Once we have achieved 2 years of pancreas cyst stability on imaging, we will plan to continue with annual MRI surveillance indefinitely.         Chief Complaint   Patient presents with   • Follow-up       Return in about 9 months (around  5/20/2025) for Office visit with Dr Jensen, Imaging - See orders.      Staging: Multiple pancreas cystic lesions discovered May 2021  Treatment history: EUS, June 2023  cyst with irregular margins in the body of the pancreas.  This measured 3.6 x 3.2 cm.  There were other smaller cysts throughout the pancreas.    CEA in the cyst fluid was 6,366 ng/mL   amylase was 6,2002 U/L   glucose in the cyst fluid was less than 4.3 mg/dL.  Cyst fluid had scant clusters of mucinous cells.  There is no evidence of obvious nodularity seen.   Cells were atypical on pathology  Statistically higher risk by Pancanushka  Current treatment: Observation given his family history of pancreas cancer  Disease status: BIANCA      History of Present Illness: This is a 73 y/o male who returns to the office today in for pancreatic cyst surveillance.  He has no complaints at this time.  He denies any weight loss, abdominal pain, appetite changes or jaundice.  He continues to follow closely with urology, cardiology and neurology, and his wife states he has several appointment and scans scheduled between now and the end of the year.  MRI with MRCP was performed on August 12, and I have reviewed these results with the patient and his wife today.      Review of Systems   Constitutional:  Negative for activity change, appetite change, fatigue and unexpected weight change.   HENT: Negative.     Respiratory: Negative.     Cardiovascular: Negative.    Gastrointestinal: Negative.  Negative for abdominal distention, abdominal pain, diarrhea, nausea and vomiting.   Musculoskeletal: Negative.    Skin: Negative.  Negative for color change.   Hematological: Negative.    Psychiatric/Behavioral: Negative.               Patient Active Problem List   Diagnosis   • Kidney capsule rupture, left, initial encounter   • Type 2 diabetes mellitus (HCC)   • CAD (coronary artery disease)   • Seizure disorder (HCC)   • Leukemia (HCC)   • Splenic hemorrhage   • Paroxysmal atrial  fibrillation (HCC)   • Elevated troponin   • Coagulopathy (HCC)   • Acute respiratory insufficiency   • Acute blood loss anemia   • Hyperbilirubinemia   • Pancreatic cyst   • Family history of pancreatic cancer     Past Medical History:   Diagnosis Date   • CAD (coronary artery disease)    • DM (diabetes mellitus) (HCC)    • Leukemia (HCC)    • Seizure disorder (HCC)    • Stroke (HCC)     partial vision left eye     Past Surgical History:   Procedure Laterality Date   • BACK SURGERY     • CORONARY ARTERY BYPASS GRAFT     • HERNIA REPAIR     • IR EMBOLIZATION (SPECIFY VESSEL OR SITE)  05/03/2023   • IR EMBOLIZATION (SPECIFY VESSEL OR SITE)  05/03/2023     Family History   Problem Relation Age of Onset   • Pancreatic cancer Mother    • Kidney disease Father      Social History     Socioeconomic History   • Marital status: /Civil Union     Spouse name: Not on file   • Number of children: Not on file   • Years of education: Not on file   • Highest education level: Not on file   Occupational History   • Not on file   Tobacco Use   • Smoking status: Never   • Smokeless tobacco: Never   Vaping Use   • Vaping status: Never Used   Substance and Sexual Activity   • Alcohol use: Not Currently   • Drug use: Not Currently   • Sexual activity: Not on file   Other Topics Concern   • Not on file   Social History Narrative   • Not on file     Social Determinants of Health     Financial Resource Strain: Not on file   Food Insecurity: Not on file   Transportation Needs: No Transportation Needs (5/4/2023)    PRAPARE - Transportation    • Lack of Transportation (Medical): No    • Lack of Transportation (Non-Medical): No   Physical Activity: Not on file   Stress: Not on file   Social Connections: Not on file   Intimate Partner Violence: Not on file   Housing Stability: Not on file       Current Outpatient Medications:   •  ezetimibe-simvastatin (VYTORIN) 10-40 mg per tablet, Take 1 tablet by mouth daily at bedtime, Disp: , Rfl:    •  glimepiride (AMARYL) 2 mg tablet, Take 2 mg by mouth every morning before breakfast, Disp: , Rfl:   •  lacosamide (VIMPAT) 100 mg tablet, Take 100 mg by mouth every 12 (twelve) hours, Disp: , Rfl:   •  metFORMIN (GLUCOPHAGE-XR) 500 mg 24 hr tablet, Take 500 mg by mouth 2 (two) times a day with meals Per [facility] office visit 4/21/23:, Disp: , Rfl:   •  metoprolol tartrate (LOPRESSOR) 25 mg tablet, Take 25 mg by mouth every 12 (twelve) hours, Disp: , Rfl:   •  OneTouch Verio test strip, , Disp: , Rfl:   •  OYSCO 500 + D 500-5 MG-MCG TABS, TAKE 1 TABLET BY MOUTH DAILY. START 04/21/23, Disp: , Rfl:   •  acetaminophen (TYLENOL) 325 mg tablet, Take 2 tablets (650 mg total) by mouth every 6 (six) hours as needed for mild pain or fever (Patient not taking: Reported on 6/27/2023), Disp: 30 tablet, Rfl: 0  •  calcium carbonate-vitamin D 500 mg-5 mcg per tablet, Take 1 tablet by mouth 2 (two) times a day with meals. Per [facility] office visit 4/21/23:  Indications: ., Disp: , Rfl:   •  carbidopa-levodopa (SINEMET)  mg per tablet, Take 1.5 tablets by mouth 3 (three) times a day, Disp: 30 tablet, Rfl: 0  •  cefuroxime (CEFTIN) 250 mg tablet, , Disp: , Rfl:   •  clopidogrel (Plavix) 75 mg tablet, Take 1 tablet by mouth daily, Disp: , Rfl:   •  doxycycline hyclate (VIBRAMYCIN) 100 mg capsule, , Disp: , Rfl:   •  lisinopril (ZESTRIL) 5 mg tablet, Take 1 tablet by mouth daily, Disp: , Rfl:   •  metFORMIN (GLUCOPHAGE-XR) 500 mg 24 hr tablet, Take 1 tablet by mouth 2 (two) times a day with meals (Patient not taking: Reported on 7/25/2023), Disp: , Rfl:   •  Xarelto 20 MG tablet, , Disp: , Rfl:   Allergies   Allergen Reactions   • Sucralose - Food Allergy Headache   • Phenytoin Rash     Red  Jason up arm to head       Vitals:    08/20/24 1428   BP: 148/68   Pulse: 68   Temp: 98.5 °F (36.9 °C)   SpO2: 98%       Physical Exam  Vitals reviewed.   Constitutional:       General: He is not in acute  distress.     Appearance: Normal appearance. He is normal weight. He is not ill-appearing or toxic-appearing.   HENT:      Head: Normocephalic and atraumatic.      Nose: Nose normal.      Mouth/Throat:      Mouth: Mucous membranes are moist.   Eyes:      General: No scleral icterus.  Cardiovascular:      Rate and Rhythm: Normal rate.   Pulmonary:      Effort: Pulmonary effort is normal.   Abdominal:      Palpations: Abdomen is soft.   Musculoskeletal:         General: Normal range of motion.      Cervical back: Normal range of motion and neck supple.   Skin:     General: Skin is warm and dry.   Neurological:      General: No focal deficit present.      Mental Status: He is alert and oriented to person, place, and time.   Psychiatric:         Mood and Affect: Mood normal.         Behavior: Behavior normal.         Thought Content: Thought content normal.         Judgment: Judgment normal.               Imaging  MRI abdomen w wo contrast and mrcp    Result Date: 8/14/2024  Narrative: MRI OF THE ABDOMEN WITH AND WITHOUT CONTRAST WITH MRCP INDICATION: 74 years / Male. K86.2: Cyst of pancreas. Per prior report, history of FNA. COMPARISON: 1/25/2024, 7/15/2023 and 5/9/2023 abdomen MRIs. 5/7/2023 chest CT. TECHNIQUE: Multiplanar/multisequence MRI of the abdomen with 3D MRCP was performed before and after administration of contrast. IV Contrast: 9 mL of Gadobutrol injection (SINGLE-DOSE) FINDINGS: Image quality:  Motion degrades images. FINDINGS: LOWER CHEST:   Small, chronic left pleural effusion with evidence of left lung volume loss. LIVER: Normal signal intensity and morphology. No suspicious mass. Tiny right lobe cyst. Patent hepatic and portal veins. BILE DUCTS:  Normal caliber and morphology. GALLBLADDER: Gallstones.  No secondary signs of cholecystitis. SPLEEN: Chronic 2.3 cm hemangioma. PANCREAS: Similar numerous, diffuse cysts communicating with the main duct, typical of sidebranch intraductal papillary mucinous  neoplasm and/or secondary to chronic pancreatitis. Largest cyst is 1.6 cm by this reader's measurements today. Normal main duct caliber and morphology. No abnormal enhancement. ADRENAL GLANDS:  Within normal limits. KIDNEYS/PROXIMAL URETERS: Similar simple right cortical cysts. Chronic, decreasing 4.6 x 4.2 cm depth by width left perinephric hematoma. Chronic atrophy left kidney without parenchymal enhancement. No hydronephrosis. BOWEL:   Within normal limits. PERITONEUM/RETROPERITONEUM:  No ascites or fluid collections. LYMPH NODES:  No abdominal lymphadenopathy. VASCULAR STRUCTURES: Normal aortic caliber and enhancement. Only the proximal left renal artery enhances. No new finding. BONES: Degenerative and surgical changes. ABDOMINAL WALL: Sternotomy. VISUALIZED PELVIC STRUCTURES: Within normal limits.     Impression: Persistent, unchanged numerous pancreatic cysts. Per ACR criteria, annual reimaging is recommended to 5-year stability and then every 2 years x 2. Other stable and nonemergent findings above. Workstation performed: [id number]     I personally reviewed and interpreted the above imaging data.

## 2024-08-20 NOTE — ASSESSMENT & PLAN NOTE
Patient's mother  of pancreatic cancer. Once we have achieved 2 years of pancreas cyst stability on imaging, we will plan to continue with annual MRI surveillance indefinitely.

## 2025-03-19 ENCOUNTER — ESTABLISHED COMPREHENSIVE EXAM (OUTPATIENT)
Dept: URBAN - METROPOLITAN AREA CLINIC 6 | Facility: CLINIC | Age: 75
End: 2025-03-19

## 2025-03-19 DIAGNOSIS — H04.123: ICD-10-CM

## 2025-03-19 DIAGNOSIS — H43.813: ICD-10-CM

## 2025-03-19 DIAGNOSIS — H35.373: ICD-10-CM

## 2025-03-19 DIAGNOSIS — H35.363: ICD-10-CM

## 2025-03-19 DIAGNOSIS — E11.9: ICD-10-CM

## 2025-03-19 DIAGNOSIS — H47.20: ICD-10-CM

## 2025-03-19 DIAGNOSIS — H27.8: ICD-10-CM

## 2025-03-19 PROCEDURE — 92014 COMPRE OPH EXAM EST PT 1/>: CPT

## 2025-03-19 PROCEDURE — 92134 CPTRZ OPH DX IMG PST SGM RTA: CPT

## 2025-03-19 ASSESSMENT — TONOMETRY
OS_IOP_MMHG: 11
OD_IOP_MMHG: 15

## 2025-03-19 ASSESSMENT — VISUAL ACUITY
OD_SC: 20/30-2
OS_SC: 20/40+1

## 2025-05-02 ENCOUNTER — HOSPITAL ENCOUNTER (OUTPATIENT)
Dept: RADIOLOGY | Facility: HOSPITAL | Age: 75
Discharge: HOME | End: 2025-05-02
Payer: COMMERCIAL

## 2025-05-02 DIAGNOSIS — K86.2 PANCREATIC CYST: ICD-10-CM

## 2025-05-02 PROCEDURE — A9585 GADOBUTROL INJECTION: HCPCS

## 2025-05-02 PROCEDURE — 74183 MRI ABD W/O CNTR FLWD CNTR: CPT

## 2025-05-02 RX ORDER — GADOBUTROL 604.72 MG/ML
8 INJECTION INTRAVENOUS
Status: COMPLETED | OUTPATIENT
Start: 2025-05-02 | End: 2025-05-02

## 2025-05-02 RX ADMIN — GADOBUTROL 8 ML: 604.72 INJECTION INTRAVENOUS at 17:52

## 2025-05-05 ENCOUNTER — TELEPHONE (OUTPATIENT)
Dept: SURGICAL ONCOLOGY | Facility: CLINIC | Age: 75
End: 2025-05-05

## 2025-05-05 NOTE — TELEPHONE ENCOUNTER
Called patient and spoke Laurie (Spouse) we were able to get patient recheduled for his office visit with Dr. Jensen as he will be in the OR that day. I confirmed the new date, time and location with the patient.

## 2025-05-16 ENCOUNTER — TELEPHONE (OUTPATIENT)
Dept: SURGICAL ONCOLOGY | Facility: CLINIC | Age: 75
End: 2025-05-16

## 2025-05-16 NOTE — TELEPHONE ENCOUNTER
Called patient and left message with the [facility] number to call back so that we can reschedule his office visit with Dr. Jensen as there have been some changes to his schedule. I did offer a sooner appointment on May 21 at the same time.

## 2025-05-19 ENCOUNTER — TELEPHONE (OUTPATIENT)
Dept: HEMATOLOGY ONCOLOGY | Facility: CLINIC | Age: 75
End: 2025-05-19

## 2025-05-19 NOTE — TELEPHONE ENCOUNTER
PT came in to confirm appt rescheduled for 5/21 with Dr. Jensen.  Could not get the number left in the VM so PT came in.  Appt has been scheduled for PT.

## 2025-05-21 ENCOUNTER — OFFICE VISIT (OUTPATIENT)
Dept: SURGICAL ONCOLOGY | Facility: CLINIC | Age: 75
End: 2025-05-21
Payer: COMMERCIAL

## 2025-05-21 VITALS
TEMPERATURE: 98.6 F | OXYGEN SATURATION: 98 % | SYSTOLIC BLOOD PRESSURE: 132 MMHG | HEIGHT: 70 IN | HEART RATE: 52 BPM | DIASTOLIC BLOOD PRESSURE: 82 MMHG | RESPIRATION RATE: 18 BRPM | WEIGHT: 208 LBS | BODY MASS INDEX: 29.78 KG/M2

## 2025-05-21 DIAGNOSIS — K86.2 PANCREATIC CYST: Primary | ICD-10-CM

## 2025-05-21 PROCEDURE — 99214 OFFICE O/P EST MOD 30 MIN: CPT | Performed by: SURGERY

## 2025-05-21 NOTE — ASSESSMENT & PLAN NOTE
75-year-old male with a family history of pancreas cancer and multiple pancreas cysts.  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  The cyst that was biopsied has a statistically higher risk based on Pancragen.  We discussed the risk of malignancy in his lifetime is 10 to 20% with sidebranch IPMN and a less than 10% risk of developing a malignancy elsewhere in the pancreas.  On his most recent MRI there was no nodularity or enhancing nodule.  The cyst is stable in size.  By EUS there was no nodularity as well.  I have recommended observation.  Given the family history of pancreas cancer I will plan on repeating his MRI yearly at this time.  This has several years of stability.  I will see him again in 1 year with a repeat MRI.  Should he develop any abdominal symptomatology or jaundice he will contact us and we will move the imaging up.  He is agreeable to this plan.  All his questions were answered.

## 2025-05-21 NOTE — LETTER
May 21, 2025     Austin Tripp DO  [address]   [address]  [facility] [address]    Patient: Jonathan Ordaz III   YOB: 1950   Date of Visit: 5/21/2025       Dear DO Nrei Tran MD:    Thank you for referring Jonathan Ordaz to me for evaluation. Below are my notes for this consultation.    If you have questions, please do not hesitate to call me. I look forward to following your patient along with you.         Sincerely,        Luis Jensen MD        CC: MD Luis Ceballos MD  5/21/2025 10:54 AM  Sign when Signing Visit               Surgical Oncology Follow Up       [facility] [place]  [address] [address]  [phone]    Jonathan Ordaz III  1950  [id number]  [facility]  [address] [address]  [phone]    1. Pancreatic cyst  Assessment & Plan:  75-year-old male with a family history of pancreas cancer and multiple pancreas cysts.  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  The cyst that was biopsied has a statistically higher risk based on Pancragen.  We discussed the risk of malignancy in his lifetime is 10 to 20% with sidebranch IPMN and a less than 10% risk of developing a malignancy elsewhere in the pancreas.  On his most recent MRI there was no nodularity or enhancing nodule.  The cyst is stable in size.  By EUS there was no nodularity as well.  I have recommended observation.  Given the family history of pancreas cancer I will plan on repeating his MRI yearly at this time.  This has several years of stability.  I will see him again in 1 year with a repeat MRI.  Should he develop any abdominal symptomatology or jaundice he will contact us and we will move the imaging up.  He is agreeable to this plan.  All his questions were answered.   Orders:  -     MRI abdomen w wo contrast and  mrcp; Future; Expected date: 05/01/2026  -     BUN; Future; Expected date: 05/01/2026  -     Creatinine, serum; Future; Expected date: 05/01/2026         Chief Complaint   Patient presents with   • Office Visit       Return in about 1 year (around 5/21/2026) for Ofice visit short, Imaging - See orders, with Danica.      Oncology History    No history exists.       Staging: Multiple pancreas cystic lesions discovered May 2021  Treatment history: EUS, June 2023  cyst with irregular margins in the body of the pancreas.  This measured 3.6 x 3.2 cm.  There were other smaller cysts throughout the pancreas.    CEA in the cyst fluid was 6,366 ng/mL   amylase was 6,2002 U/L   glucose in the cyst fluid was less than 4.3 mg/dL.  Cyst fluid had scant clusters of mucinous cells.  There is no evidence of obvious nodularity seen.   Cells were atypical on pathology  Statistically higher risk by Pancanushka  Current treatment: Observation given his family history of pancreas cancer  Disease status: BIANCA    History of Present Illness: Patient returns in follow-up.  He is feeling well.  No abdominal pain, nausea or vomiting.  No change in appetite or unintentional weight loss.  MRI from May 2, 2025 reveals stable cysts in the pancreas.  The largest was in the pancreatic body measuring 2.2 cm.  This did appear to communicate with the main pancreatic duct.  There was no ductal dilatation.  There were no high risk or worrisome features.  I personally reviewed the films.    Review of Systems  Complete ROS Surg Onc:   Complete ROS Surg Onc:   Constitutional: The patient denies new or recent history of general fatigue, no recent weight loss, no change in appetite.   Eyes: No complaints of visual problems, no scleral icterus.   ENT: no complaints of ear pain, no hoarseness, no difficulty swallowing,  no tinnitus and no new masses in head, oral cavity, or neck.   Cardiovascular: No complaints of chest pain, no palpitations, no ankle edema.    Respiratory: No complaints of shortness of breath, no cough.   Gastrointestinal: No complaints of jaundice, no bloody stools, no pale stools.   Genitourinary: No complaints of dysuria, no hematuria, no nocturia, no frequent urination, no urethral discharge.   Musculoskeletal: No complaints of weakness, paralysis, joint stiffness or arthralgias.  Integumentary: No complaints of rash, no new lesions.   Neurological: No complaints of convulsions, no seizures, no dizziness.   Hematologic/Lymphatic: No complaints of easy bruising.   Endocrine:  No hot or cold intolerance.  No polydipsia, polyphagia, or polyuria.  Allergy/immunology:  No environmental allergies.  No food allergies.  Not immunocompromised.  Skin:  No pallor or rash.  No wound.        Problem List[1]  Past Medical History:   Diagnosis Date   • CAD (coronary artery disease)    • DM (diabetes mellitus) (HCC)    • Leukemia (HCC)    • Seizure disorder (HCC)    • Stroke (HCC)     partial vision left eye     Past Surgical History:   Procedure Laterality Date   • BACK SURGERY     • CORONARY ARTERY BYPASS GRAFT     • HERNIA REPAIR     • IR EMBOLIZATION (SPECIFY VESSEL OR SITE)  05/03/2023   • IR EMBOLIZATION (SPECIFY VESSEL OR SITE)  05/03/2023     Family History   Problem Relation Age of Onset   • Pancreatic cancer Mother    • Kidney disease Father      Social History     Socioeconomic History   • Marital status: /Civil Union     Spouse name: Not on file   • Number of children: Not on file   • Years of education: Not on file   • Highest education level: Not on file   Occupational History   • Not on file   Tobacco Use   • Smoking status: Never   • Smokeless tobacco: Never   Vaping Use   • Vaping status: Never Used   Substance and Sexual Activity   • Alcohol use: Not Currently   • Drug use: Not Currently   • Sexual activity: Not on file   Other Topics Concern   • Not on file   Social History Narrative   • Not on file     Social Drivers of Health      Financial Resource Strain: Not on file   Food Insecurity: Not on file   Transportation Needs: No Transportation Needs (5/4/2023)    PRAPARE - Transportation    • Lack of Transportation (Medical): No    • Lack of Transportation (Non-Medical): No   Physical Activity: Not on file   Stress: Not on file   Social Connections: Not on file   Intimate Partner Violence: Not on file   Housing Stability: Not on file     Current Medications[2]  Allergies   Allergen Reactions   • Sucralose - Food Allergy Headache   • Phenytoin Rash     Red  Jason up arm to head       Vitals:    05/21/25 1028   BP: 132/82   Pulse: (!) 52   Resp: 18   Temp: 98.6 °F (37 °C)   SpO2: 98%       Physical Exam  Constitutional: General appearance: The Patient is well-developed and well-nourished who appears the stated age in no acute distress. Patient is pleasant and talkative.     HEENT:  Normocephalic.  Sclerae are anicteric. Mucous membranes are moist. Neck is supple without adenopathy. No JVD.     Chest: The lungs are clear to auscultation.     Cardiac: Heart is regular rate.     Abdomen: Abdomen is soft, non-tender, non-distended and without masses.     Extremities: There is no clubbing or cyanosis. There is no edema.  Symmetric.  Neuro: Grossly nonfocal. Gait is normal.     Lymphatic: No evidence of cervical adenopathy bilaterally.   No evidence of axillary adenopathy bilaterally.   Skin: Warm, anicteric.    Psych:  Patient is pleasant and talkative.  Breasts:        Pathology:  [unfilled]    Labs:      Imaging  MRI abdomen w wo contrast and mrcp  Result Date: 5/7/2025  Narrative: MRI OF THE ABDOMEN WITH AND WITHOUT CONTRAST WITH MRCP INDICATION: 75 years / Male. K86.2: Cyst of pancreas. COMPARISON: MR abdomen 8/12/2024 TECHNIQUE: Multiplanar/multisequence MRI of the abdomen with 3D MRCP was performed before and after administration of contrast. IV Contrast: 8 mL of Gadobutrol injection (SINGLE-DOSE) FINDINGS: LOWER CHEST: Prior median  sternotomy. Small left pleural effusion with subjacent atelectasis, decreased in size from 8/12/2024. Small hiatal hernia. Bilateral gynecomastia. LIVER: Normal in size and configuration. No suspicious mass. Scattered simple hepatic cysts. Patent hepatic and portal veins. BILE DUCTS: No intrahepatic or extrahepatic bile duct dilation. Common bile duct is normal in caliber. No choledocholithiasis, biliary stricture or suspicious mass. GALLBLADDER: Cholelithiasis without findings of acute cholecystitis. PANCREAS: Cystic pancreatic lesion as follows: -Dominant cyst size: Stable appearance of numerous cystic lesions throughout the pancreas, the largest in the pancreatic body measuring 2.2 cm (series 5 image 24). Unchanged. -Dominant cyst location: Pancreatic body. -Additional cysts: Numerous. -Communication with main pancreatic duct: Dominant cyst communicates with main pancreatic duct. Several of the cysts communicate with the main pancreatic duct. -Main duct dilation: None. -Type: Branch duct intraductal papillary mucinous neoplasm (BD-IPMN.) -High risk stigmata: None. -Worrisome features: None. (Please note the above only takes into account imaging high risk stigmata/worrisome features. Clinical/laboratory features should be assessed separately.) -Management recommendation: Follow-up 6 months once, then every 12 months. Currently, patient has demonstrated stability since 5/9/2023. Recommend next follow-up in 12 months. Endpoint determined by clinician. Preferred imaging modality: abdomen MRI and MRCP with and without IV contrast, or triple phase abdomen CT with IV contrast, or abdomen MRI and MRCP without IV contrast. Management and follow up recommendations for cystic pancreatic lesions are based on Institutional consensus and international evidence-based Kyoto guidelines for the management of intraductal papillary mucinous neoplasm of the pancreas. Pancreatology 24 (2024) 255-270. ADRENAL GLANDS: Unremarkable.  SPLEEN: Stable 2.4 cm splenic hemangioma. KIDNEYS/PROXIMAL URETERS: No hydroureteronephrosis. No suspicious renal mass. Simple right renal cysts. Similar chronic left renal atrophy. Slightly decreased size of chronic left perinephric hematoma measuring 1.6 cm in thickness, previously 2.0 cm (series 11 image 83). BOWEL: No dilated loops of bowel. PERITONEUM/RETROPERITONEUM: No ascites. LYMPH NODES: No abdominal lymphadenopathy. VESSELS: No aneurysm. ABDOMINAL WALL: Unremarkable. BONES: No suspicious osseous lesion. Spinal degenerative changes. Susceptibility effect from lumbar fusion hardware.     Impression: 1. No significant change in numerous cystic lesions throughout the pancreas measuring up to 2.2 cm, several of which communicate with the main pancreatic duct, likely sidebranch IPMNs. -Management recommendation: Follow-up 6 months once, then every 12 months. Currently, patient has demonstrated stability since 5/9/2023. Recommend next follow-up in 12 months. Endpoint determined by clinician. Preferred imaging modality: abdomen MRI and MRCP with and without IV contrast, or triple phase abdomen CT with IV contrast, or abdomen MRI and MRCP without IV contrast. Workstation performed: [id number]     I personally reviewed and interpreted the above laboratory and imaging data.               [1]   Patient Active Problem List  Diagnosis   • Kidney capsule rupture, left, initial encounter   • Type 2 diabetes mellitus (HCC)   • CAD (coronary artery disease)   • Seizure disorder (HCC)   • Leukemia (HCC)   • Splenic hemorrhage   • Paroxysmal atrial fibrillation (HCC)   • Elevated troponin   • Coagulopathy (HCC)   • Acute respiratory insufficiency   • Acute blood loss anemia   • Hyperbilirubinemia   • Pancreatic cyst   • Family history of pancreatic cancer   [2]   Current Outpatient Medications:   •  ezetimibe-simvastatin (VYTORIN) 10-40 mg per tablet, Take 1 tablet by mouth daily at bedtime, Disp: , Rfl:   •  glimepiride  (AMARYL) 2 mg tablet, Take 2 mg by mouth every morning before breakfast, Disp: , Rfl:   •  lacosamide (VIMPAT) 100 mg tablet, Take 100 mg by mouth every 12 (twelve) hours, Disp: , Rfl:   •  metFORMIN (GLUCOPHAGE-XR) 500 mg 24 hr tablet, Take 500 mg by mouth in the morning and 500 mg in the evening. Take with meals. Per [facility] office visit 4/21/23:, Disp: , Rfl:   •  metoprolol tartrate (LOPRESSOR) 25 mg tablet, Take 25 mg by mouth every 12 (twelve) hours, Disp: , Rfl:   •  OneTouch Verio test strip, , Disp: , Rfl:   •  OYSCO 500 + D 500-5 MG-MCG TABS, , Disp: , Rfl:   •  acetaminophen (TYLENOL) 325 mg tablet, Take 2 tablets (650 mg total) by mouth every 6 (six) hours as needed for mild pain or fever (Patient not taking: Reported on 6/27/2023), Disp: 30 tablet, Rfl: 0  •  calcium carbonate-vitamin D 500 mg-5 mcg per tablet, Take 1 tablet by mouth 2 (two) times a day with meals. Per [facility] office visit 4/21/23:  Indications: ., Disp: , Rfl:   •  carbidopa-levodopa (SINEMET)  mg per tablet, Take 1.5 tablets by mouth 3 (three) times a day, Disp: 30 tablet, Rfl: 0  •  cefuroxime (CEFTIN) 250 mg tablet, , Disp: , Rfl:   •  clopidogrel (Plavix) 75 mg tablet, Take 1 tablet by mouth daily, Disp: , Rfl:   •  doxycycline hyclate (VIBRAMYCIN) 100 mg capsule, , Disp: , Rfl:   •  lisinopril (ZESTRIL) 5 mg tablet, Take 1 tablet by mouth daily, Disp: , Rfl:   •  Xarelto 20 MG tablet, , Disp: , Rfl:

## 2025-05-21 NOTE — PROGRESS NOTES
Surgical Oncology Follow Up       [facility] [place]  [address] [facility] [address]  [phone]    Jonathan WOODY [facility] III  1950  [id number]  [facility] [place]  [address] [facility] [address]  [phone]    1. Pancreatic cyst  Assessment & Plan:  75-year-old male with a family history of pancreas cancer and multiple pancreas cysts.  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  The cyst that was biopsied has a statistically higher risk based on Pancragen.  We discussed the risk of malignancy in his lifetime is 10 to 20% with sidebranch IPMN and a less than 10% risk of developing a malignancy elsewhere in the pancreas.  On his most recent MRI there was no nodularity or enhancing nodule.  The cyst is stable in size.  By EUS there was no nodularity as well.  I have recommended observation.  Given the family history of pancreas cancer I will plan on repeating his MRI yearly at this time.  This has several years of stability.  I will see him again in 1 year with a repeat MRI.  Should he develop any abdominal symptomatology or jaundice he will contact us and we will move the imaging up.  He is agreeable to this plan.  All his questions were answered.   Orders:  -     MRI abdomen w wo contrast and mrcp; Future; Expected date: 05/01/2026  -     BUN; Future; Expected date: 05/01/2026  -     Creatinine, serum; Future; Expected date: 05/01/2026         Chief Complaint   Patient presents with    Office Visit       Return in about 1 year (around 5/21/2026) for Ofice visit short, Imaging - See orders, with Danica.      Oncology History    No history exists.       Staging: Multiple pancreas cystic lesions discovered May 2021  Treatment history: EUS, June 2023  cyst with irregular margins in the body of the pancreas.  This measured 3.6 x 3.2 cm.  There were other smaller cysts  throughout the pancreas.    CEA in the cyst fluid was 6,366 ng/mL   amylase was 6,2002 U/L   glucose in the cyst fluid was less than 4.3 mg/dL.  Cyst fluid had scant clusters of mucinous cells.  There is no evidence of obvious nodularity seen.   Cells were atypical on pathology  Statistically higher risk by Pancragen  Current treatment: Observation given his family history of pancreas cancer  Disease status: BIANCA    History of Present Illness: Patient returns in follow-up.  He is feeling well.  No abdominal pain, nausea or vomiting.  No change in appetite or unintentional weight loss.  MRI from May 2, 2025 reveals stable cysts in the pancreas.  The largest was in the pancreatic body measuring 2.2 cm.  This did appear to communicate with the main pancreatic duct.  There was no ductal dilatation.  There were no high risk or worrisome features.  I personally reviewed the films.    Review of Systems  Complete ROS Surg Onc:   Complete ROS Surg Onc:   Constitutional: The patient denies new or recent history of general fatigue, no recent weight loss, no change in appetite.   Eyes: No complaints of visual problems, no scleral icterus.   ENT: no complaints of ear pain, no hoarseness, no difficulty swallowing,  no tinnitus and no new masses in head, oral cavity, or neck.   Cardiovascular: No complaints of chest pain, no palpitations, no ankle edema.   Respiratory: No complaints of shortness of breath, no cough.   Gastrointestinal: No complaints of jaundice, no bloody stools, no pale stools.   Genitourinary: No complaints of dysuria, no hematuria, no nocturia, no frequent urination, no urethral discharge.   Musculoskeletal: No complaints of weakness, paralysis, joint stiffness or arthralgias.  Integumentary: No complaints of rash, no new lesions.   Neurological: No complaints of convulsions, no seizures, no dizziness.   Hematologic/Lymphatic: No complaints of easy bruising.   Endocrine:  No hot or cold intolerance.  No  polydipsia, polyphagia, or polyuria.  Allergy/immunology:  No environmental allergies.  No food allergies.  Not immunocompromised.  Skin:  No pallor or rash.  No wound.        Problem List[1]  Past Medical History:   Diagnosis Date    CAD (coronary artery disease)     DM (diabetes mellitus) (HCC)     Leukemia (HCC)     Seizure disorder (HCC)     Stroke (HCC)     partial vision left eye     Past Surgical History:   Procedure Laterality Date    BACK SURGERY      CORONARY ARTERY BYPASS GRAFT      HERNIA REPAIR      IR EMBOLIZATION (SPECIFY VESSEL OR SITE)  05/03/2023    IR EMBOLIZATION (SPECIFY VESSEL OR SITE)  05/03/2023     Family History   Problem Relation Age of Onset    Pancreatic cancer Mother     Kidney disease Father      Social History     Socioeconomic History    Marital status: /Civil Union     Spouse name: Not on file    Number of children: Not on file    Years of education: Not on file    Highest education level: Not on file   Occupational History    Not on file   Tobacco Use    Smoking status: Never    Smokeless tobacco: Never   Vaping Use    Vaping status: Never Used   Substance and Sexual Activity    Alcohol use: Not Currently    Drug use: Not Currently    Sexual activity: Not on file   Other Topics Concern    Not on file   Social History Narrative    Not on file     Social Drivers of Health     Financial Resource Strain: Not on file   Food Insecurity: Not on file   Transportation Needs: No Transportation Needs (5/4/2023)    PRAPARE - Transportation     Lack of Transportation (Medical): No     Lack of Transportation (Non-Medical): No   Physical Activity: Not on file   Stress: Not on file   Social Connections: Not on file   Intimate Partner Violence: Not on file   Housing Stability: Not on file     Current Medications[2]  Allergies   Allergen Reactions    Sucralose - Food Allergy Headache    Phenytoin Rash     Red  Jason up arm to head       Vitals:    05/21/25 1028   BP: 132/82   Pulse: (!) 52    Resp: 18   Temp: 98.6 °F (37 °C)   SpO2: 98%       Physical Exam  Constitutional: General appearance: The Patient is well-developed and well-nourished who appears the stated age in no acute distress. Patient is pleasant and talkative.     HEENT:  Normocephalic.  Sclerae are anicteric. Mucous membranes are moist. Neck is supple without adenopathy. No JVD.     Chest: The lungs are clear to auscultation.     Cardiac: Heart is regular rate.     Abdomen: Abdomen is soft, non-tender, non-distended and without masses.     Extremities: There is no clubbing or cyanosis. There is no edema.  Symmetric.  Neuro: Grossly nonfocal. Gait is normal.     Lymphatic: No evidence of cervical adenopathy bilaterally.   No evidence of axillary adenopathy bilaterally.   Skin: Warm, anicteric.    Psych:  Patient is pleasant and talkative.  Breasts:        Pathology:  [unfilled]    Labs:      Imaging  MRI abdomen w wo contrast and mrcp  Result Date: 5/7/2025  Narrative: MRI OF THE ABDOMEN WITH AND WITHOUT CONTRAST WITH MRCP INDICATION: 75 years / Male. K86.2: Cyst of pancreas. COMPARISON: MR abdomen 8/12/2024 TECHNIQUE: Multiplanar/multisequence MRI of the abdomen with 3D MRCP was performed before and after administration of contrast. IV Contrast: 8 mL of Gadobutrol injection (SINGLE-DOSE) FINDINGS: LOWER CHEST: Prior median sternotomy. Small left pleural effusion with subjacent atelectasis, decreased in size from 8/12/2024. Small hiatal hernia. Bilateral gynecomastia. LIVER: Normal in size and configuration. No suspicious mass. Scattered simple hepatic cysts. Patent hepatic and portal veins. BILE DUCTS: No intrahepatic or extrahepatic bile duct dilation. Common bile duct is normal in caliber. No choledocholithiasis, biliary stricture or suspicious mass. GALLBLADDER: Cholelithiasis without findings of acute cholecystitis. PANCREAS: Cystic pancreatic lesion as follows: -Dominant cyst size: Stable appearance of numerous cystic lesions  throughout the pancreas, the largest in the pancreatic body measuring 2.2 cm (series 5 image 24). Unchanged. -Dominant cyst location: Pancreatic body. -Additional cysts: Numerous. -Communication with main pancreatic duct: Dominant cyst communicates with main pancreatic duct. Several of the cysts communicate with the main pancreatic duct. -Main duct dilation: None. -Type: Branch duct intraductal papillary mucinous neoplasm (BD-IPMN.) -High risk stigmata: None. -Worrisome features: None. (Please note the above only takes into account imaging high risk stigmata/worrisome features. Clinical/laboratory features should be assessed separately.) -Management recommendation: Follow-up 6 months once, then every 12 months. Currently, patient has demonstrated stability since 5/9/2023. Recommend next follow-up in 12 months. Endpoint determined by clinician. Preferred imaging modality: abdomen MRI and MRCP with and without IV contrast, or triple phase abdomen CT with IV contrast, or abdomen MRI and MRCP without IV contrast. Management and follow up recommendations for cystic pancreatic lesions are based on Institutional consensus and international evidence-based Kyoto guidelines for the management of intraductal papillary mucinous neoplasm of the pancreas. Pancreatology 24 (2024) 255-270. ADRENAL GLANDS: Unremarkable. SPLEEN: Stable 2.4 cm splenic hemangioma. KIDNEYS/PROXIMAL URETERS: No hydroureteronephrosis. No suspicious renal mass. Simple right renal cysts. Similar chronic left renal atrophy. Slightly decreased size of chronic left perinephric hematoma measuring 1.6 cm in thickness, previously 2.0 cm (series 11 image 83). BOWEL: No dilated loops of bowel. PERITONEUM/RETROPERITONEUM: No ascites. LYMPH NODES: No abdominal lymphadenopathy. VESSELS: No aneurysm. ABDOMINAL WALL: Unremarkable. BONES: No suspicious osseous lesion. Spinal degenerative changes. Susceptibility effect from lumbar fusion hardware.     Impression: 1. No  significant change in numerous cystic lesions throughout the pancreas measuring up to 2.2 cm, several of which communicate with the main pancreatic duct, likely sidebranch IPMNs. -Management recommendation: Follow-up 6 months once, then every 12 months. Currently, patient has demonstrated stability since 5/9/2023. Recommend next follow-up in 12 months. Endpoint determined by clinician. Preferred imaging modality: abdomen MRI and MRCP with and without IV contrast, or triple phase abdomen CT with IV contrast, or abdomen MRI and MRCP without IV contrast. Workstation performed: [id number]     I personally reviewed and interpreted the above laboratory and imaging data.               [1]   Patient Active Problem List  Diagnosis    Kidney capsule rupture, left, initial encounter    Type 2 diabetes mellitus (HCC)    CAD (coronary artery disease)    Seizure disorder (HCC)    Leukemia (HCC)    Splenic hemorrhage    Paroxysmal atrial fibrillation (HCC)    Elevated troponin    Coagulopathy (HCC)    Acute respiratory insufficiency    Acute blood loss anemia    Hyperbilirubinemia    Pancreatic cyst    Family history of pancreatic cancer   [2]   Current Outpatient Medications:     ezetimibe-simvastatin (VYTORIN) 10-40 mg per tablet, Take 1 tablet by mouth daily at bedtime, Disp: , Rfl:     glimepiride (AMARYL) 2 mg tablet, Take 2 mg by mouth every morning before breakfast, Disp: , Rfl:     lacosamide (VIMPAT) 100 mg tablet, Take 100 mg by mouth every 12 (twelve) hours, Disp: , Rfl:     metFORMIN (GLUCOPHAGE-XR) 500 mg 24 hr tablet, Take 500 mg by mouth in the morning and 500 mg in the evening. Take with meals. Per [facility] End[facility] office visit 4/21/23:, Disp: , Rfl:     metoprolol tartrate (LOPRESSOR) 25 mg tablet, Take 25 mg by mouth every 12 (twelve) hours, Disp: , Rfl:     OneTouch Verio test strip, , Disp: , Rfl:     OYSCO 500 + D 500-5 MG-MCG TABS, , Disp: , Rfl:     acetaminophen (TYLENOL) 325 mg tablet, Take 2  tablets (650 mg total) by mouth every 6 (six) hours as needed for mild pain or fever (Patient not taking: Reported on 6/27/2023), Disp: 30 tablet, Rfl: 0    calcium carbonate-vitamin D 500 mg-5 mcg per tablet, Take 1 tablet by mouth 2 (two) times a day with meals. Per [facility] Endocrinology office visit 4/21/23:  Indications: ., Disp: , Rfl:     carbidopa-levodopa (SINEMET)  mg per tablet, Take 1.5 tablets by mouth 3 (three) times a day, Disp: 30 tablet, Rfl: 0    cefuroxime (CEFTIN) 250 mg tablet, , Disp: , Rfl:     clopidogrel (Plavix) 75 mg tablet, Take 1 tablet by mouth daily, Disp: , Rfl:     doxycycline hyclate (VIBRAMYCIN) 100 mg capsule, , Disp: , Rfl:     lisinopril (ZESTRIL) 5 mg tablet, Take 1 tablet by mouth daily, Disp: , Rfl:     Xarelto 20 MG tablet, , Disp: , Rfl: